# Patient Record
Sex: FEMALE | Race: AMERICAN INDIAN OR ALASKA NATIVE | Employment: PART TIME | URBAN - NONMETROPOLITAN AREA
[De-identification: names, ages, dates, MRNs, and addresses within clinical notes are randomized per-mention and may not be internally consistent; named-entity substitution may affect disease eponyms.]

---

## 2017-02-16 ENCOUNTER — TELEPHONE (OUTPATIENT)
Dept: FAMILY MEDICINE CLINIC | Facility: CLINIC | Age: 22
End: 2017-02-16

## 2017-02-16 RX ORDER — ERGOCALCIFEROL 1.25 MG/1
CAPSULE ORAL
Qty: 4 CAPSULE | Refills: 0 | Status: SHIPPED | OUTPATIENT
Start: 2017-02-16 | End: 2017-11-22 | Stop reason: ALTCHOICE

## 2017-02-16 NOTE — TELEPHONE ENCOUNTER
LOV 11/6/15. No F/U scheduled. treadalong message sent 11/2016. LM with patient instructing her to schedule a follow up. Refill pending 1 month.

## 2017-02-20 ENCOUNTER — TELEPHONE (OUTPATIENT)
Dept: FAMILY MEDICINE CLINIC | Facility: CLINIC | Age: 22
End: 2017-02-20

## 2017-03-30 ENCOUNTER — OFFICE VISIT (OUTPATIENT)
Dept: FAMILY MEDICINE CLINIC | Facility: CLINIC | Age: 22
End: 2017-03-30

## 2017-03-30 VITALS
SYSTOLIC BLOOD PRESSURE: 120 MMHG | TEMPERATURE: 98 F | WEIGHT: 231.5 LBS | DIASTOLIC BLOOD PRESSURE: 80 MMHG | BODY MASS INDEX: 42 KG/M2

## 2017-03-30 DIAGNOSIS — E28.2 PCO (POLYCYSTIC OVARIES): ICD-10-CM

## 2017-03-30 DIAGNOSIS — N91.2 AMENORRHEA: ICD-10-CM

## 2017-03-30 DIAGNOSIS — J02.9 PHARYNGITIS, UNSPECIFIED ETIOLOGY: Primary | ICD-10-CM

## 2017-03-30 LAB
CONTROL LINE PRESENT WITH A CLEAR BACKGROUND (YES/NO): YES YES/NO
STREP GRP A CUL-SCR: NEGATIVE

## 2017-03-30 PROCEDURE — 87081 CULTURE SCREEN ONLY: CPT | Performed by: INTERNAL MEDICINE

## 2017-03-30 PROCEDURE — 87880 STREP A ASSAY W/OPTIC: CPT | Performed by: INTERNAL MEDICINE

## 2017-03-30 PROCEDURE — 99214 OFFICE O/P EST MOD 30 MIN: CPT | Performed by: INTERNAL MEDICINE

## 2017-03-30 NOTE — PROGRESS NOTES
Wing Suárez is a 24year old female. HPI:   Pt has been having sore throat and cough. She has not been taking her medications, specifically metformin and lofibra. She has been gaining weight and just not motivated to care for herself.   She has not h Alcohol Use: No                 REVIEW OF SYSTEMS:   GENERAL HEALTH: feels well otherwise  SKIN: denies any unusual skin lesions or rashes  RESPIRATORY: denies shortness of breath with exertion  CARDIOVASCULAR: denies chest pain on exertion  GI: denies abd

## 2017-04-03 RX ORDER — FENOFIBRATE 67 MG/1
CAPSULE ORAL
Qty: 90 CAPSULE | Refills: 0 | Status: SHIPPED | OUTPATIENT
Start: 2017-04-03 | End: 2017-09-18

## 2017-04-07 ENCOUNTER — HOSPITAL ENCOUNTER (OUTPATIENT)
Age: 22
Discharge: HOME | End: 2017-04-07
Payer: COMMERCIAL

## 2017-04-07 ENCOUNTER — APPOINTMENT (OUTPATIENT)
Dept: GENERAL RADIOLOGY | Age: 22
End: 2017-04-07
Attending: PHYSICIAN ASSISTANT
Payer: COMMERCIAL

## 2017-04-07 VITALS
SYSTOLIC BLOOD PRESSURE: 118 MMHG | RESPIRATION RATE: 18 BRPM | OXYGEN SATURATION: 98 % | HEART RATE: 84 BPM | TEMPERATURE: 98 F | DIASTOLIC BLOOD PRESSURE: 82 MMHG
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
## 2017-04-07 DIAGNOSIS — S80.00XA CONTUSION OF KNEE, UNSPECIFIED LATERALITY, INITIAL ENCOUNTER: Primary | ICD-10-CM

## 2017-04-07 PROCEDURE — 99204 OFFICE O/P NEW MOD 45 MIN: CPT

## 2017-04-07 PROCEDURE — 99213 OFFICE O/P EST LOW 20 MIN: CPT

## 2017-04-07 PROCEDURE — 73560 X-RAY EXAM OF KNEE 1 OR 2: CPT

## 2017-04-07 RX ORDER — IBUPROFEN 600 MG/1
600 TABLET ORAL ONCE
Status: DISCONTINUED | OUTPATIENT
Start: 2017-04-07 | End: 2017-04-07

## 2017-04-07 RX ORDER — CYCLOBENZAPRINE HCL 5 MG
5 TABLET ORAL 3 TIMES DAILY PRN
Qty: 10 TABLET | Refills: 0 | Status: SHIPPED | OUTPATIENT
Start: 2017-04-07 | End: 2017-09-06
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
## 2017-04-18 ENCOUNTER — TELEPHONE (OUTPATIENT)
Dept: FAMILY MEDICINE CLINIC | Facility: CLINIC | Age: 22
End: 2017-04-18

## 2017-05-11 ENCOUNTER — LAB ENCOUNTER (OUTPATIENT)
Dept: LAB | Age: 22
End: 2017-05-11
Attending: INTERNAL MEDICINE
Payer: COMMERCIAL

## 2017-05-11 DIAGNOSIS — E28.2 PCO (POLYCYSTIC OVARIES): ICD-10-CM

## 2017-05-11 DIAGNOSIS — E66.9 OBESITY, UNSPECIFIED OBESITY SEVERITY, UNSPECIFIED OBESITY TYPE: ICD-10-CM

## 2017-05-11 DIAGNOSIS — F90.8 ATTENTION DEFICIT HYPERACTIVITY DISORDER (ADHD), OTHER TYPE: ICD-10-CM

## 2017-05-11 DIAGNOSIS — E55.9 VITAMIN D DEFICIENCY: ICD-10-CM

## 2017-05-11 PROCEDURE — 80053 COMPREHEN METABOLIC PANEL: CPT | Performed by: INTERNAL MEDICINE

## 2017-05-11 PROCEDURE — 85025 COMPLETE CBC W/AUTO DIFF WBC: CPT | Performed by: INTERNAL MEDICINE

## 2017-05-11 PROCEDURE — 82306 VITAMIN D 25 HYDROXY: CPT | Performed by: INTERNAL MEDICINE

## 2017-05-11 PROCEDURE — 83036 HEMOGLOBIN GLYCOSYLATED A1C: CPT | Performed by: INTERNAL MEDICINE

## 2017-05-11 PROCEDURE — 36415 COLL VENOUS BLD VENIPUNCTURE: CPT | Performed by: INTERNAL MEDICINE

## 2017-05-11 PROCEDURE — 80061 LIPID PANEL: CPT | Performed by: INTERNAL MEDICINE

## 2017-05-11 RX ORDER — ERGOCALCIFEROL 1.25 MG/1
CAPSULE ORAL
Qty: 4 CAPSULE | Refills: 0 | OUTPATIENT
Start: 2017-05-11

## 2017-05-11 NOTE — PROGRESS NOTES
Stephany Morton is a 24year old female. HPI:   Pt feels as if she has been taking medications more frequently. She has been working more hours at work, 28-30 instead of 8. She is still spending a lot of time in her room and eating unrestrained.        Cu exertion  CARDIOVASCULAR: denies chest pain on exertion  GI: denies abdominal pain and denies heartburn  NEURO: denies headaches    EXAM:   /80 mmHg  Temp(Src) 97.5 °F (36.4 °C) (Temporal)  Ht 62.5\"  Wt 233 lb  BMI 41.91 kg/m2  GENERAL: well develop

## 2017-05-11 NOTE — TELEPHONE ENCOUNTER
LOV 11/6/15. No F/U MyChart letter has been sent and patient has been called multiple times. 1 month refill was sent in 2/2017.

## 2017-05-18 ENCOUNTER — TELEPHONE (OUTPATIENT)
Dept: FAMILY MEDICINE CLINIC | Facility: CLINIC | Age: 22
End: 2017-05-18

## 2017-06-05 RX ORDER — METFORMIN HYDROCHLORIDE 500 MG/1
TABLET, EXTENDED RELEASE ORAL
Qty: 60 TABLET | Refills: 5 | Status: SHIPPED | OUTPATIENT
Start: 2017-06-05 | End: 2017-11-22

## 2017-06-14 ENCOUNTER — TELEPHONE (OUTPATIENT)
Dept: FAMILY MEDICINE CLINIC | Facility: CLINIC | Age: 22
End: 2017-06-14

## 2017-06-14 NOTE — TELEPHONE ENCOUNTER
Patient said that she has been sick for 2-3 days with a cough, laryngitis, congestion, and st. She said she is taking Cipro and Benzonatate left over from January last year that Dr Penelope Oconnell prescribed and she never took the full does of.  She does  Not know ho

## 2017-06-15 ENCOUNTER — OFFICE VISIT (OUTPATIENT)
Dept: FAMILY MEDICINE CLINIC | Facility: CLINIC | Age: 22
End: 2017-06-15

## 2017-06-15 VITALS
SYSTOLIC BLOOD PRESSURE: 128 MMHG | DIASTOLIC BLOOD PRESSURE: 80 MMHG | BODY MASS INDEX: 42 KG/M2 | TEMPERATURE: 99 F | WEIGHT: 231.38 LBS

## 2017-06-15 DIAGNOSIS — B34.9 VIRAL SYNDROME: Primary | ICD-10-CM

## 2017-06-15 PROCEDURE — 99213 OFFICE O/P EST LOW 20 MIN: CPT | Performed by: INTERNAL MEDICINE

## 2017-06-15 RX ORDER — CIPROFLOXACIN 500 MG/1
TABLET, FILM COATED ORAL 2 TIMES DAILY
COMMUNITY
End: 2017-09-06 | Stop reason: ALTCHOICE

## 2017-06-15 RX ORDER — BENZONATATE 200 MG/1
200 CAPSULE ORAL 3 TIMES DAILY PRN
COMMUNITY
End: 2017-09-06

## 2017-06-15 RX ORDER — PREDNISONE 20 MG/1
TABLET ORAL
Qty: 18 TABLET | Refills: 0 | Status: SHIPPED | OUTPATIENT
Start: 2017-06-15 | End: 2017-09-06

## 2017-06-15 NOTE — PROGRESS NOTES
HPI:   Elpidio Willams is a 24year old female who presents for upper respiratory symptoms for  4  days. Patient reports sore throat, congestion, dry cough.       Current Outpatient Prescriptions:  Ciprofloxacin HCl 500 MG Oral Tab Take by mouth 2 (two) time Smoking Status: Never Smoker                      Smokeless Status: Never Used                        Alcohol Use: No                  REVIEW OF SYSTEMS:   GENERAL: feels well otherwise  SKIN: no rashes  EYES:denies blurred vision or double vision

## 2017-06-16 ENCOUNTER — TELEPHONE (OUTPATIENT)
Dept: FAMILY MEDICINE CLINIC | Facility: CLINIC | Age: 22
End: 2017-06-16

## 2017-06-16 NOTE — TELEPHONE ENCOUNTER
Spoke to mom, she was wondering if pt had pneumonia. Advised mom pt was Dx with viral URI. Mom was wondering if Dr Yazmin Mendez had written a work excused for 06/17/017,per records date on work note is only for 06/13/17.    In addition mom states they are going on

## 2017-06-16 NOTE — TELEPHONE ENCOUNTER
No she is to go to work on Saturday. She is on a prednisone taper and had already been taking a CIPRO from a previous illness that she may continue. The prednisone should settle the cough and make her less contagious.

## 2017-06-17 ENCOUNTER — TELEPHONE (OUTPATIENT)
Dept: FAMILY MEDICINE CLINIC | Facility: CLINIC | Age: 22
End: 2017-06-17

## 2017-06-19 ENCOUNTER — HOSPITAL ENCOUNTER (OUTPATIENT)
Dept: GENERAL RADIOLOGY | Age: 22
Discharge: HOME | End: 2017-06-19
Attending: INTERNAL MEDICINE
Payer: COMMERCIAL

## 2017-06-19 ENCOUNTER — OFFICE VISIT (OUTPATIENT)
Dept: FAMILY MEDICINE CLINIC | Facility: CLINIC | Age: 22
End: 2017-06-19

## 2017-06-19 ENCOUNTER — TELEPHONE (OUTPATIENT)
Dept: FAMILY MEDICINE CLINIC | Facility: CLINIC | Age: 22
End: 2017-06-19

## 2017-06-19 VITALS
DIASTOLIC BLOOD PRESSURE: 68 MMHG | HEART RATE: 70 BPM | BODY MASS INDEX: 42 KG/M2 | RESPIRATION RATE: 12 BRPM | WEIGHT: 233.38 LBS | SYSTOLIC BLOOD PRESSURE: 110 MMHG | TEMPERATURE: 99 F

## 2017-06-19 DIAGNOSIS — R05.9 COUGH: Primary | ICD-10-CM

## 2017-06-19 DIAGNOSIS — R05.9 COUGH: ICD-10-CM

## 2017-06-19 PROCEDURE — 71020 XR CHEST PA + LAT CHEST (CPT=71020): CPT | Performed by: INTERNAL MEDICINE

## 2017-06-19 PROCEDURE — 99214 OFFICE O/P EST MOD 30 MIN: CPT | Performed by: INTERNAL MEDICINE

## 2017-06-19 RX ORDER — ALBUTEROL SULFATE 90 UG/1
2 AEROSOL, METERED RESPIRATORY (INHALATION) EVERY 6 HOURS PRN
Qty: 1 INHALER | Refills: 1 | Status: ON HOLD | OUTPATIENT
Start: 2017-06-19 | End: 2018-03-25 | Stop reason: ALTCHOICE

## 2017-06-19 NOTE — TELEPHONE ENCOUNTER
Mom said that patient is taking Pednisone not on any inhalers and she is still sob and coughing. She said she did not sleep last night and she did not go to work today. Mom asked if she should get a nebulizer.

## 2017-06-19 NOTE — TELEPHONE ENCOUNTER
Mom said that she is feeling worse and she thinks she does have a fever but does not have a thermometer. Advised to come in at 1pm today.  ELIJAH Magana RN

## 2017-06-19 NOTE — TELEPHONE ENCOUNTER
Does she have fever? Productive cough? Is she feeling worse, if yes she needs to return for an X ray and OV. I can order an albuterol inhaler to use as well.

## 2017-06-19 NOTE — PROGRESS NOTES
HPI:   Tez Ruiz is a 24year old female who presents for upper respiratory symptoms for  7  days. Patient reports congestion, dry cough, on a steriod and still coughing. Return to recheck and CXR. .      Current Outpatient Prescriptions:  Albuterol S Depression    • Obesity, unspecified    • Fatty liver    • PCOS (polycystic ovarian syndrome)       No past surgical history on file.    Family History   Problem Relation Age of Onset   • Adopted: Yes        Smoking Status: Never Smoker

## 2017-06-20 ENCOUNTER — TELEPHONE (OUTPATIENT)
Dept: FAMILY MEDICINE CLINIC | Facility: CLINIC | Age: 22
End: 2017-06-20

## 2017-06-20 NOTE — TELEPHONE ENCOUNTER
I have it. Your scores indicate depression and you need more aggressive treatment and likely medications and continued therapy.  Maybe it time to see a specialist like a psychiatrist to help with med management, I know this sounds like work  And it is, BUT

## 2017-06-21 ENCOUNTER — TELEPHONE (OUTPATIENT)
Dept: FAMILY MEDICINE CLINIC | Facility: CLINIC | Age: 22
End: 2017-06-21

## 2017-06-21 RX ORDER — DEXTROAMPHETAMINE SACCHARATE, AMPHETAMINE ASPARTATE MONOHYDRATE, DEXTROAMPHETAMINE SULFATE AND AMPHETAMINE SULFATE 5; 5; 5; 5 MG/1; MG/1; MG/1; MG/1
20 CAPSULE, EXTENDED RELEASE ORAL EVERY MORNING
Qty: 30 CAPSULE | Refills: 0 | Status: CANCELLED | OUTPATIENT
Start: 2017-06-21

## 2017-06-21 NOTE — TELEPHONE ENCOUNTER
Patient advised. She said she would like to try the Adderral, she will  script today. She said she has an appointment with a psychiatrist in [place], she could not remember her name.

## 2017-07-13 ENCOUNTER — TELEPHONE (OUTPATIENT)
Dept: FAMILY MEDICINE CLINIC | Facility: CLINIC | Age: 22
End: 2017-07-13

## 2017-07-13 NOTE — TELEPHONE ENCOUNTER
SHE SAW A NURSE PRACTIONER YESTERDAY FOR A PSYC EVAL AND DOES SHE NEED TO COME AND SIGN A RELEASE SO THAT THE NP CAN CALL YOU AND TALK TO YOU ABOUT THE RESULTS

## 2017-07-19 ENCOUNTER — TELEPHONE (OUTPATIENT)
Dept: FAMILY MEDICINE CLINIC | Facility: CLINIC | Age: 22
End: 2017-07-19

## 2017-07-31 ENCOUNTER — MED REC SCAN ONLY (OUTPATIENT)
Dept: FAMILY MEDICINE CLINIC | Facility: CLINIC | Age: 22
End: 2017-07-31

## 2017-08-07 ENCOUNTER — TELEPHONE (OUTPATIENT)
Dept: FAMILY MEDICINE CLINIC | Facility: CLINIC | Age: 22
End: 2017-08-07

## 2017-08-24 ENCOUNTER — TELEPHONE (OUTPATIENT)
Dept: FAMILY MEDICINE CLINIC | Facility: CLINIC | Age: 22
End: 2017-08-24

## 2017-08-24 RX ORDER — ESCITALOPRAM OXALATE 10 MG/1
10 TABLET ORAL DAILY
Qty: 30 TABLET | Refills: 0 | COMMUNITY
Start: 2017-08-24 | End: 2018-03-30

## 2017-08-24 NOTE — TELEPHONE ENCOUNTER
XENIA      SHE WILL PICK IT UP TOMORROW AFTERNOON       ALSO SHE WANTS DR BREWER TO KNOW THAT SHE STARTED TAKING ESCITALOPRAM YESTERDAY

## 2017-09-01 ENCOUNTER — TELEPHONE (OUTPATIENT)
Dept: FAMILY MEDICINE CLINIC | Facility: CLINIC | Age: 22
End: 2017-09-01

## 2017-09-05 NOTE — TELEPHONE ENCOUNTER
LM for Mom that she should check at the end of the week, will take at least 30 days, she should call back at the end of the week.

## 2017-09-06 ENCOUNTER — OFFICE VISIT (OUTPATIENT)
Dept: FAMILY MEDICINE CLINIC | Facility: CLINIC | Age: 22
End: 2017-09-06

## 2017-09-06 VITALS
HEART RATE: 80 BPM | DIASTOLIC BLOOD PRESSURE: 70 MMHG | BODY MASS INDEX: 42.79 KG/M2 | HEIGHT: 62.5 IN | TEMPERATURE: 98 F | WEIGHT: 238.5 LBS | RESPIRATION RATE: 16 BRPM | SYSTOLIC BLOOD PRESSURE: 110 MMHG

## 2017-09-06 DIAGNOSIS — L90.0 LICHEN SCLEROSUS: ICD-10-CM

## 2017-09-06 DIAGNOSIS — K60.2 ANAL FISSURE: ICD-10-CM

## 2017-09-06 DIAGNOSIS — K62.5 RECTAL BLEEDING: Primary | ICD-10-CM

## 2017-09-06 PROCEDURE — 99213 OFFICE O/P EST LOW 20 MIN: CPT | Performed by: INTERNAL MEDICINE

## 2017-09-06 RX ORDER — ESCITALOPRAM OXALATE 10 MG/1
10 TABLET ORAL DAILY
Refills: 0 | COMMUNITY
Start: 2017-09-06 | End: 2017-09-06

## 2017-09-06 RX ORDER — CLOBETASOL PROPIONATE 0.5 MG/G
1 OINTMENT TOPICAL 2 TIMES DAILY
Qty: 60 G | Refills: 0 | Status: ON HOLD | OUTPATIENT
Start: 2017-09-06 | End: 2018-03-25 | Stop reason: ALTCHOICE

## 2017-09-06 RX ORDER — POLYETHYLENE GLYCOL 3350 17 G/17G
17 POWDER, FOR SOLUTION ORAL DAILY
Qty: 500 G | Refills: 0 | Status: ON HOLD | OUTPATIENT
Start: 2017-09-06 | End: 2018-03-25

## 2017-09-18 RX ORDER — FENOFIBRATE 67 MG/1
CAPSULE ORAL
Qty: 90 CAPSULE | Refills: 3 | Status: SHIPPED | OUTPATIENT
Start: 2017-09-18 | End: 2017-11-22

## 2017-09-22 ENCOUNTER — TELEPHONE (OUTPATIENT)
Dept: FAMILY MEDICINE CLINIC | Facility: CLINIC | Age: 22
End: 2017-09-22

## 2017-09-22 NOTE — TELEPHONE ENCOUNTER
Records req. From [facility] they received notice that no records available for that time period. Pt. Has records in [employer]. Called and spoke to [employer] and per [employer] she spoke with a supervisor and they will reprocess this request today.

## 2017-09-27 ENCOUNTER — TELEPHONE (OUTPATIENT)
Dept: FAMILY MEDICINE CLINIC | Facility: CLINIC | Age: 22
End: 2017-09-27

## 2017-09-27 RX ORDER — DEXTROAMPHETAMINE SACCHARATE, AMPHETAMINE ASPARTATE MONOHYDRATE, DEXTROAMPHETAMINE SULFATE AND AMPHETAMINE SULFATE 5; 5; 5; 5 MG/1; MG/1; MG/1; MG/1
20 CAPSULE, EXTENDED RELEASE ORAL DAILY
Qty: 30 CAPSULE | Refills: 0 | Status: SHIPPED | OUTPATIENT
Start: 2017-10-27 | End: 2017-11-26

## 2017-09-27 RX ORDER — DEXTROAMPHETAMINE SACCHARATE, AMPHETAMINE ASPARTATE MONOHYDRATE, DEXTROAMPHETAMINE SULFATE AND AMPHETAMINE SULFATE 5; 5; 5; 5 MG/1; MG/1; MG/1; MG/1
20 CAPSULE, EXTENDED RELEASE ORAL DAILY
Qty: 30 CAPSULE | Refills: 0 | Status: SHIPPED | OUTPATIENT
Start: 2017-11-26 | End: 2017-11-22

## 2017-09-27 RX ORDER — DEXTROAMPHETAMINE SACCHARATE, AMPHETAMINE ASPARTATE MONOHYDRATE, DEXTROAMPHETAMINE SULFATE AND AMPHETAMINE SULFATE 5; 5; 5; 5 MG/1; MG/1; MG/1; MG/1
20 CAPSULE, EXTENDED RELEASE ORAL DAILY
Qty: 30 CAPSULE | Refills: 0 | Status: SHIPPED | OUTPATIENT
Start: 2017-09-27 | End: 2017-10-27

## 2017-09-27 NOTE — TELEPHONE ENCOUNTER
Lisdexamfetamine Dimesylate (VYVANSE) 20 MG    They would like to know if they can get a generic due to cost? Please let mom know and she will .

## 2017-11-22 ENCOUNTER — OFFICE VISIT (OUTPATIENT)
Dept: FAMILY MEDICINE CLINIC | Facility: CLINIC | Age: 22
End: 2017-11-22

## 2017-11-22 ENCOUNTER — APPOINTMENT (OUTPATIENT)
Dept: LAB | Age: 22
End: 2017-11-22
Attending: INTERNAL MEDICINE
Payer: COMMERCIAL

## 2017-11-22 VITALS
SYSTOLIC BLOOD PRESSURE: 118 MMHG | RESPIRATION RATE: 16 BRPM | DIASTOLIC BLOOD PRESSURE: 70 MMHG | HEIGHT: 62 IN | HEART RATE: 76 BPM | WEIGHT: 235 LBS | TEMPERATURE: 98 F | BODY MASS INDEX: 43.24 KG/M2

## 2017-11-22 DIAGNOSIS — E55.9 VITAMIN D DEFICIENCY: ICD-10-CM

## 2017-11-22 DIAGNOSIS — K76.0 FATTY LIVER: ICD-10-CM

## 2017-11-22 DIAGNOSIS — B37.3 YEAST VAGINITIS: ICD-10-CM

## 2017-11-22 DIAGNOSIS — E28.2 PCO (POLYCYSTIC OVARIES): ICD-10-CM

## 2017-11-22 DIAGNOSIS — Z00.00 WELL ADULT EXAM: Primary | ICD-10-CM

## 2017-11-22 PROCEDURE — 88175 CYTOPATH C/V AUTO FLUID REDO: CPT | Performed by: INTERNAL MEDICINE

## 2017-11-22 PROCEDURE — 36415 COLL VENOUS BLD VENIPUNCTURE: CPT | Performed by: INTERNAL MEDICINE

## 2017-11-22 PROCEDURE — 80053 COMPREHEN METABOLIC PANEL: CPT | Performed by: INTERNAL MEDICINE

## 2017-11-22 PROCEDURE — 83036 HEMOGLOBIN GLYCOSYLATED A1C: CPT | Performed by: INTERNAL MEDICINE

## 2017-11-22 PROCEDURE — 99395 PREV VISIT EST AGE 18-39: CPT | Performed by: INTERNAL MEDICINE

## 2017-11-22 PROCEDURE — 82306 VITAMIN D 25 HYDROXY: CPT | Performed by: INTERNAL MEDICINE

## 2017-11-22 RX ORDER — DEXTROAMPHETAMINE SACCHARATE, AMPHETAMINE ASPARTATE MONOHYDRATE, DEXTROAMPHETAMINE SULFATE AND AMPHETAMINE SULFATE 7.5; 7.5; 7.5; 7.5 MG/1; MG/1; MG/1; MG/1
30 CAPSULE, EXTENDED RELEASE ORAL DAILY
Qty: 30 CAPSULE | Refills: 0 | Status: SHIPPED | OUTPATIENT
Start: 2017-12-22 | End: 2018-01-21

## 2017-11-22 RX ORDER — FENOFIBRATE 67 MG/1
CAPSULE ORAL
Qty: 90 CAPSULE | Refills: 3 | Status: SHIPPED | OUTPATIENT
Start: 2017-11-22 | End: 2018-04-25

## 2017-11-22 RX ORDER — DEXTROAMPHETAMINE SACCHARATE, AMPHETAMINE ASPARTATE MONOHYDRATE, DEXTROAMPHETAMINE SULFATE AND AMPHETAMINE SULFATE 7.5; 7.5; 7.5; 7.5 MG/1; MG/1; MG/1; MG/1
30 CAPSULE, EXTENDED RELEASE ORAL DAILY
Qty: 30 CAPSULE | Refills: 0 | Status: SHIPPED | OUTPATIENT
Start: 2017-12-22 | End: 2017-11-22

## 2017-11-22 RX ORDER — METFORMIN HYDROCHLORIDE 500 MG/1
1000 TABLET, EXTENDED RELEASE ORAL
Qty: 180 TABLET | Refills: 3 | Status: SHIPPED | OUTPATIENT
Start: 2017-11-22 | End: 2019-03-21

## 2017-11-22 RX ORDER — DEXTROAMPHETAMINE SACCHARATE, AMPHETAMINE ASPARTATE MONOHYDRATE, DEXTROAMPHETAMINE SULFATE AND AMPHETAMINE SULFATE 7.5; 7.5; 7.5; 7.5 MG/1; MG/1; MG/1; MG/1
30 CAPSULE, EXTENDED RELEASE ORAL DAILY
Qty: 30 CAPSULE | Refills: 0 | Status: SHIPPED | OUTPATIENT
Start: 2018-01-21 | End: 2017-11-22

## 2017-11-22 RX ORDER — FLUCONAZOLE 150 MG/1
150 TABLET ORAL ONCE
Qty: 1 TABLET | Refills: 0 | Status: SHIPPED | OUTPATIENT
Start: 2017-11-22 | End: 2017-11-22

## 2017-11-22 RX ORDER — DEXTROAMPHETAMINE SACCHARATE, AMPHETAMINE ASPARTATE MONOHYDRATE, DEXTROAMPHETAMINE SULFATE AND AMPHETAMINE SULFATE 7.5; 7.5; 7.5; 7.5 MG/1; MG/1; MG/1; MG/1
30 CAPSULE, EXTENDED RELEASE ORAL DAILY
Qty: 30 CAPSULE | Refills: 0 | Status: SHIPPED | OUTPATIENT
Start: 2017-11-22 | End: 2017-12-22

## 2017-11-22 RX ORDER — DEXTROAMPHETAMINE SACCHARATE, AMPHETAMINE ASPARTATE MONOHYDRATE, DEXTROAMPHETAMINE SULFATE AND AMPHETAMINE SULFATE 7.5; 7.5; 7.5; 7.5 MG/1; MG/1; MG/1; MG/1
30 CAPSULE, EXTENDED RELEASE ORAL DAILY
Qty: 30 CAPSULE | Refills: 0 | Status: SHIPPED | OUTPATIENT
Start: 2018-01-21 | End: 2018-02-20

## 2017-11-22 RX ORDER — DEXTROAMPHETAMINE SACCHARATE, AMPHETAMINE ASPARTATE MONOHYDRATE, DEXTROAMPHETAMINE SULFATE AND AMPHETAMINE SULFATE 7.5; 7.5; 7.5; 7.5 MG/1; MG/1; MG/1; MG/1
30 CAPSULE, EXTENDED RELEASE ORAL DAILY
Qty: 30 CAPSULE | Refills: 0 | Status: SHIPPED | OUTPATIENT
Start: 2017-11-22 | End: 2017-11-22

## 2017-11-22 NOTE — PROGRESS NOTES
HPI:   Jaden Chavira is a 25year old female who presents for a complete physical exam. Symptoms: denies discharge, itching, burning or dysuria, periods are irregular.  Patient complains of ineffective ADD medications, she is taking them everyday, she is w Value Date   HDL 39 (L) 05/11/2017   HDL 44 (L) 11/09/2016   HDL 35 (L) 05/13/2015       Lab Results  Component Value Date   LDL 92 05/11/2017   LDL 92 11/09/2016    (H) 05/13/2015       Lab Results  Component Value Date   AST 57 (H) 05/11/2017   AS the lungs every 6 (six) hours as needed for Wheezing.  Disp: 1 Inhaler Rfl: 1      Past Medical History:   Diagnosis Date   • ADD (attention deficit disorder with hyperactivity)    • Depression    • Fatty liver    • Obesity, unspecified    • PCOS (polycysti masses or tenderness, PAP was done   RECTAL:done  MUSCULOSKELETAL: back is not tender,FROM of the back  EXTREMITIES: no cyanosis, clubbing or edema  NEURO: Oriented times three,cranial nerves are intact,motor and sensory are grossly intact    ASSESSMENT AN

## 2017-11-24 ENCOUNTER — TELEPHONE (OUTPATIENT)
Dept: FAMILY MEDICINE CLINIC | Facility: CLINIC | Age: 22
End: 2017-11-24

## 2017-11-24 NOTE — TELEPHONE ENCOUNTER
Patient's mom is calling and is wanting to know when patinet should have mammogram? Mom advised that normally mammograms are started at age 36, but I would confirm with Dr. Stacey Cesar.      Also mom is wanting to know when patient is due to start medication to h

## 2017-11-28 ENCOUNTER — TELEPHONE (OUTPATIENT)
Dept: FAMILY MEDICINE CLINIC | Facility: CLINIC | Age: 22
End: 2017-11-28

## 2017-11-28 RX ORDER — ERGOCALCIFEROL 1.25 MG/1
50000 CAPSULE ORAL
Qty: 24 CAPSULE | Refills: 0 | Status: SHIPPED | OUTPATIENT
Start: 2017-11-30 | End: 2018-01-29

## 2017-11-28 NOTE — TELEPHONE ENCOUNTER
----- Message from Dionicio Villalobos MD sent at 11/25/2017  9:13 AM CST -----  D is low, liver function is BETTER! No overt diabetes, these might need to go to another provider. If not  She needs 50,000 IU VIT D 2 x a week for the next 3 months then recheck.

## 2017-11-28 NOTE — TELEPHONE ENCOUNTER
Mom advised. She said she no longer sees the provider for the POS.  Script sent to [facility] in [place]

## 2018-01-29 ENCOUNTER — TELEPHONE (OUTPATIENT)
Dept: FAMILY MEDICINE CLINIC | Facility: CLINIC | Age: 23
End: 2018-01-29

## 2018-01-29 RX ORDER — ERGOCALCIFEROL 1.25 MG/1
50000 CAPSULE ORAL
Qty: 8 CAPSULE | Refills: 0 | Status: SHIPPED | OUTPATIENT
Start: 2018-01-29 | End: 2018-02-28

## 2018-02-26 RX ORDER — ERGOCALCIFEROL 1.25 MG/1
CAPSULE ORAL
Qty: 24 CAPSULE | Refills: 0 | Status: SHIPPED | OUTPATIENT
Start: 2018-02-26 | End: 2018-04-17

## 2018-02-26 NOTE — TELEPHONE ENCOUNTER
Last office visit: 11/22/2017  Last Vitamin D taken 11/22/2017:  15.9    Last refill: 1/29/2018    Pending Prescriptions Disp Refills    ERGOCALCIFEROL 21048 units Oral Cap [Pharmacy Med Name: VITAMIN D2 50,000IU (ERGO) CAP RX] 24 capsule 0     Sig: TAKE 1

## 2018-03-25 PROBLEM — F41.1 ANXIETY STATE: Status: ACTIVE | Noted: 2018-03-25

## 2018-03-25 PROBLEM — F33.9 MAJOR DEPRESSION, RECURRENT (HCC): Status: ACTIVE | Noted: 2018-03-25

## 2018-03-25 PROBLEM — F33.9 MAJOR DEPRESSION, RECURRENT: Status: ACTIVE | Noted: 2018-03-25

## 2018-03-31 ENCOUNTER — TELEPHONE (OUTPATIENT)
Dept: FAMILY MEDICINE CLINIC | Facility: CLINIC | Age: 23
End: 2018-03-31

## 2018-03-31 RX ORDER — SUMATRIPTAN 100 MG/1
100 TABLET, FILM COATED ORAL EVERY 2 HOUR PRN
Qty: 9 TABLET | Refills: 1 | Status: SHIPPED | OUTPATIENT
Start: 2018-03-31 | End: 2021-06-18

## 2018-03-31 RX ORDER — SUMATRIPTAN 100 MG/1
TABLET, FILM COATED ORAL
Qty: 9 TABLET | Refills: 0 | Status: SHIPPED | OUTPATIENT
Start: 2018-03-31 | End: 2019-04-01

## 2018-03-31 NOTE — TELEPHONE ENCOUNTER
Last rf 6/24/16 #9x1  Last ov 11/22/17    Future Appointments  Date Time Provider Ervin Steiner   4/2/2018 8:30 AM ADULT ANXIETY PHP [facility] [place]   4/3/2018 8:30 AM ADULT ANXIETY PHP [facility]   4/4/2018 8:30 AM ADULT ANXIETY PHP [facility] Nela Bullard

## 2018-04-02 NOTE — TELEPHONE ENCOUNTER
Mom advised.  She said that patient was at [facility] last week and now she is in the outpatient program. Dr Lauren Costa is aware, appointment  Scheduled March 12th at 515pm.

## 2018-04-09 ENCOUNTER — TELEPHONE (OUTPATIENT)
Dept: FAMILY MEDICINE CLINIC | Facility: CLINIC | Age: 23
End: 2018-04-09

## 2018-04-17 ENCOUNTER — OFFICE VISIT (OUTPATIENT)
Dept: FAMILY MEDICINE CLINIC | Facility: CLINIC | Age: 23
End: 2018-04-17

## 2018-04-17 VITALS
WEIGHT: 243.25 LBS | HEIGHT: 62 IN | SYSTOLIC BLOOD PRESSURE: 90 MMHG | DIASTOLIC BLOOD PRESSURE: 70 MMHG | OXYGEN SATURATION: 99 % | BODY MASS INDEX: 44.76 KG/M2 | TEMPERATURE: 97 F | HEART RATE: 72 BPM

## 2018-04-17 DIAGNOSIS — F32.3 SEVERE MAJOR DEPRESSION WITH PSYCHOTIC FEATURES (HCC): ICD-10-CM

## 2018-04-17 DIAGNOSIS — E66.01 CLASS 2 SEVERE OBESITY DUE TO EXCESS CALORIES WITH SERIOUS COMORBIDITY AND BODY MASS INDEX (BMI) OF 36.0 TO 36.9 IN ADULT (HCC): ICD-10-CM

## 2018-04-17 DIAGNOSIS — E28.2 POLYCYSTIC OVARIES: ICD-10-CM

## 2018-04-17 DIAGNOSIS — E55.9 VITAMIN D DEFICIENCY: Primary | ICD-10-CM

## 2018-04-17 PROCEDURE — 99214 OFFICE O/P EST MOD 30 MIN: CPT | Performed by: INTERNAL MEDICINE

## 2018-04-17 RX ORDER — ERGOCALCIFEROL 1.25 MG/1
50000 CAPSULE ORAL
Qty: 24 CAPSULE | Refills: 0 | Status: SHIPPED | OUTPATIENT
Start: 2018-04-19 | End: 2018-04-28
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
## 2018-04-25 ENCOUNTER — TELEPHONE (OUTPATIENT)
Dept: FAMILY MEDICINE CLINIC | Facility: CLINIC | Age: 23
End: 2018-04-25

## 2018-04-25 RX ORDER — FENOFIBRATE 67 MG/1
CAPSULE ORAL
Qty: 90 CAPSULE | Refills: 0 | Status: SHIPPED | OUTPATIENT
Start: 2018-04-25 | End: 2019-08-21

## 2018-04-26 ENCOUNTER — APPOINTMENT (OUTPATIENT)
Dept: LAB | Age: 23
End: 2018-04-26
Attending: INTERNAL MEDICINE
Payer: COMMERCIAL

## 2018-04-26 ENCOUNTER — TELEPHONE (OUTPATIENT)
Dept: FAMILY MEDICINE CLINIC | Facility: CLINIC | Age: 23
End: 2018-04-26

## 2018-04-26 DIAGNOSIS — F32.3 SEVERE MAJOR DEPRESSION WITH PSYCHOTIC FEATURES (HCC): ICD-10-CM

## 2018-04-26 DIAGNOSIS — E55.9 VITAMIN D DEFICIENCY: ICD-10-CM

## 2018-04-26 PROCEDURE — 80061 LIPID PANEL: CPT | Performed by: INTERNAL MEDICINE

## 2018-04-26 PROCEDURE — 36415 COLL VENOUS BLD VENIPUNCTURE: CPT | Performed by: INTERNAL MEDICINE

## 2018-04-26 PROCEDURE — 80053 COMPREHEN METABOLIC PANEL: CPT | Performed by: INTERNAL MEDICINE

## 2018-04-26 PROCEDURE — 82306 VITAMIN D 25 HYDROXY: CPT | Performed by: INTERNAL MEDICINE

## 2018-04-26 NOTE — TELEPHONE ENCOUNTER
MOM LOOKING FOR THE PHYSICAL FORM/INFORMATION FOR HER TO TAKE WITH HER WHEN PATIENT LEAVES Sunday. MOM SAID LIKE A SCHOOL PHYSICAL FORM.

## 2018-04-27 ENCOUNTER — TELEPHONE (OUTPATIENT)
Dept: FAMILY MEDICINE CLINIC | Facility: CLINIC | Age: 23
End: 2018-04-27

## 2018-04-28 RX ORDER — ERGOCALCIFEROL 1.25 MG/1
50000 CAPSULE ORAL
Qty: 24 CAPSULE | Refills: 0 | Status: SHIPPED | OUTPATIENT
Start: 2018-04-30 | End: 2018-07-29

## 2018-04-28 NOTE — TELEPHONE ENCOUNTER
Mom asked if patient should be taking vitamin D supplements still. If so they need a script sent to the pharmacy, she is leaving tomorrow.

## 2018-05-07 ENCOUNTER — MED REC SCAN ONLY (OUTPATIENT)
Dept: FAMILY MEDICINE CLINIC | Facility: CLINIC | Age: 23
End: 2018-05-07

## 2018-08-04 ENCOUNTER — TELEPHONE (OUTPATIENT)
Dept: FAMILY MEDICINE CLINIC | Facility: CLINIC | Age: 23
End: 2018-08-04

## 2018-08-04 NOTE — TELEPHONE ENCOUNTER
Elissa notified of instructions. Would like Dr. Adrian Magdaleno to know that patient is now taking Vitamin D once weekly instead of twice due to the summer. Would like a call if Dr. Adrian Magdaleno has any changes to this.

## 2018-08-04 NOTE — TELEPHONE ENCOUNTER
I reviewed the information provided by the [facility] Tovar 1. I think she should take the progesterone 2 months from 7/19 unless she starts having more spontaneous periods, decreasing the interval will decreased FLOW and cramping.  For cramps use mortin 800 mg 3

## 2018-08-08 ENCOUNTER — MED REC SCAN ONLY (OUTPATIENT)
Dept: FAMILY MEDICINE CLINIC | Facility: CLINIC | Age: 23
End: 2018-08-08

## 2018-10-05 ENCOUNTER — TELEPHONE (OUTPATIENT)
Dept: FAMILY MEDICINE CLINIC | Facility: CLINIC | Age: 23
End: 2018-10-05

## 2018-10-05 NOTE — TELEPHONE ENCOUNTER
VITAMIN D SHE IS OUT OF THIS. SHOULD SHE CONTINUE ON SCRIPT? SHE IS AT SCHOOL OUT OF STATE.   PLEASE CALL MOM  BEFORE SENDING ANYTHING TO A PHARMACY

## 2018-10-05 NOTE — TELEPHONE ENCOUNTER
Last Vitamin D taken 4/26/2018: 45.1        Sarah Lei          10/5/18 3:15 PM   Note        VITAMIN D SHE IS OUT OF THIS. SHOULD SHE CONTINUE ON SCRIPT? SHE IS AT SCHOOL OUT OF STATE.   PLEASE CALL MOM  BEFORE SENDING ANYTHING TO A PHARMACY

## 2018-10-06 RX ORDER — ERGOCALCIFEROL 1.25 MG/1
50000 CAPSULE ORAL
Qty: 24 CAPSULE | Refills: 0 | Status: SHIPPED | OUTPATIENT
Start: 2018-10-08 | End: 2019-01-06

## 2018-10-06 NOTE — TELEPHONE ENCOUNTER
Patient should continue vitamin D as prescribed and check vitamin D level when she is home on break.   Please refill meds as needed

## 2018-10-06 NOTE — TELEPHONE ENCOUNTER
Spoke with mother and advised of recommendation.     Mom is going to call back with name to pharmacy at pt school to send refill

## 2018-10-08 ENCOUNTER — TELEPHONE (OUTPATIENT)
Dept: FAMILY MEDICINE CLINIC | Facility: CLINIC | Age: 23
End: 2018-10-08

## 2018-10-08 DIAGNOSIS — K76.9 CHRONIC NONALCOHOLIC LIVER DISEASE: ICD-10-CM

## 2018-10-08 DIAGNOSIS — E78.2 ELEVATED TRIGLYCERIDES WITH HIGH CHOLESTEROL: ICD-10-CM

## 2018-10-08 DIAGNOSIS — E78.00 ELEVATED CHOLESTEROL: ICD-10-CM

## 2018-10-08 DIAGNOSIS — E55.9 VITAMIN D DEFICIENCY: ICD-10-CM

## 2018-10-08 DIAGNOSIS — R79.89 ABNORMAL LIVER FUNCTION TESTS: Primary | ICD-10-CM

## 2018-10-08 NOTE — TELEPHONE ENCOUNTER
Patient's mother is requesting that the order for labs be mailed or emailed to the health coordinator at her school. It is as follows    Maggie Johnson  [address] [facility], [address]    email is   [email or web address]

## 2018-10-08 NOTE — TELEPHONE ENCOUNTER
Mom said that she does not think that Enedina Nichols will be back before [place], she would like lab orders sent to school. Orders for CMP,Lipid, and Vitamin D emailed.

## 2018-10-23 ENCOUNTER — TELEPHONE (OUTPATIENT)
Dept: FAMILY MEDICINE CLINIC | Facility: CLINIC | Age: 23
End: 2018-10-23

## 2018-10-23 NOTE — TELEPHONE ENCOUNTER
PATIENT IS AWAY AT COLLEGE AND MOTHER CALLING IN ABOUT PROGESTERON FOR OVER A MONTH AND NO CYCLE YET.  ALSO, CAN SHE DO LABS AT SAME TIME AS CPX?  AFTER 11/22

## 2018-10-23 NOTE — TELEPHONE ENCOUNTER
I see she has PCOS. She may not get a period every time and [facility] can continue the every 2 months cycling. The other option is to put [place] on birth control for her PCOS and see if she gets a period that way. I would start with microgestin 20 mcg daily.

## 2018-10-23 NOTE — TELEPHONE ENCOUNTER
Mom said that Dr Roxie Stout put patient on Progesterone on September 19th, and she has not gotten a period. She had been taking it to get her period every 4 months and then Dr Roxie Stout switched it so she would get her period every 2 months.

## 2018-10-25 NOTE — TELEPHONE ENCOUNTER
Per Dr Garfield Barnett, patient may only get a period 3-4 times per year. If she has not gotten a period after 10 days of being on the Progesterone then she can stop it and wait 2 months then take it again. She may also see a gynecologist for further advised.

## 2018-10-25 NOTE — TELEPHONE ENCOUNTER
Mom advised, she said she will have her do that but she will discuss with Dr Erasmo Currie when she gets back.

## 2018-11-17 ENCOUNTER — TELEPHONE (OUTPATIENT)
Dept: FAMILY MEDICINE CLINIC | Facility: CLINIC | Age: 23
End: 2018-11-17

## 2018-11-17 NOTE — TELEPHONE ENCOUNTER
Fax order for lab work to health  [facility] attn: Fela Fish, ph [phone], email anival [email or web address].

## 2018-11-26 ENCOUNTER — TELEPHONE (OUTPATIENT)
Dept: FAMILY MEDICINE CLINIC | Facility: CLINIC | Age: 23
End: 2018-11-26

## 2018-12-12 ENCOUNTER — MED REC SCAN ONLY (OUTPATIENT)
Dept: FAMILY MEDICINE CLINIC | Facility: CLINIC | Age: 23
End: 2018-12-12

## 2018-12-26 ENCOUNTER — OFFICE VISIT (OUTPATIENT)
Dept: FAMILY MEDICINE CLINIC | Facility: CLINIC | Age: 23
End: 2018-12-26
Payer: COMMERCIAL

## 2018-12-26 ENCOUNTER — LAB ENCOUNTER (OUTPATIENT)
Dept: LAB | Age: 23
End: 2018-12-26
Attending: INTERNAL MEDICINE
Payer: COMMERCIAL

## 2018-12-26 VITALS
WEIGHT: 248.5 LBS | OXYGEN SATURATION: 100 % | TEMPERATURE: 98 F | DIASTOLIC BLOOD PRESSURE: 80 MMHG | BODY MASS INDEX: 44.59 KG/M2 | HEIGHT: 62.5 IN | HEART RATE: 78 BPM | SYSTOLIC BLOOD PRESSURE: 118 MMHG

## 2018-12-26 DIAGNOSIS — K76.9 CHRONIC NONALCOHOLIC LIVER DISEASE: ICD-10-CM

## 2018-12-26 DIAGNOSIS — E55.9 VITAMIN D DEFICIENCY: ICD-10-CM

## 2018-12-26 DIAGNOSIS — K76.0 FATTY LIVER: ICD-10-CM

## 2018-12-26 DIAGNOSIS — F33.1 MAJOR DEPRESSIVE DISORDER, RECURRENT EPISODE, MODERATE (HCC): ICD-10-CM

## 2018-12-26 DIAGNOSIS — E78.2 ELEVATED TRIGLYCERIDES WITH HIGH CHOLESTEROL: ICD-10-CM

## 2018-12-26 DIAGNOSIS — Z00.00 WELL ADULT EXAM: Primary | ICD-10-CM

## 2018-12-26 DIAGNOSIS — R79.89 ABNORMAL LIVER FUNCTION TESTS: ICD-10-CM

## 2018-12-26 DIAGNOSIS — E66.01 CLASS 2 SEVERE OBESITY DUE TO EXCESS CALORIES WITH SERIOUS COMORBIDITY AND BODY MASS INDEX (BMI) OF 36.0 TO 36.9 IN ADULT (HCC): ICD-10-CM

## 2018-12-26 PROCEDURE — 80050 GENERAL HEALTH PANEL: CPT | Performed by: INTERNAL MEDICINE

## 2018-12-26 PROCEDURE — 83036 HEMOGLOBIN GLYCOSYLATED A1C: CPT | Performed by: INTERNAL MEDICINE

## 2018-12-26 PROCEDURE — 82306 VITAMIN D 25 HYDROXY: CPT | Performed by: INTERNAL MEDICINE

## 2018-12-26 PROCEDURE — 80061 LIPID PANEL: CPT | Performed by: INTERNAL MEDICINE

## 2018-12-26 PROCEDURE — 36415 COLL VENOUS BLD VENIPUNCTURE: CPT | Performed by: INTERNAL MEDICINE

## 2018-12-26 PROCEDURE — 99395 PREV VISIT EST AGE 18-39: CPT | Performed by: INTERNAL MEDICINE

## 2018-12-26 PROCEDURE — 88175 CYTOPATH C/V AUTO FLUID REDO: CPT | Performed by: INTERNAL MEDICINE

## 2018-12-26 RX ORDER — DROSPIRENONE AND ETHINYL ESTRADIOL 0.03MG-3MG
1 KIT ORAL DAILY
Qty: 3 PACKAGE | Refills: 3 | Status: SHIPPED | OUTPATIENT
Start: 2018-12-26 | End: 2019-03-05

## 2018-12-26 RX ORDER — DEXTROAMPHETAMINE SACCHARATE, AMPHETAMINE ASPARTATE MONOHYDRATE, DEXTROAMPHETAMINE SULFATE AND AMPHETAMINE SULFATE 7.5; 7.5; 7.5; 7.5 MG/1; MG/1; MG/1; MG/1
30 CAPSULE, EXTENDED RELEASE ORAL DAILY
Qty: 30 CAPSULE | Refills: 0 | Status: SHIPPED | OUTPATIENT
Start: 2018-12-26 | End: 2019-01-25

## 2018-12-26 RX ORDER — DEXTROAMPHETAMINE SACCHARATE, AMPHETAMINE ASPARTATE MONOHYDRATE, DEXTROAMPHETAMINE SULFATE AND AMPHETAMINE SULFATE 7.5; 7.5; 7.5; 7.5 MG/1; MG/1; MG/1; MG/1
30 CAPSULE, EXTENDED RELEASE ORAL DAILY
Qty: 30 CAPSULE | Refills: 0 | Status: SHIPPED | OUTPATIENT
Start: 2019-02-24 | End: 2019-04-02

## 2018-12-26 RX ORDER — BUPROPION HYDROCHLORIDE 150 MG/1
150 TABLET ORAL EVERY MORNING
Refills: 0 | COMMUNITY
Start: 2018-12-23 | End: 2019-12-18

## 2018-12-26 RX ORDER — DEXTROAMPHETAMINE SACCHARATE, AMPHETAMINE ASPARTATE MONOHYDRATE, DEXTROAMPHETAMINE SULFATE AND AMPHETAMINE SULFATE 7.5; 7.5; 7.5; 7.5 MG/1; MG/1; MG/1; MG/1
30 CAPSULE, EXTENDED RELEASE ORAL DAILY
Qty: 30 CAPSULE | Refills: 0 | Status: SHIPPED | OUTPATIENT
Start: 2019-01-25 | End: 2019-02-24

## 2018-12-26 RX ORDER — CLOBETASOL PROPIONATE 0.5 MG/G
1 CREAM TOPICAL NIGHTLY
Qty: 60 G | Refills: 0 | Status: SHIPPED | OUTPATIENT
Start: 2018-12-26 | End: 2019-01-25

## 2018-12-26 NOTE — PROGRESS NOTES
HPI:   Tez Ruiz is a 21year old female who presents for a complete physical exam. Symptoms: denies discharge, itching, burning or dysuria, PCOS and weight gain cause irregularity of periods. . Patient complains of nothing.   She needs to restart OCP, 03/25/2018    ALT 60 (H) 11/22/2017         Current Outpatient Medications:  BuPROPion HCl ER, XL, 150 MG Oral Tablet 24 Hr Take 150 mg by mouth every morning.  Disp:  Rfl: 0   ergocalciferol 44422 units Oral Cap Take 1 capsule (50,000 Units total) by mouth any unusual skin lesions  EYES:denies blurred vision or double vision  HEENT: denies nasal congestion, sinus pain or ST  LUNGS: denies shortness of breath with exertion  CARDIOVASCULAR: denies chest pain on exertion  GI: denies abdominal pain,denies heartb kg/m²., recommended low fat diet and aerobic exercise 30 minutes three times weekly. The patient indicates understanding of these issues and agrees to the plan. The patient is asked to return for CPX in 1 year.

## 2019-01-24 RX ORDER — FENOFIBRATE 67 MG/1
CAPSULE ORAL
Qty: 90 CAPSULE | Refills: 0 | Status: SHIPPED | OUTPATIENT
Start: 2019-01-24 | End: 2019-04-01

## 2019-01-28 ENCOUNTER — TELEPHONE (OUTPATIENT)
Dept: FAMILY MEDICINE CLINIC | Facility: CLINIC | Age: 24
End: 2019-01-28

## 2019-01-28 NOTE — TELEPHONE ENCOUNTER
Mom said patient is still in [place]. She said she started her BC pill to regulate her period. She asked if she does not get her period on the days she takes the placebos if she should still take them and then should she restart the pill as scheduled?

## 2019-02-13 ENCOUNTER — TELEPHONE (OUTPATIENT)
Dept: FAMILY MEDICINE CLINIC | Facility: CLINIC | Age: 24
End: 2019-02-13

## 2019-02-13 NOTE — TELEPHONE ENCOUNTER
Mom advised, she said that patient cannot eat and she lies in bed all day Mom said that she is paying $300 a day for her to be where she is so she is ok with her periods being irregular for now.  She said that she is coming home on March 1st because she has

## 2019-03-05 ENCOUNTER — OFFICE VISIT (OUTPATIENT)
Dept: FAMILY MEDICINE CLINIC | Facility: CLINIC | Age: 24
End: 2019-03-05
Payer: MEDICAID

## 2019-03-05 VITALS
WEIGHT: 238 LBS | HEART RATE: 95 BPM | TEMPERATURE: 98 F | BODY MASS INDEX: 43 KG/M2 | SYSTOLIC BLOOD PRESSURE: 120 MMHG | OXYGEN SATURATION: 98 % | DIASTOLIC BLOOD PRESSURE: 70 MMHG

## 2019-03-05 DIAGNOSIS — G47.33 OSA (OBSTRUCTIVE SLEEP APNEA): Primary | ICD-10-CM

## 2019-03-05 PROCEDURE — 99214 OFFICE O/P EST MOD 30 MIN: CPT | Performed by: INTERNAL MEDICINE

## 2019-03-05 RX ORDER — ERGOCALCIFEROL 1.25 MG/1
50000 CAPSULE ORAL WEEKLY
Qty: 24 CAPSULE | Refills: 0 | Status: SHIPPED | OUTPATIENT
Start: 2019-03-05 | End: 2019-06-03

## 2019-03-05 NOTE — PROGRESS NOTES
Stephany Morton is a 21year old female. HPI:   Pt has been having some breakthrough bleeding after stopping the OCP for vomiting. 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   I don't Past Medical History:   Diagnosis Date   • ADD (attention deficit disorder with hyperactivity)    • Depression    • Fatty liver    • Obesity, unspecified    • PCOS (polycystic ovarian syndrome)       Social History:  Social History    Tobacco Use      Sm

## 2019-03-18 ENCOUNTER — TELEPHONE (OUTPATIENT)
Dept: FAMILY MEDICINE CLINIC | Facility: CLINIC | Age: 24
End: 2019-03-18

## 2019-03-18 RX ORDER — ARIPIPRAZOLE 5 MG/1
5 TABLET ORAL DAILY
Qty: 30 TABLET | Refills: 0 | Status: SHIPPED | OUTPATIENT
Start: 2019-03-18 | End: 2019-11-12

## 2019-03-18 NOTE — TELEPHONE ENCOUNTER
Mom asked if an authorization is needed for the sleep study, advised per the referral department no authorization is needed,

## 2019-03-21 ENCOUNTER — TELEPHONE (OUTPATIENT)
Dept: FAMILY MEDICINE CLINIC | Facility: CLINIC | Age: 24
End: 2019-03-21

## 2019-03-21 RX ORDER — METFORMIN HYDROCHLORIDE 500 MG/1
1000 TABLET, EXTENDED RELEASE ORAL
Qty: 180 TABLET | Refills: 0 | Status: SHIPPED | OUTPATIENT
Start: 2019-03-21 | End: 2019-08-22

## 2019-04-01 RX ORDER — ARIPIPRAZOLE 5 MG/1
TABLET ORAL
Qty: 30 TABLET | Refills: 0 | OUTPATIENT
Start: 2019-04-01

## 2019-04-01 RX ORDER — FENOFIBRATE 67 MG/1
CAPSULE ORAL
Qty: 90 CAPSULE | Refills: 0 | Status: SHIPPED | OUTPATIENT
Start: 2019-04-01 | End: 2019-10-29

## 2019-04-01 RX ORDER — SUMATRIPTAN 100 MG/1
TABLET, FILM COATED ORAL
Qty: 9 TABLET | Refills: 0 | Status: SHIPPED | OUTPATIENT
Start: 2019-04-01 | End: 2019-08-21

## 2019-04-01 NOTE — TELEPHONE ENCOUNTER
Amphetamine-Dextroamphet ER (ADDERALL XR) 30 MG Oral Capsule SR 24 Hr  PLEASE REFILL, ANDRE WILL  WHEN READY.   HER MEDS WERE LOST IN THE MOVE

## 2019-04-01 NOTE — TELEPHONE ENCOUNTER
Last OV: 3/5/2019  Aripiprazole: refused: filled 3/18/2019 #30 no RF  Sumatriptan: 3/31/2018 #9 no RF  Fenofibrate: 4/25/2018 #90 no RF

## 2019-04-02 RX ORDER — DEXTROAMPHETAMINE SACCHARATE, AMPHETAMINE ASPARTATE MONOHYDRATE, DEXTROAMPHETAMINE SULFATE AND AMPHETAMINE SULFATE 7.5; 7.5; 7.5; 7.5 MG/1; MG/1; MG/1; MG/1
30 CAPSULE, EXTENDED RELEASE ORAL DAILY
Qty: 30 CAPSULE | Refills: 0 | Status: SHIPPED | OUTPATIENT
Start: 2019-04-02 | End: 2019-05-02

## 2019-04-12 ENCOUNTER — TELEPHONE (OUTPATIENT)
Dept: FAMILY MEDICINE CLINIC | Facility: CLINIC | Age: 24
End: 2019-04-12

## 2019-04-12 NOTE — TELEPHONE ENCOUNTER
Records request received from [facility] for labs from 4/18-present. Labs faxed to [phone] and confirmation page received.

## 2019-04-18 ENCOUNTER — OFFICE VISIT (OUTPATIENT)
Dept: FAMILY MEDICINE CLINIC | Facility: CLINIC | Age: 24
End: 2019-04-18
Payer: MEDICAID

## 2019-04-18 VITALS
RESPIRATION RATE: 16 BRPM | OXYGEN SATURATION: 98 % | SYSTOLIC BLOOD PRESSURE: 112 MMHG | HEART RATE: 80 BPM | TEMPERATURE: 98 F | DIASTOLIC BLOOD PRESSURE: 72 MMHG | BODY MASS INDEX: 43.46 KG/M2 | WEIGHT: 242.19 LBS | HEIGHT: 62.5 IN

## 2019-04-18 DIAGNOSIS — G47.10 HYPERSOMNIA: ICD-10-CM

## 2019-04-18 DIAGNOSIS — F90.8 ATTENTION DEFICIT HYPERACTIVITY DISORDER (ADHD), OTHER TYPE: ICD-10-CM

## 2019-04-18 DIAGNOSIS — G47.8 SLEEP AROUSAL DISORDER: ICD-10-CM

## 2019-04-18 DIAGNOSIS — G47.19 EXCESSIVE DAYTIME SLEEPINESS: Primary | ICD-10-CM

## 2019-04-18 PROCEDURE — 99214 OFFICE O/P EST MOD 30 MIN: CPT | Performed by: FAMILY MEDICINE

## 2019-04-18 NOTE — PATIENT INSTRUCTIONS
You have been scheduled for a PSG  titration at [facility]. Please  Call the sleep center at  to review forms and receive forms to fill out in the mail. The sleep center will pre-certify your study.  If they have any diff nighttime sleep. 5. Exercise regularly. Regular exercise can improve sleep quality and duration. However, exercising immediately before bedtime can have a stimulant effect on the body and should be avoided.  Try to finish exercising at least three hours bef progressive muscle relaxation (perhaps with audio tapes), deep breathing techniques, imagery, and meditation.   11.   '

## 2019-04-18 NOTE — PROGRESS NOTES
[facility]  SLEEP PROGRESS NOTE        HPI:   This is a 21year old female coming in for Patient presents with:  Consult: Sleep consult      HPI:  Pt is here at the request of:   She notes that she has a very hard time falling asleep. >16 inches (40 cm)?  1   Gender Male? 0   Stop Bang Score 3   Obstructive Sleep Apnea Risk High Risk of GURWINDER       No results found for: IRON, IRONTOT, TIBC, IRONSAT, TRANSFERRIN, TIBCP, IRONBIND, SAT, SATUR  No results found for: PHUONG  Lab Results   Componen Rfl: 0   SUMAtriptan Succinate (IMITREX) 100 MG Oral Tab Take 1 tablet (100 mg total) by mouth every 2 (two) hours as needed for Migraine. Disp: 9 tablet Rfl: 1   escitalopram 20 MG Oral Tab Take 1 tablet (20 mg total) by mouth daily.  (Patient taking diffe lb  09/06/17 : 238 lb 8 oz    BP Readings from Last 3 Encounters:  04/18/19 : 112/72  03/05/19 : 120/70  12/26/18 : 118/80    Ideal body weight: 112 lb 15.8 oz  Adjusted ideal body weight: 164 lb 10.7 oz    Vital signs reviewed.   Physical Exam   [facility] study. If they have any difficulties they will contact you. My nurse will call you when your study is read and orders have been sent to [facility]. Call [facility] [phone] to  machine after called by our nurses.       Follow up with me 2 jerad least three hours before you plan to retire for the night. 6. Limit activities in bed. The bed is for sleeping and having sex and that's it. Do not use the computer or smart phone, watch TV, catch up on work or listen to music while in bed.  All these activ abnormal heart rhythm  and death,  increased risk of driving accidents. Advised to refrain from driving when sleepy. COMPLIANCE is required by insurance for 4 hours a night most nights of the week.   Recommend weight loss, and maintain and optimal BMI

## 2019-04-24 ENCOUNTER — TELEPHONE (OUTPATIENT)
Dept: FAMILY MEDICINE CLINIC | Facility: CLINIC | Age: 24
End: 2019-04-24

## 2019-04-24 DIAGNOSIS — R06.83 SNORING: ICD-10-CM

## 2019-04-24 DIAGNOSIS — G47.8 SLEEP AROUSAL DISORDER: Primary | ICD-10-CM

## 2019-04-24 DIAGNOSIS — G47.10 HYPERSOMNIA: ICD-10-CM

## 2019-04-25 NOTE — TELEPHONE ENCOUNTER
Recommend in lab PSG, however the insurance company Is demanding a inferior study, a home sleep study, which may in the long run incur more health care costs. Sleep arousal disorder cannot be determined through a Home sleep study.    Ok to order a Home slee

## 2019-05-01 ENCOUNTER — OFFICE VISIT (OUTPATIENT)
Dept: FAMILY MEDICINE CLINIC | Facility: CLINIC | Age: 24
End: 2019-05-01
Payer: MEDICAID

## 2019-05-01 VITALS
DIASTOLIC BLOOD PRESSURE: 88 MMHG | TEMPERATURE: 98 F | SYSTOLIC BLOOD PRESSURE: 116 MMHG | WEIGHT: 244 LBS | BODY MASS INDEX: 44 KG/M2

## 2019-05-01 DIAGNOSIS — E28.2 PCO (POLYCYSTIC OVARIES): ICD-10-CM

## 2019-05-01 DIAGNOSIS — G47.10 HYPERSOMNIA: ICD-10-CM

## 2019-05-01 DIAGNOSIS — G47.8 SLEEP AROUSAL DISORDER: Primary | ICD-10-CM

## 2019-05-01 PROCEDURE — 99214 OFFICE O/P EST MOD 30 MIN: CPT | Performed by: INTERNAL MEDICINE

## 2019-05-01 RX ORDER — DEXTROAMPHETAMINE SACCHARATE, AMPHETAMINE ASPARTATE MONOHYDRATE, DEXTROAMPHETAMINE SULFATE AND AMPHETAMINE SULFATE 7.5; 7.5; 7.5; 7.5 MG/1; MG/1; MG/1; MG/1
30 CAPSULE, EXTENDED RELEASE ORAL DAILY
Qty: 30 CAPSULE | Refills: 0 | Status: SHIPPED | OUTPATIENT
Start: 2019-05-31 | End: 2019-06-30

## 2019-05-01 RX ORDER — DEXTROAMPHETAMINE SACCHARATE, AMPHETAMINE ASPARTATE MONOHYDRATE, DEXTROAMPHETAMINE SULFATE AND AMPHETAMINE SULFATE 7.5; 7.5; 7.5; 7.5 MG/1; MG/1; MG/1; MG/1
30 CAPSULE, EXTENDED RELEASE ORAL DAILY
Qty: 30 CAPSULE | Refills: 0 | Status: SHIPPED | OUTPATIENT
Start: 2019-06-30 | End: 2019-07-30

## 2019-05-01 RX ORDER — DEXTROAMPHETAMINE SACCHARATE, AMPHETAMINE ASPARTATE MONOHYDRATE, DEXTROAMPHETAMINE SULFATE AND AMPHETAMINE SULFATE 7.5; 7.5; 7.5; 7.5 MG/1; MG/1; MG/1; MG/1
30 CAPSULE, EXTENDED RELEASE ORAL DAILY
Qty: 30 CAPSULE | Refills: 0 | Status: SHIPPED | OUTPATIENT
Start: 2019-05-01 | End: 2019-05-31

## 2019-05-01 NOTE — PROGRESS NOTES
Lula Vides is a 21year old female. HPI:   Pt has been doing ok. Tried all the time. Has a sleep study May 9th. She has a job at [employer] now and working night shift. She has been taking medications. Mother is here with her today.   She has not had a Take 1 tablet (100 mg total) by mouth every 2 (two) hours as needed for Migraine. Disp: 9 tablet Rfl: 1   escitalopram 20 MG Oral Tab Take 1 tablet (20 mg total) by mouth daily.  (Patient taking differently: Take 30 mg by mouth daily.  ) Disp: 30 tablet Rfl provera to follow. No orders of the defined types were placed in this encounter.       Meds & Refills for this Visit:  Requested Prescriptions     Signed Prescriptions Disp Refills   • Progesterone Micronized 200 MG Oral Cap 14 capsule 0     Sig: Take 1 c

## 2019-05-09 NOTE — TELEPHONE ENCOUNTER
calling to confirm sleep study for tonight  ( has not heard anything back after multiple calls to [facility] office )    please Call back ASAP

## 2019-05-09 NOTE — TELEPHONE ENCOUNTER
Patient just called, she found out that her insurance denied her sleep study tonight at [facility]. She would like to know what's next?

## 2019-05-09 NOTE — TELEPHONE ENCOUNTER
Note in chart from yesterday. Call to cancel study and then need Peer to Peer review   Please start peer to peer review.    [phone]

## 2019-05-13 NOTE — TELEPHONE ENCOUNTER
Per Dr. Scott Pereira, [facility] to start with home sleep study vs trying for prior auth. LM for Elissa to return call.

## 2019-05-13 NOTE — TELEPHONE ENCOUNTER
Spoke with Royce Guerra with [employer] [facility]. States patient's case # is [id number]. Per Royce Guerra, repeat sleep study was denied stating it is not needed based on information provided.   Royce Guerra states an appeal can be written by the provider and faxed to

## 2019-05-13 NOTE — TELEPHONE ENCOUNTER
LM for patient's mother Linda Chacon to return call. Will need authorization form completed in order to speak with insurance. Form in [facility] \"Pending Documents Folder. \"  If patient willing to sign, will need Dr. Isidoro Abdi to write letter of appeal also.

## 2019-05-15 ENCOUNTER — TELEPHONE (OUTPATIENT)
Dept: FAMILY MEDICINE CLINIC | Facility: CLINIC | Age: 24
End: 2019-05-15

## 2019-05-15 NOTE — TELEPHONE ENCOUNTER
Patient's mother Michela Grayson informed of the below. States they live only 5 mins away from the [facility] sleep center so she would like to  the home sleep study machine there.   Phone number to the [facility] sleep lab given to mother to call and schedule

## 2019-05-15 NOTE — TELEPHONE ENCOUNTER
Letter received that patient was provided a 90 days supply but needs prior authorization after these 90 days. Prior authorization initiated with Blanca Gutierrez. At [employer] he will have the form faxed to us.

## 2019-05-17 ENCOUNTER — TELEPHONE (OUTPATIENT)
Dept: FAMILY MEDICINE CLINIC | Facility: CLINIC | Age: 24
End: 2019-05-17

## 2019-05-17 NOTE — TELEPHONE ENCOUNTER
Advised patient what insurance is asking for. She is going to request psychiatrist's office forward the pertinent notes.

## 2019-05-17 NOTE — TELEPHONE ENCOUNTER
Message left for Tay Laureano to call back. We need to obtian records from her psychiatrist. Luz Elena Huntley representative from [facility] therapeutics also.   ELIJAH Garcia

## 2019-05-17 NOTE — TELEPHONE ENCOUNTER
NEED OFFICE NOTES THAT PERTAIN TO PRIOR AUTH FORM THAT WAS FILLED OUT FOR [facility], FAX TO # [phone], REF # [id number] IF QUESTIONS, WILL ALSO FAX OVER CORRECT FORM TO OUR OFFICE

## 2019-05-22 ENCOUNTER — TELEPHONE (OUTPATIENT)
Dept: FAMILY MEDICINE CLINIC | Facility: CLINIC | Age: 24
End: 2019-05-22

## 2019-05-22 NOTE — TELEPHONE ENCOUNTER
NA, attempted to call Mother to get information on patient's psychiatrist. We need to get the name and number of her psychiatrist to send information to them regarding prior authorization on Adderall.  [employer] is requesting that the psychiatrist

## 2019-05-23 NOTE — TELEPHONE ENCOUNTER
Patient states that she did tell her phsychiatrist's office [facility] that she needs a prior authorization for her Adderall. Fax from [employer] faxed to [employer] [phone].  Patient's Mother states [place]

## 2019-05-29 ENCOUNTER — OFFICE VISIT (OUTPATIENT)
Dept: SLEEP CENTER | Age: 24
End: 2019-05-29
Attending: FAMILY MEDICINE
Payer: MEDICAID

## 2019-05-29 DIAGNOSIS — G47.10 HYPERSOMNIA: ICD-10-CM

## 2019-05-29 DIAGNOSIS — G47.8 SLEEP AROUSAL DISORDER: ICD-10-CM

## 2019-05-29 DIAGNOSIS — G47.33 OSA (OBSTRUCTIVE SLEEP APNEA): ICD-10-CM

## 2019-05-29 PROCEDURE — 95806 SLEEP STUDY UNATT&RESP EFFT: CPT

## 2019-06-03 ENCOUNTER — TELEPHONE (OUTPATIENT)
Dept: FAMILY MEDICINE CLINIC | Facility: CLINIC | Age: 24
End: 2019-06-03

## 2019-06-03 NOTE — TELEPHONE ENCOUNTER
Spoke with pt's mother in regards to sleep study results. Awaiting results but should be available in the next 1-2 weeks. appt scheduled for pt r/u in [place] for results/recommendations.   Future Appointments   Date Time Provider Ervin Steiner   6/5/

## 2019-06-03 NOTE — TELEPHONE ENCOUNTER
Mother states patient had a sleep study done and they are consfused on what the next steps are.  Requested a call back

## 2019-06-05 ENCOUNTER — APPOINTMENT (OUTPATIENT)
Dept: LAB | Age: 24
End: 2019-06-05
Attending: INTERNAL MEDICINE
Payer: MEDICAID

## 2019-06-05 ENCOUNTER — OFFICE VISIT (OUTPATIENT)
Dept: FAMILY MEDICINE CLINIC | Facility: CLINIC | Age: 24
End: 2019-06-05
Payer: MEDICAID

## 2019-06-05 VITALS
SYSTOLIC BLOOD PRESSURE: 110 MMHG | DIASTOLIC BLOOD PRESSURE: 70 MMHG | BODY MASS INDEX: 44.86 KG/M2 | HEART RATE: 81 BPM | HEIGHT: 62.5 IN | OXYGEN SATURATION: 99 % | WEIGHT: 250 LBS | TEMPERATURE: 98 F

## 2019-06-05 DIAGNOSIS — E28.2 PCO (POLYCYSTIC OVARIES): Primary | ICD-10-CM

## 2019-06-05 DIAGNOSIS — IMO0001 CONTRACEPTION: ICD-10-CM

## 2019-06-05 DIAGNOSIS — E28.2 PCO (POLYCYSTIC OVARIES): ICD-10-CM

## 2019-06-05 LAB
ALBUMIN SERPL-MCNC: 3.4 G/DL (ref 3.4–5)
ALBUMIN/GLOB SERPL: 0.8 {RATIO} (ref 1–2)
ALP LIVER SERPL-CCNC: 80 U/L (ref 52–144)
ALT SERPL-CCNC: 151 U/L (ref 13–56)
ANION GAP SERPL CALC-SCNC: 4 MMOL/L (ref 0–18)
AST SERPL-CCNC: 101 U/L (ref 15–37)
BILIRUB SERPL-MCNC: 0.4 MG/DL (ref 0.1–2)
BUN BLD-MCNC: 6 MG/DL (ref 7–18)
BUN/CREAT SERPL: 9.2 (ref 10–20)
CALCIUM BLD-MCNC: 9.5 MG/DL (ref 8.5–10.1)
CHLORIDE SERPL-SCNC: 109 MMOL/L (ref 98–112)
CHOLEST SMN-MCNC: 176 MG/DL (ref ?–200)
CO2 SERPL-SCNC: 27 MMOL/L (ref 21–32)
CREAT BLD-MCNC: 0.65 MG/DL (ref 0.55–1.02)
EST. AVERAGE GLUCOSE BLD GHB EST-MCNC: 126 MG/DL (ref 68–126)
GLOBULIN PLAS-MCNC: 4.2 G/DL (ref 2.8–4.4)
GLUCOSE BLD-MCNC: 99 MG/DL (ref 70–99)
HBA1C MFR BLD HPLC: 6 % (ref ?–5.7)
HDLC SERPL-MCNC: 39 MG/DL (ref 40–59)
LDLC SERPL CALC-MCNC: 100 MG/DL (ref ?–100)
M PROTEIN MFR SERPL ELPH: 7.6 G/DL (ref 6.4–8.2)
NONHDLC SERPL-MCNC: 137 MG/DL (ref ?–130)
OSMOLALITY SERPL CALC.SUM OF ELEC: 288 MOSM/KG (ref 275–295)
POTASSIUM SERPL-SCNC: 4.1 MMOL/L (ref 3.5–5.1)
SODIUM SERPL-SCNC: 140 MMOL/L (ref 136–145)
TRIGL SERPL-MCNC: 185 MG/DL (ref 30–149)
VLDLC SERPL CALC-MCNC: 37 MG/DL (ref 0–30)

## 2019-06-05 PROCEDURE — 96372 THER/PROPH/DIAG INJ SC/IM: CPT | Performed by: INTERNAL MEDICINE

## 2019-06-05 PROCEDURE — 80061 LIPID PANEL: CPT

## 2019-06-05 PROCEDURE — 80053 COMPREHEN METABOLIC PANEL: CPT

## 2019-06-05 PROCEDURE — 99214 OFFICE O/P EST MOD 30 MIN: CPT | Performed by: INTERNAL MEDICINE

## 2019-06-05 PROCEDURE — 36415 COLL VENOUS BLD VENIPUNCTURE: CPT

## 2019-06-05 PROCEDURE — 83036 HEMOGLOBIN GLYCOSYLATED A1C: CPT

## 2019-06-05 PROCEDURE — 81025 URINE PREGNANCY TEST: CPT | Performed by: INTERNAL MEDICINE

## 2019-06-05 RX ORDER — MEDROXYPROGESTERONE ACETATE 150 MG/ML
150 INJECTION, SUSPENSION INTRAMUSCULAR ONCE
Status: DISCONTINUED | OUTPATIENT
Start: 2019-06-05 | End: 2019-06-05

## 2019-06-05 RX ORDER — MEDROXYPROGESTERONE ACETATE 150 MG/ML
150 INJECTION, SUSPENSION INTRAMUSCULAR ONCE
Status: COMPLETED | OUTPATIENT
Start: 2019-06-05 | End: 2019-06-05

## 2019-06-05 RX ORDER — ERGOCALCIFEROL (VITAMIN D2) 1250 MCG
50000 CAPSULE ORAL WEEKLY
COMMUNITY
End: 2020-05-06

## 2019-06-05 RX ADMIN — MEDROXYPROGESTERONE ACETATE 150 MG: 150 INJECTION, SUSPENSION INTRAMUSCULAR at 11:21:00

## 2019-06-05 NOTE — PROGRESS NOTES
Claudia Pantoja is a 21year old female. HPI:   Pt has been well, no period and still gaining weight. 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   She has been in psychiatric care and they are taking over the total) by mouth daily. (Patient taking differently: Take 30 mg by mouth daily.  ) Disp: 30 tablet Rfl: 0   Amphetamine-Dextroamphet ER 30 MG Oral Capsule SR 24 Hr Take 1 capsule (30 mg total) by mouth every morning.  Disp: 30 capsule Rfl: 0      Past [facility] No prescriptions requested or ordered in this encounter       Imaging & Consults:  None    Follow up as needed. The patient indicates understanding of these issues and agrees to the plan.

## 2019-06-06 ENCOUNTER — SLEEP STUDY (OUTPATIENT)
Dept: FAMILY MEDICINE CLINIC | Facility: CLINIC | Age: 24
End: 2019-06-06
Payer: MEDICAID

## 2019-06-06 DIAGNOSIS — R73.09 ELEVATED HEMOGLOBIN A1C: Primary | ICD-10-CM

## 2019-06-06 DIAGNOSIS — G47.33 OBSTRUCTIVE SLEEP APNEA: Primary | ICD-10-CM

## 2019-06-06 PROCEDURE — 95806 SLEEP STUDY UNATT&RESP EFFT: CPT | Performed by: FAMILY MEDICINE

## 2019-06-10 ENCOUNTER — TELEPHONE (OUTPATIENT)
Dept: FAMILY MEDICINE CLINIC | Facility: CLINIC | Age: 24
End: 2019-06-10

## 2019-06-10 DIAGNOSIS — G47.33 OSA (OBSTRUCTIVE SLEEP APNEA): Primary | ICD-10-CM

## 2019-06-10 PROBLEM — Z99.89 OSA ON CPAP: Status: ACTIVE | Noted: 2019-06-10

## 2019-06-10 NOTE — TELEPHONE ENCOUNTER
Please call patient and mother. Discuss auto pap vs. cpap titration. Needs appointment in  Follow up scheduled as follows. Suggest trial of auto cpap until appointment through [address] [place] [address], [place].      Your Appointments    Thursday July 25, 2019  8:50 AM PK Correa

## 2019-06-10 NOTE — PROCEDURES
[address] [address],[facility]       Accredited by the [facility] (AASM)    PATIENT'S NAME:        vanessa Agent  ATTENDING PHYSICIAN:   Millie Livingston MD  REFERRING PHYSICIAN:   Darryn Leavitt.  Jaye Geren MD  PATIENT apnea.   7.   For periodic limb movement disorder, recommend evaluation of vitamin D and B12, ferritin, and evaluation for diabetes, renal disease, and thyroid disorders. Dictated By Kirby Palmer MD  d: 06/10/2019 07:51:19  t: 06/10/2019 09:15:39  J

## 2019-06-14 NOTE — TELEPHONE ENCOUNTER
Spoke with mother and advised CPAP orders were being sent to [facility]. Updated flowsheet.     Please place order for CPAP

## 2019-06-25 ENCOUNTER — MED REC SCAN ONLY (OUTPATIENT)
Dept: FAMILY MEDICINE CLINIC | Facility: CLINIC | Age: 24
End: 2019-06-25

## 2019-06-28 ENCOUNTER — TELEPHONE (OUTPATIENT)
Dept: FAMILY MEDICINE CLINIC | Facility: CLINIC | Age: 24
End: 2019-06-28

## 2019-06-28 NOTE — TELEPHONE ENCOUNTER
MOM HAS QUESTIONS ABOUT SHOT PT RECEIVED ONE MONTH AGO FOR HER POLYCYSTIC OVARIAN SYNDROME. PT HAS NOT GOTTEN HER PERIOD YET. IS THAT A CONCERN?  PLEASE CALL

## 2019-06-28 NOTE — TELEPHONE ENCOUNTER
She received a Depo shot about a month ago on 6/5/19. NOTHING has happened and mom is confused. She was expecting that she would have started her period by now? She doesn't understand what to expect? Are they on the right tract?    She has not had any s

## 2019-06-29 NOTE — TELEPHONE ENCOUNTER
Discussed with mother.  Needs to continue Depo and if no period in 3 injections consider transvaginal U/S.

## 2019-06-29 NOTE — TELEPHONE ENCOUNTER
Will be unpredictable, this stops ovulation so it is saving her eggs and if no period it is NOT contributing to risk for endometrial cancer.

## 2019-07-22 ENCOUNTER — TELEPHONE (OUTPATIENT)
Dept: FAMILY MEDICINE CLINIC | Facility: CLINIC | Age: 24
End: 2019-07-22

## 2019-07-22 NOTE — TELEPHONE ENCOUNTER
Pt's mother requesting copy of sleep study completed. Pt's mother informed that she could obtain a copy directly from the [facility] [employer] office at her convenience.  Pt's mother verbalized understanding and stated that they do have f/u appt schedule w/ Dr. Jaye Green

## 2019-07-22 NOTE — TELEPHONE ENCOUNTER
Patients mom would like to know if Dr Marleny Win can fax patients sleep study that was done May to the [facility]. Pt has appointment with Dr Marleny Win 9/12 and mom would like a copy of the sleep study before patients appointment.  Mom would like a call when i

## 2019-08-19 ENCOUNTER — TELEPHONE (OUTPATIENT)
Dept: FAMILY MEDICINE CLINIC | Facility: CLINIC | Age: 24
End: 2019-08-19

## 2019-08-19 NOTE — TELEPHONE ENCOUNTER
Mom said that she just found out that [place] has had her period since the beginning of July and there are large amounts of blood clots and she has a lot of pain. She got the Depo Provera shot on June 5th and is due to have it again in September.

## 2019-08-21 ENCOUNTER — TELEPHONE (OUTPATIENT)
Dept: FAMILY MEDICINE CLINIC | Facility: CLINIC | Age: 24
End: 2019-08-21

## 2019-08-21 ENCOUNTER — LAB ENCOUNTER (OUTPATIENT)
Dept: LAB | Age: 24
End: 2019-08-21
Attending: INTERNAL MEDICINE
Payer: MEDICAID

## 2019-08-21 ENCOUNTER — OFFICE VISIT (OUTPATIENT)
Dept: FAMILY MEDICINE CLINIC | Facility: CLINIC | Age: 24
End: 2019-08-21
Payer: MEDICAID

## 2019-08-21 VITALS
WEIGHT: 261 LBS | TEMPERATURE: 98 F | HEART RATE: 72 BPM | BODY MASS INDEX: 47 KG/M2 | SYSTOLIC BLOOD PRESSURE: 120 MMHG | RESPIRATION RATE: 16 BRPM | DIASTOLIC BLOOD PRESSURE: 70 MMHG

## 2019-08-21 DIAGNOSIS — R79.89 ABNORMAL LIVER FUNCTION TESTS: ICD-10-CM

## 2019-08-21 DIAGNOSIS — E66.01 CLASS 2 SEVERE OBESITY DUE TO EXCESS CALORIES WITH SERIOUS COMORBIDITY AND BODY MASS INDEX (BMI) OF 36.0 TO 36.9 IN ADULT (HCC): ICD-10-CM

## 2019-08-21 DIAGNOSIS — E28.2 PCO (POLYCYSTIC OVARIES): ICD-10-CM

## 2019-08-21 DIAGNOSIS — N92.1 MENORRHAGIA WITH IRREGULAR CYCLE: ICD-10-CM

## 2019-08-21 DIAGNOSIS — R73.09 ELEVATED HEMOGLOBIN A1C: ICD-10-CM

## 2019-08-21 DIAGNOSIS — E28.2 PCO (POLYCYSTIC OVARIES): Primary | ICD-10-CM

## 2019-08-21 LAB
ALBUMIN SERPL-MCNC: 3.4 G/DL (ref 3.4–5)
ALBUMIN/GLOB SERPL: 0.8 {RATIO} (ref 1–2)
ALP LIVER SERPL-CCNC: 92 U/L (ref 52–144)
ALT SERPL-CCNC: 89 U/L (ref 13–56)
ANION GAP SERPL CALC-SCNC: 8 MMOL/L (ref 0–18)
AST SERPL-CCNC: 49 U/L (ref 15–37)
BASOPHILS # BLD AUTO: 0.05 X10(3) UL (ref 0–0.2)
BASOPHILS NFR BLD AUTO: 0.4 %
BILIRUB SERPL-MCNC: 0.3 MG/DL (ref 0.1–2)
BUN BLD-MCNC: 12 MG/DL (ref 7–18)
BUN/CREAT SERPL: 15.8 (ref 10–20)
CALCIUM BLD-MCNC: 8.2 MG/DL (ref 8.5–10.1)
CHLORIDE SERPL-SCNC: 108 MMOL/L (ref 98–112)
CO2 SERPL-SCNC: 23 MMOL/L (ref 21–32)
CREAT BLD-MCNC: 0.76 MG/DL (ref 0.55–1.02)
DEPRECATED RDW RBC AUTO: 37 FL (ref 35.1–46.3)
EOSINOPHIL # BLD AUTO: 0.37 X10(3) UL (ref 0–0.7)
EOSINOPHIL NFR BLD AUTO: 3.2 %
ERYTHROCYTE [DISTWIDTH] IN BLOOD BY AUTOMATED COUNT: 13 % (ref 11–15)
EST. AVERAGE GLUCOSE BLD GHB EST-MCNC: 174 MG/DL (ref 68–126)
GLOBULIN PLAS-MCNC: 4.1 G/DL (ref 2.8–4.4)
GLUCOSE BLD-MCNC: 295 MG/DL (ref 70–99)
HBA1C MFR BLD HPLC: 7.7 % (ref ?–5.7)
HCT VFR BLD AUTO: 37.6 % (ref 35–48)
HGB BLD-MCNC: 12.2 G/DL (ref 12–16)
IMM GRANULOCYTES # BLD AUTO: 0.13 X10(3) UL (ref 0–1)
IMM GRANULOCYTES NFR BLD: 1.1 %
LYMPHOCYTES # BLD AUTO: 2.64 X10(3) UL (ref 1–4)
LYMPHOCYTES NFR BLD AUTO: 23.1 %
M PROTEIN MFR SERPL ELPH: 7.5 G/DL (ref 6.4–8.2)
MCH RBC QN AUTO: 26 PG (ref 26–34)
MCHC RBC AUTO-ENTMCNC: 32.4 G/DL (ref 31–37)
MCV RBC AUTO: 80 FL (ref 80–100)
MONOCYTES # BLD AUTO: 0.73 X10(3) UL (ref 0.1–1)
MONOCYTES NFR BLD AUTO: 6.4 %
NEUTROPHILS # BLD AUTO: 7.52 X10 (3) UL (ref 1.5–7.7)
NEUTROPHILS # BLD AUTO: 7.52 X10(3) UL (ref 1.5–7.7)
NEUTROPHILS NFR BLD AUTO: 65.8 %
OSMOLALITY SERPL CALC.SUM OF ELEC: 299 MOSM/KG (ref 275–295)
PLATELET # BLD AUTO: 397 10(3)UL (ref 150–450)
POTASSIUM SERPL-SCNC: 3.9 MMOL/L (ref 3.5–5.1)
RBC # BLD AUTO: 4.7 X10(6)UL (ref 3.8–5.3)
SODIUM SERPL-SCNC: 139 MMOL/L (ref 136–145)
T4 FREE SERPL-MCNC: 1 NG/DL (ref 0.8–1.7)
TSI SER-ACNC: 2.16 MIU/ML (ref 0.36–3.74)
WBC # BLD AUTO: 11.4 X10(3) UL (ref 4–11)

## 2019-08-21 PROCEDURE — 99214 OFFICE O/P EST MOD 30 MIN: CPT | Performed by: INTERNAL MEDICINE

## 2019-08-21 PROCEDURE — 84439 ASSAY OF FREE THYROXINE: CPT

## 2019-08-21 PROCEDURE — 36415 COLL VENOUS BLD VENIPUNCTURE: CPT

## 2019-08-21 PROCEDURE — 84443 ASSAY THYROID STIM HORMONE: CPT

## 2019-08-21 PROCEDURE — 83036 HEMOGLOBIN GLYCOSYLATED A1C: CPT

## 2019-08-21 PROCEDURE — 80053 COMPREHEN METABOLIC PANEL: CPT

## 2019-08-21 PROCEDURE — 85025 COMPLETE CBC W/AUTO DIFF WBC: CPT

## 2019-08-21 RX ORDER — CLOBETASOL PROPIONATE 0.5 MG/G
1 OINTMENT TOPICAL 2 TIMES DAILY
Qty: 60 G | Refills: 3 | Status: SHIPPED | OUTPATIENT
Start: 2019-08-21 | End: 2019-09-20

## 2019-08-21 RX ORDER — MEDROXYPROGESTERONE ACETATE 150 MG/ML
150 INJECTION, SUSPENSION INTRAMUSCULAR
COMMUNITY
End: 2020-06-05

## 2019-08-21 NOTE — PROGRESS NOTES
Jamil Junior is a 21year old female. HPI:   Pt has had a period since mid-July. She was started in [facility]. She was alone for the last 2 weeks because her mother was in [place] and she did not seek help.   She has been working, gaining more weight, showin Used    Alcohol use: No      Alcohol/week: 0.0 standard drinks    Drug use: No       REVIEW OF SYSTEMS:   GENERAL HEALTH: feels well otherwise  SKIN: denies any unusual skin lesions or rashes  RESPIRATORY: denies shortness of breath with exertion  CARDIOVA

## 2019-08-21 NOTE — TELEPHONE ENCOUNTER
Mom advised that Dr Aby Booker wants her to wait until the labs come back and we will call her and let her know if she should continue the Depo. V.O.  Dr Wilmar Morales RN

## 2019-08-22 ENCOUNTER — TELEPHONE (OUTPATIENT)
Dept: FAMILY MEDICINE CLINIC | Facility: CLINIC | Age: 24
End: 2019-08-22

## 2019-08-22 DIAGNOSIS — E11.9 TYPE 2 DIABETES MELLITUS WITHOUT COMPLICATION, WITHOUT LONG-TERM CURRENT USE OF INSULIN (HCC): Primary | ICD-10-CM

## 2019-08-22 RX ORDER — METFORMIN HYDROCHLORIDE 500 MG/1
1000 TABLET, EXTENDED RELEASE ORAL
Qty: 180 TABLET | Refills: 0 | Status: SHIPPED | OUTPATIENT
Start: 2019-08-22 | End: 2019-10-29

## 2019-08-22 NOTE — TELEPHONE ENCOUNTER
----- Message from Vannesa Cain sent at 8/22/2019  2:21 PM CDT -----  MOM CALLED BACK: PER MOM THIS IS WHAT THE SCRIPT STATE:  METFORMIN TAKE 2 TABS  MG. EACH BY MOUTH DAILY WITH BREAKFAST.  PT. HAS ONLY 5 PILLS LEFT SO SHE IS ASKING FOR A REFILL TO

## 2019-08-22 NOTE — TELEPHONE ENCOUNTER
Mom advised that Dr Katrin Jay wants her to continue the Metformin 500mg two in the morning.  JERRY.O. Dr Oliver Simon RN

## 2019-08-22 NOTE — TELEPHONE ENCOUNTER
Pt has had difficulty with shedulign follow up. She should know she has sleep apnea from primary doc. Please lets start her on Auto titrating cpap 6-16 and then see her in follow up.        Your Appointments    Tuesday August 27, 2019 10:00 AM PK Sinha

## 2019-08-26 ENCOUNTER — TELEPHONE (OUTPATIENT)
Dept: FAMILY MEDICINE CLINIC | Facility: CLINIC | Age: 24
End: 2019-08-26

## 2019-08-26 ENCOUNTER — NURSE ONLY (OUTPATIENT)
Dept: ENDOCRINOLOGY CLINIC | Facility: CLINIC | Age: 24
End: 2019-08-26
Payer: MEDICAID

## 2019-08-26 VITALS — WEIGHT: 252.38 LBS | DIASTOLIC BLOOD PRESSURE: 68 MMHG | SYSTOLIC BLOOD PRESSURE: 106 MMHG | BODY MASS INDEX: 45 KG/M2

## 2019-08-26 DIAGNOSIS — E11.9 DIABETES MELLITUS WITHOUT COMPLICATION (HCC): Primary | ICD-10-CM

## 2019-08-26 PROCEDURE — 99211 OFF/OP EST MAY X REQ PHY/QHP: CPT | Performed by: DIETITIAN, REGISTERED

## 2019-08-26 RX ORDER — LANCETS 33 GAUGE
EACH MISCELLANEOUS
Qty: 1 BOX | Refills: 3 | Status: SHIPPED | OUTPATIENT
Start: 2019-08-26

## 2019-08-26 RX ORDER — BLOOD SUGAR DIAGNOSTIC
STRIP MISCELLANEOUS
Qty: 200 STRIP | Refills: 3 | Status: SHIPPED | OUTPATIENT
Start: 2019-08-26 | End: 2020-08-25

## 2019-08-26 NOTE — PROGRESS NOTES
Jamil Junior  : 1995 attended Step 1 Diabetic Education:  Pt. Accompanied by mother to appt.     Date: 2019  Referring Provider:Dr. Kiersten Shepard   Start time: 10:00am  End time: 11:30am     /68 (BP Location: Right arm, Patient Position: S blood glucose monitoring, testing schedules and target goals:   Fasting / Pre-meal  2 Hour Post-prandial 140-180  Demonstrated ability to perform blood glucose testing on:  Onetouch VerioFlex  Glucose today was 229 mg/dL ( post-breakfast)     Problem

## 2019-08-26 NOTE — TELEPHONE ENCOUNTER
Stephy asked what dose of Metformin patient should be on. Advised 500mg 2 tabs Qam for now. V.O. Dr Kailash Paul RN.

## 2019-08-27 ENCOUNTER — NURSE ONLY (OUTPATIENT)
Dept: FAMILY MEDICINE CLINIC | Facility: CLINIC | Age: 24
End: 2019-08-27
Payer: MEDICAID

## 2019-08-27 DIAGNOSIS — Z30.42 ENCOUNTER FOR SURVEILLANCE OF INJECTABLE CONTRACEPTIVE: Primary | ICD-10-CM

## 2019-08-27 PROCEDURE — 96372 THER/PROPH/DIAG INJ SC/IM: CPT | Performed by: INTERNAL MEDICINE

## 2019-08-27 RX ORDER — MEDROXYPROGESTERONE ACETATE 150 MG/ML
150 INJECTION, SUSPENSION INTRAMUSCULAR ONCE
Status: COMPLETED | OUTPATIENT
Start: 2019-08-27 | End: 2019-08-27

## 2019-08-27 RX ADMIN — MEDROXYPROGESTERONE ACETATE 150 MG: 150 INJECTION, SUSPENSION INTRAMUSCULAR at 09:18:00

## 2019-08-31 ENCOUNTER — TELEPHONE (OUTPATIENT)
Dept: FAMILY MEDICINE CLINIC | Facility: CLINIC | Age: 24
End: 2019-08-31

## 2019-08-31 NOTE — TELEPHONE ENCOUNTER
Mom said that she was looking at the after visit summary report and she is going to give it to the  so she can qualify for disability. Mom asked if the diagnosis can be changed for the Depo Provera from contraception to PCOS.

## 2019-08-31 NOTE — TELEPHONE ENCOUNTER
Mom advised, she said she would like to pick it up in [place]. Advised we will send it over there on Tuesday.

## 2019-10-07 ENCOUNTER — TELEPHONE (OUTPATIENT)
Dept: FAMILY MEDICINE CLINIC | Facility: CLINIC | Age: 24
End: 2019-10-07

## 2019-10-23 ENCOUNTER — TELEPHONE (OUTPATIENT)
Dept: FAMILY MEDICINE CLINIC | Facility: CLINIC | Age: 24
End: 2019-10-23

## 2019-10-24 ENCOUNTER — OFFICE VISIT (OUTPATIENT)
Dept: FAMILY MEDICINE CLINIC | Facility: CLINIC | Age: 24
End: 2019-10-24
Payer: MEDICAID

## 2019-10-24 VITALS
WEIGHT: 252.19 LBS | RESPIRATION RATE: 20 BRPM | TEMPERATURE: 98 F | BODY MASS INDEX: 45.25 KG/M2 | DIASTOLIC BLOOD PRESSURE: 74 MMHG | OXYGEN SATURATION: 98 % | SYSTOLIC BLOOD PRESSURE: 120 MMHG | HEIGHT: 62.5 IN | HEART RATE: 82 BPM

## 2019-10-24 DIAGNOSIS — Z99.89 OSA ON CPAP: Primary | ICD-10-CM

## 2019-10-24 DIAGNOSIS — G47.33 OSA ON CPAP: Primary | ICD-10-CM

## 2019-10-24 PROCEDURE — 99214 OFFICE O/P EST MOD 30 MIN: CPT | Performed by: NURSE PRACTITIONER

## 2019-10-24 NOTE — PATIENT INSTRUCTIONS
Continue sleep therapy. Increase compliance  Follow-up in 1 month - sooner if needed.      Advised if still with sleep apnea and not using CPAP has a  7 fold increase in risk of heart attack, stroke, abnormal heart rhythm  and death,  increased risk of dr

## 2019-10-24 NOTE — PROGRESS NOTES
[facility]  SLEEP PROGRESS NOTE        HPI:   This is a 25year old female coming in for Patient presents with:  Obstructive Sleep Apnea (GURWINDER): f/u start of cpap      HPI:     Present to review her sleep therapy.  Started the machine, but feel tired, fatigued, or sleepy during the day? - -   Has anyone observed you stop breathing during your sleep? - -   Do you have or are you being treated for high blood pressure? - -   BMI more than 35kg/mg2?  - -   Age over 48years old? - -   Neck circum 2.5 mg by mouth daily.  ), Disp: 30 tablet, Rfl: 0  BuPROPion HCl ER, XL, 150 MG Oral Tablet 24 Hr, Take 150 mg by mouth every morning., Disp: , Rfl: 0  SUMAtriptan Succinate (IMITREX) 100 MG Oral Tab, Take 1 tablet (100 mg total) by mouth every 2 (two) ho [facility] 120/70        Vital signs reviewed. Physical Exam   Constitutional: She is oriented to person, place, and time. She appears well-developed and well-nourished. HENT:   Head: Normocephalic and atraumatic.    Right Ear: External ear normal.   Left Ear: Ex risk of heart attack, stroke, abnormal heart rhythm  and death,  increased risk of driving accidents. Advised to refrain from driving when sleepy. COMPLIANCE is required by insurance for 4 hours a night most nights of the week.   Recommend weight loss,

## 2019-10-25 ENCOUNTER — TELEPHONE (OUTPATIENT)
Dept: FAMILY MEDICINE CLINIC | Facility: CLINIC | Age: 24
End: 2019-10-25

## 2019-10-28 ENCOUNTER — TELEPHONE (OUTPATIENT)
Dept: FAMILY MEDICINE CLINIC | Facility: CLINIC | Age: 24
End: 2019-10-28

## 2019-10-28 NOTE — TELEPHONE ENCOUNTER
Sent records request to [employer] pt. requesting we send records 1-1-16 to present to her Atty Simin Franco.

## 2019-10-29 ENCOUNTER — MED REC SCAN ONLY (OUTPATIENT)
Dept: FAMILY MEDICINE CLINIC | Facility: CLINIC | Age: 24
End: 2019-10-29

## 2019-10-29 NOTE — PROGRESS NOTES
Jonel Tena is a 25year old female. HPI:   Pt has been having increase anxiety and depression since her mothers sickness. She is trying to keep her job and help as well. She has problems with communication and speech fluency.  She certainly is poor tobacco: Never Used    Alcohol use:  Yes      Alcohol/week: 1.0 standard drinks      Types: 1 Glasses of wine per week      Comment: rare- sangria    Drug use: No       REVIEW OF SYSTEMS:   GENERAL HEALTH: feels well otherwise  SKIN: denies any unusual skin understanding of these issues and agrees to the plan.

## 2019-10-30 DIAGNOSIS — E11.69 DIABETES MELLITUS TYPE 2 IN OBESE (HCC): Primary | ICD-10-CM

## 2019-10-30 DIAGNOSIS — E66.9 DIABETES MELLITUS TYPE 2 IN OBESE (HCC): Primary | ICD-10-CM

## 2019-10-30 DIAGNOSIS — E28.2 PCO (POLYCYSTIC OVARIES): ICD-10-CM

## 2019-10-30 DIAGNOSIS — E66.01 CLASS 2 SEVERE OBESITY DUE TO EXCESS CALORIES WITH SERIOUS COMORBIDITY AND BODY MASS INDEX (BMI) OF 36.0 TO 36.9 IN ADULT (HCC): ICD-10-CM

## 2019-11-12 ENCOUNTER — OFFICE VISIT (OUTPATIENT)
Dept: FAMILY MEDICINE CLINIC | Facility: CLINIC | Age: 24
End: 2019-11-12
Payer: MEDICAID

## 2019-11-12 VITALS
OXYGEN SATURATION: 98 % | DIASTOLIC BLOOD PRESSURE: 64 MMHG | WEIGHT: 246 LBS | SYSTOLIC BLOOD PRESSURE: 120 MMHG | TEMPERATURE: 97 F | BODY MASS INDEX: 44 KG/M2 | HEART RATE: 85 BPM

## 2019-11-12 DIAGNOSIS — M26.629 TMJ SYNDROME: Primary | ICD-10-CM

## 2019-11-12 PROCEDURE — 99213 OFFICE O/P EST LOW 20 MIN: CPT | Performed by: INTERNAL MEDICINE

## 2019-11-12 RX ORDER — ARIPIPRAZOLE 5 MG/1
2.5 TABLET ORAL DAILY
Qty: 45 TABLET | Refills: 0 | Status: SHIPPED | OUTPATIENT
Start: 2019-11-12 | End: 2020-02-10

## 2019-11-12 RX ORDER — MELOXICAM 15 MG/1
15 TABLET ORAL DAILY
Qty: 30 TABLET | Refills: 0 | Status: SHIPPED | OUTPATIENT
Start: 2019-11-12 | End: 2019-12-11

## 2019-11-12 NOTE — PROGRESS NOTES
Debo Moncada is a 25year old female. HPI:   Pt has been having ear pain on the right with jaw movement for several days. She is not sure if she clinches at night, but suspects so due to jaw pain in the morning.   She cannot hear out of the left ear a use: Yes      Alcohol/week: 1.0 standard drinks      Types: 1 Glasses of wine per week      Comment: rare- sangria    Drug use: No       REVIEW OF SYSTEMS:   GENERAL HEALTH: feels well otherwise  SKIN: denies any unusual skin lesions or rashes  RESPIRATORY

## 2019-11-21 ENCOUNTER — OFFICE VISIT (OUTPATIENT)
Dept: FAMILY MEDICINE CLINIC | Facility: CLINIC | Age: 24
End: 2019-11-21
Payer: MEDICAID

## 2019-11-21 VITALS
SYSTOLIC BLOOD PRESSURE: 128 MMHG | RESPIRATION RATE: 18 BRPM | BODY MASS INDEX: 43.89 KG/M2 | HEIGHT: 62.5 IN | TEMPERATURE: 98 F | OXYGEN SATURATION: 98 % | HEART RATE: 98 BPM | DIASTOLIC BLOOD PRESSURE: 70 MMHG | WEIGHT: 244.63 LBS

## 2019-11-21 DIAGNOSIS — Z99.89 OSA ON CPAP: Primary | ICD-10-CM

## 2019-11-21 DIAGNOSIS — G47.33 OSA ON CPAP: Primary | ICD-10-CM

## 2019-11-21 DIAGNOSIS — E66.01 CLASS 2 SEVERE OBESITY DUE TO EXCESS CALORIES WITH SERIOUS COMORBIDITY AND BODY MASS INDEX (BMI) OF 36.0 TO 36.9 IN ADULT (HCC): ICD-10-CM

## 2019-11-21 PROCEDURE — 99214 OFFICE O/P EST MOD 30 MIN: CPT | Performed by: NURSE PRACTITIONER

## 2019-11-21 NOTE — PATIENT INSTRUCTIONS
Work on exercise and healthy the lifestyle choices. Increase compliance. Continue sleep therapy. Follow-up in 1 month - sooner if needed.      Advised if still with sleep apnea and not using CPAP has a  7 fold increase in risk of heart attack, stroke, a

## 2019-11-21 NOTE — PROGRESS NOTES
[facility]  SLEEP PROGRESS NOTE        HPI:   This is a 25year old female coming in for No chief complaint on file. HPI:   Patient is present to review her sleep therapy. Reviewed with patient that she is not meeting compliance. Do you often feel tired, fatigued, or sleepy during the day? - -   Has anyone observed you stop breathing during your sleep? - -   Do you have or are you being treated for high blood pressure? - -   BMI more than 35kg/mg2?  - -   Age over 48years old? - • ergocalciferol 42583 units Oral Cap Take 50,000 Units by mouth once a week. • BuPROPion HCl ER, XL, 150 MG Oral Tablet 24 Hr Take 150 mg by mouth every morning.   0   • SUMAtriptan Succinate (IMITREX) 100 MG Oral Tab Take 1 tablet (100 mg total) b signs reviewed. Physical Exam   Constitutional: She is oriented to person, place, and time. She appears well-developed and well-nourished. HENT:   Head: Normocephalic and atraumatic.    Right Ear: External ear normal.   Left Ear: External ear normal.   N chamber changed every 6 month  with the [employer] provider.          Advised if still with sleep apnea and not using CPAP has a  7 fold increase in risk of heart attack, stroke, abnormal heart rhythm  and death,  increased risk of driving ac

## 2019-12-11 RX ORDER — MELOXICAM 15 MG/1
TABLET ORAL
Qty: 30 TABLET | Refills: 0 | Status: SHIPPED | OUTPATIENT
Start: 2019-12-11

## 2019-12-11 NOTE — TELEPHONE ENCOUNTER
Last office visit: 11/12/19  Last refill: 11/12/19  Labs Due: 11/19/19  Future Appointments   Date Time Provider Ervin Obdulia   12/19/2019  2:00 PM BEATRIS [facility] [place]   12/30/2019  9:00 AM Victorino Del Rio MD EMGSW EM[facility] [place]

## 2019-12-18 RX ORDER — BUPROPION HYDROCHLORIDE 150 MG/1
TABLET ORAL
Qty: 30 TABLET | Refills: 0 | Status: SHIPPED | OUTPATIENT
Start: 2019-12-18 | End: 2020-11-23

## 2019-12-18 NOTE — TELEPHONE ENCOUNTER
Last office visit: 11/12/19  Last refill: 12/23/18  Labs Due: 11/21/19  Future Appointments   Date Time Provider Ervin Simpsoni   12/30/2019  9:00 AM Mode Dixon MD EMGSW [facility] [place]   1/23/2020  2:30 PM Ender Tejada APRN EMGYK EM[facility] [place]

## 2019-12-30 ENCOUNTER — TELEPHONE (OUTPATIENT)
Dept: FAMILY MEDICINE CLINIC | Facility: CLINIC | Age: 24
End: 2019-12-30

## 2019-12-30 ENCOUNTER — LAB ENCOUNTER (OUTPATIENT)
Dept: LAB | Age: 24
End: 2019-12-30
Attending: INTERNAL MEDICINE
Payer: MEDICAID

## 2019-12-30 ENCOUNTER — OFFICE VISIT (OUTPATIENT)
Dept: FAMILY MEDICINE CLINIC | Facility: CLINIC | Age: 24
End: 2019-12-30
Payer: MEDICAID

## 2019-12-30 VITALS
WEIGHT: 251.5 LBS | DIASTOLIC BLOOD PRESSURE: 70 MMHG | HEIGHT: 62.5 IN | TEMPERATURE: 98 F | HEART RATE: 72 BPM | SYSTOLIC BLOOD PRESSURE: 106 MMHG | BODY MASS INDEX: 45.12 KG/M2

## 2019-12-30 DIAGNOSIS — N92.1 MENOMETRORRHAGIA: ICD-10-CM

## 2019-12-30 DIAGNOSIS — Z30.9 ENCOUNTER FOR CONTRACEPTIVE MANAGEMENT, UNSPECIFIED TYPE: ICD-10-CM

## 2019-12-30 DIAGNOSIS — E28.2 PCO (POLYCYSTIC OVARIES): ICD-10-CM

## 2019-12-30 DIAGNOSIS — E66.01 CLASS 2 SEVERE OBESITY DUE TO EXCESS CALORIES WITH SERIOUS COMORBIDITY AND BODY MASS INDEX (BMI) OF 36.0 TO 36.9 IN ADULT (HCC): Primary | ICD-10-CM

## 2019-12-30 DIAGNOSIS — E66.01 CLASS 2 SEVERE OBESITY DUE TO EXCESS CALORIES WITH SERIOUS COMORBIDITY AND BODY MASS INDEX (BMI) OF 36.0 TO 36.9 IN ADULT (HCC): ICD-10-CM

## 2019-12-30 DIAGNOSIS — G47.33 OSA (OBSTRUCTIVE SLEEP APNEA): ICD-10-CM

## 2019-12-30 DIAGNOSIS — E66.9 DIABETES MELLITUS TYPE 2 IN OBESE (HCC): ICD-10-CM

## 2019-12-30 DIAGNOSIS — E11.69 DIABETES MELLITUS TYPE 2 IN OBESE (HCC): ICD-10-CM

## 2019-12-30 LAB
ALBUMIN SERPL-MCNC: 3.3 G/DL (ref 3.4–5)
ALBUMIN/GLOB SERPL: 0.8 {RATIO} (ref 1–2)
ALP LIVER SERPL-CCNC: 92 U/L (ref 37–98)
ALT SERPL-CCNC: 29 U/L (ref 13–56)
ANION GAP SERPL CALC-SCNC: 6 MMOL/L (ref 0–18)
AST SERPL-CCNC: 12 U/L (ref 15–37)
BASOPHILS # BLD AUTO: 0.06 X10(3) UL (ref 0–0.2)
BASOPHILS NFR BLD AUTO: 0.5 %
BILIRUB SERPL-MCNC: 0.3 MG/DL (ref 0.1–2)
BUN BLD-MCNC: 7 MG/DL (ref 7–18)
BUN/CREAT SERPL: 9.3 (ref 10–20)
CALCIUM BLD-MCNC: 9 MG/DL (ref 8.5–10.1)
CHLORIDE SERPL-SCNC: 110 MMOL/L (ref 98–112)
CHOLEST SMN-MCNC: 171 MG/DL (ref ?–200)
CO2 SERPL-SCNC: 25 MMOL/L (ref 21–32)
CREAT BLD-MCNC: 0.75 MG/DL (ref 0.55–1.02)
CREAT UR-SCNC: 232 MG/DL
DEPRECATED RDW RBC AUTO: 40.2 FL (ref 35.1–46.3)
EOSINOPHIL # BLD AUTO: 0.27 X10(3) UL (ref 0–0.7)
EOSINOPHIL NFR BLD AUTO: 2.2 %
ERYTHROCYTE [DISTWIDTH] IN BLOOD BY AUTOMATED COUNT: 14.1 % (ref 11–15)
EST. AVERAGE GLUCOSE BLD GHB EST-MCNC: 120 MG/DL (ref 68–126)
GLOBULIN PLAS-MCNC: 4.4 G/DL (ref 2.8–4.4)
GLUCOSE BLD-MCNC: 94 MG/DL (ref 70–99)
HBA1C MFR BLD HPLC: 5.8 % (ref ?–5.7)
HCT VFR BLD AUTO: 35.7 % (ref 35–48)
HDLC SERPL-MCNC: 35 MG/DL (ref 40–59)
HGB BLD-MCNC: 11 G/DL (ref 12–16)
IMM GRANULOCYTES # BLD AUTO: 0.09 X10(3) UL (ref 0–1)
IMM GRANULOCYTES NFR BLD: 0.7 %
LDLC SERPL CALC-MCNC: 99 MG/DL (ref ?–100)
LYMPHOCYTES # BLD AUTO: 2.57 X10(3) UL (ref 1–4)
LYMPHOCYTES NFR BLD AUTO: 20.8 %
M PROTEIN MFR SERPL ELPH: 7.7 G/DL (ref 6.4–8.2)
MCH RBC QN AUTO: 24.6 PG (ref 26–34)
MCHC RBC AUTO-ENTMCNC: 30.8 G/DL (ref 31–37)
MCV RBC AUTO: 79.9 FL (ref 80–100)
MICROALBUMIN UR-MCNC: 2.31 MG/DL
MICROALBUMIN/CREAT 24H UR-RTO: 10 UG/MG (ref ?–30)
MONOCYTES # BLD AUTO: 0.76 X10(3) UL (ref 0.1–1)
MONOCYTES NFR BLD AUTO: 6.1 %
NEUTROPHILS # BLD AUTO: 8.63 X10 (3) UL (ref 1.5–7.7)
NEUTROPHILS # BLD AUTO: 8.63 X10(3) UL (ref 1.5–7.7)
NEUTROPHILS NFR BLD AUTO: 69.7 %
NONHDLC SERPL-MCNC: 136 MG/DL (ref ?–130)
OSMOLALITY SERPL CALC.SUM OF ELEC: 290 MOSM/KG (ref 275–295)
PATIENT FASTING Y/N/NP: YES
PATIENT FASTING Y/N/NP: YES
PLATELET # BLD AUTO: 446 10(3)UL (ref 150–450)
POTASSIUM SERPL-SCNC: 4.1 MMOL/L (ref 3.5–5.1)
RBC # BLD AUTO: 4.47 X10(6)UL (ref 3.8–5.3)
SODIUM SERPL-SCNC: 141 MMOL/L (ref 136–145)
TRIGL SERPL-MCNC: 186 MG/DL (ref 30–149)
VLDLC SERPL CALC-MCNC: 37 MG/DL (ref 0–30)
WBC # BLD AUTO: 12.4 X10(3) UL (ref 4–11)

## 2019-12-30 PROCEDURE — 82043 UR ALBUMIN QUANTITATIVE: CPT

## 2019-12-30 PROCEDURE — 80053 COMPREHEN METABOLIC PANEL: CPT

## 2019-12-30 PROCEDURE — 81025 URINE PREGNANCY TEST: CPT | Performed by: INTERNAL MEDICINE

## 2019-12-30 PROCEDURE — 36415 COLL VENOUS BLD VENIPUNCTURE: CPT

## 2019-12-30 PROCEDURE — 83036 HEMOGLOBIN GLYCOSYLATED A1C: CPT

## 2019-12-30 PROCEDURE — 80061 LIPID PANEL: CPT

## 2019-12-30 PROCEDURE — 85025 COMPLETE CBC W/AUTO DIFF WBC: CPT

## 2019-12-30 PROCEDURE — 82570 ASSAY OF URINE CREATININE: CPT

## 2019-12-30 PROCEDURE — 99214 OFFICE O/P EST MOD 30 MIN: CPT | Performed by: INTERNAL MEDICINE

## 2019-12-30 PROCEDURE — 96372 THER/PROPH/DIAG INJ SC/IM: CPT | Performed by: INTERNAL MEDICINE

## 2019-12-30 RX ORDER — DEXTROAMPHETAMINE SACCHARATE, AMPHETAMINE ASPARTATE MONOHYDRATE, DEXTROAMPHETAMINE SULFATE AND AMPHETAMINE SULFATE 7.5; 7.5; 7.5; 7.5 MG/1; MG/1; MG/1; MG/1
30 CAPSULE, EXTENDED RELEASE ORAL EVERY MORNING
Qty: 30 CAPSULE | Refills: 0 | Status: SHIPPED | OUTPATIENT
Start: 2019-12-30 | End: 2020-12-15

## 2019-12-30 RX ORDER — MEDROXYPROGESTERONE ACETATE 150 MG/ML
150 INJECTION, SUSPENSION INTRAMUSCULAR ONCE
Status: COMPLETED | OUTPATIENT
Start: 2019-12-30 | End: 2019-12-30

## 2019-12-30 RX ADMIN — MEDROXYPROGESTERONE ACETATE 150 MG: 150 INJECTION, SUSPENSION INTRAMUSCULAR at 10:14:00

## 2019-12-30 NOTE — PROGRESS NOTES
Mervin Bowden is a 25year old female. HPI:   Pt has been having issues with vivid dreaming. She has not yet started a statin and \"lost\" her glucometer. She has not initiated DM education and has not corrected her diet.  She has not been taking her a hyperactivity)    • Depression    • Fatty liver    • Obesity, unspecified    • PCOS (polycystic ovarian syndrome)       Social History:  Social History    Tobacco Use      Smoking status: Never Smoker      Smokeless tobacco: Never Used    Alcohol use: Not Take 1 capsule (30 mg total) by mouth every morning. Imaging & Consults:  OP REFERRAL DIABETES FULL ED 10 HRS GROUP/IND    Follow up as needed. The patient indicates understanding of these issues and agrees to the plan.

## 2019-12-30 NOTE — TELEPHONE ENCOUNTER
Please call Bonnie from [facility]. She needs to speak with a nurse regarding prior auth for patients generic Adderall capsules.

## 2019-12-30 NOTE — TELEPHONE ENCOUNTER
Calling back-     They need office notes regarding the Adderall script.    I need to fax to [phone]; [phone]    This prior Gianluca Grand will  tomorrow 19 11:55am   She is asking that I fax the information now so that their department receives

## 2020-01-07 ENCOUNTER — NURSE ONLY (OUTPATIENT)
Dept: ENDOCRINOLOGY CLINIC | Facility: CLINIC | Age: 25
End: 2020-01-07
Payer: MEDICAID

## 2020-01-07 DIAGNOSIS — E66.01 CLASS 2 SEVERE OBESITY DUE TO EXCESS CALORIES WITH SERIOUS COMORBIDITY AND BODY MASS INDEX (BMI) OF 36.0 TO 36.9 IN ADULT (HCC): ICD-10-CM

## 2020-01-07 PROCEDURE — G0108 DIAB MANAGE TRN  PER INDIV: HCPCS | Performed by: DIETITIAN, REGISTERED

## 2020-01-14 NOTE — PROGRESS NOTES
Nabil Polanco  : 1995 was seen for Diabetic Education Follow up:    Date: 2020  Referring Provider: Dr. Ino Hummel  Start time: 11am End time: 11:30am    Assessment:      HgbA1C (%)   Date Value   2019 5.8 (H)      Changes since last vis

## 2020-01-20 ENCOUNTER — TELEPHONE (OUTPATIENT)
Dept: FAMILY MEDICINE CLINIC | Facility: CLINIC | Age: 25
End: 2020-01-20

## 2020-01-20 RX ORDER — METFORMIN HYDROCHLORIDE 500 MG/1
1000 TABLET, EXTENDED RELEASE ORAL
Qty: 180 TABLET | Refills: 3 | Status: SHIPPED | OUTPATIENT
Start: 2020-01-20 | End: 2020-02-10

## 2020-01-20 NOTE — TELEPHONE ENCOUNTER
Mom called and said the the diabetic educator Melinda Squires recommends that patient take the Metformin BID instead of 2 tablets in am, is this ok?

## 2020-01-20 NOTE — TELEPHONE ENCOUNTER
Last office visit: 12/30/19  Last refill: 10/29/19  Labs up to date  Future Appointments   Date Time Provider Ervin Steiner   1/21/2020 10:30 AM Sumaya Waller RN, CDE [facility] EMG DIAB [facility]   1/23/2020  2:30 PM Milagro Tejada, APRN EM[facility]K [facility]

## 2020-01-20 NOTE — TELEPHONE ENCOUNTER
Mom advised. She said that they are reminding each other to take the medication and she has been doing \"well\". Advised Dr Aby Booker is ok with this as long as she takes it, advised to set a reminder on her phone.

## 2020-01-21 ENCOUNTER — TELEPHONE (OUTPATIENT)
Dept: ENDOCRINOLOGY CLINIC | Facility: CLINIC | Age: 25
End: 2020-01-21

## 2020-01-21 ENCOUNTER — NURSE ONLY (OUTPATIENT)
Dept: ENDOCRINOLOGY CLINIC | Facility: CLINIC | Age: 25
End: 2020-01-21
Payer: MEDICAID

## 2020-01-21 DIAGNOSIS — E11.9 DIABETES MELLITUS WITHOUT COMPLICATION (HCC): Primary | ICD-10-CM

## 2020-01-21 PROCEDURE — 99211 OFF/OP EST MAY X REQ PHY/QHP: CPT | Performed by: DIETITIAN, REGISTERED

## 2020-01-21 NOTE — TELEPHONE ENCOUNTER
Pt.'s mother is interested in having her daughter wear a diagnostic [address] to continue her BG management. I have pended the order so please sign if agreeable.  Thanks

## 2020-01-23 ENCOUNTER — OFFICE VISIT (OUTPATIENT)
Dept: FAMILY MEDICINE CLINIC | Facility: CLINIC | Age: 25
End: 2020-01-23
Payer: MEDICAID

## 2020-01-23 VITALS
BODY MASS INDEX: 44 KG/M2 | WEIGHT: 245 LBS | DIASTOLIC BLOOD PRESSURE: 66 MMHG | SYSTOLIC BLOOD PRESSURE: 120 MMHG | OXYGEN SATURATION: 100 % | HEART RATE: 98 BPM | TEMPERATURE: 98 F

## 2020-01-23 DIAGNOSIS — G47.33 OSA ON CPAP: Primary | ICD-10-CM

## 2020-01-23 DIAGNOSIS — Z99.89 OSA ON CPAP: Primary | ICD-10-CM

## 2020-01-23 PROCEDURE — 99213 OFFICE O/P EST LOW 20 MIN: CPT | Performed by: NURSE PRACTITIONER

## 2020-01-23 NOTE — PROGRESS NOTES
[facility]  SLEEP PROGRESS NOTE        HPI:   This is a 25year old female coming in for Patient presents with:  Obstructive Sleep Apnea (GURWINDER)      HPI:     Present to review sleep therapy. States that she has been wearing every night.  Panfilo Cook PCOS (polycystic ovarian syndrome)      History reviewed. No pertinent surgical history. Social History:  Social History    Patient does not qualify to have social determinant information on file (likely too young).     Social History Narrative      Not on Migraine. 9 tablet 1   • escitalopram 20 MG Oral Tab Take 1 tablet (20 mg total) by mouth daily.  (Patient taking differently: Take 30 mg by mouth daily.  ) 30 tablet 0      Counseling given: Not Answered         Problem List:  Patient Active Problem List: range of motion. Neck supple. No thyromegaly present. Cardiovascular: Normal rate, regular rhythm, normal heart sounds and intact distal pulses. Pulmonary/Chest: Effort normal and breath sounds normal. No respiratory distress.  Musculoskeletal: Normal r

## 2020-01-23 NOTE — PATIENT INSTRUCTIONS
Waiting on compliance download from [facility]. Continue sleep therapy. Follow-up will be based on compliance report.      Advised if still with sleep apnea and not using CPAP has a  7 fold increase in risk of heart attack, stroke, abnormal h

## 2020-01-24 ENCOUNTER — TELEPHONE (OUTPATIENT)
Dept: FAMILY MEDICINE CLINIC | Facility: CLINIC | Age: 25
End: 2020-01-24

## 2020-01-24 NOTE — TELEPHONE ENCOUNTER
Received a compliance report from [address] on her [employer]. Patient is now showing compliance. Called and updated mother who has been present at her appointments of the compliance. Recommend recheck in 3 months. Mom states an understanding.

## 2020-01-26 VITALS — BODY MASS INDEX: 44 KG/M2 | WEIGHT: 246.63 LBS | SYSTOLIC BLOOD PRESSURE: 98 MMHG | DIASTOLIC BLOOD PRESSURE: 60 MMHG

## 2020-01-27 NOTE — PROGRESS NOTES
Stephanie Gil  : 1995 was seen for Diabetic Education Follow up:  Pt.  Accompanied by mother to visit  Date: 2020  Referring Provider: Dr. Jaylen Gomez  Start time: 10:30am End time: 11:00am    Assessment:     Assessment: BP 98/60 (BP Location: in 1 week    REDUCING RISKS:  - relationship between glucose control and risk for complications in eye, heart, kidneys,nerves,teeth,foot care, skin,  Foot Care Guidelines    HEALTHY COPING:  - doing better emotionally since mother is out of the hospital

## 2020-02-05 ENCOUNTER — TELEPHONE (OUTPATIENT)
Dept: FAMILY MEDICINE CLINIC | Facility: CLINIC | Age: 25
End: 2020-02-05

## 2020-02-10 RX ORDER — METFORMIN HYDROCHLORIDE 500 MG/1
1000 TABLET, EXTENDED RELEASE ORAL
Qty: 180 TABLET | Refills: 3 | Status: SHIPPED | OUTPATIENT
Start: 2020-02-10 | End: 2020-02-10

## 2020-02-10 RX ORDER — METFORMIN HYDROCHLORIDE 500 MG/1
1000 TABLET, EXTENDED RELEASE ORAL
Qty: 180 TABLET | Refills: 3 | OUTPATIENT
Start: 2020-02-10 | End: 2021-05-04

## 2020-02-10 NOTE — TELEPHONE ENCOUNTER
Last OV with Dr Davis Poon  Last Hgba1c 12/30/19  Last refill 1/20/20 to [facility] was for 500mg take 2 tabs BID. Mom said she has only been taking 500 1 tab daily. PLEASE CONFIRM DOSE.

## 2020-02-18 ENCOUNTER — NURSE ONLY (OUTPATIENT)
Dept: ENDOCRINOLOGY CLINIC | Facility: CLINIC | Age: 25
End: 2020-02-18
Payer: MEDICAID

## 2020-02-18 DIAGNOSIS — E11.9 DIABETES MELLITUS WITHOUT COMPLICATION (HCC): ICD-10-CM

## 2020-02-18 PROCEDURE — G0108 DIAB MANAGE TRN  PER INDIV: HCPCS | Performed by: DIETITIAN, REGISTERED

## 2020-02-23 VITALS — SYSTOLIC BLOOD PRESSURE: 92 MMHG | DIASTOLIC BLOOD PRESSURE: 58 MMHG | BODY MASS INDEX: 46 KG/M2 | WEIGHT: 258 LBS

## 2020-02-24 NOTE — PROGRESS NOTES
Ad Castro  : 1995 was seen for Diabetic Education Follow up:    Date: 2020  Referring Provider: Dr. Ap Oconnor  Start time: 3:00pm End time: 3:30pm    Assessment:     Assessment: BP 92/58 (BP Location: Left arm, Patient Position: Sitting,

## 2020-03-21 ENCOUNTER — TELEPHONE (OUTPATIENT)
Dept: ENDOCRINOLOGY CLINIC | Facility: CLINIC | Age: 25
End: 2020-03-21

## 2020-03-23 ENCOUNTER — TELEPHONE (OUTPATIENT)
Dept: FAMILY MEDICINE CLINIC | Facility: CLINIC | Age: 25
End: 2020-03-23

## 2020-03-23 DIAGNOSIS — B34.9 VIRAL SYNDROME: Primary | ICD-10-CM

## 2020-03-23 PROCEDURE — 99213 OFFICE O/P EST LOW 20 MIN: CPT | Performed by: FAMILY MEDICINE

## 2020-03-23 NOTE — TELEPHONE ENCOUNTER
Virtual/Telephone Check-In    Yordan Nicholas verbally consents to a [employer] on 03/23/20. Patient understands and accepts financial responsibility for any deductible, co-insurance and/or co-pays associated with this service.

## 2020-03-23 NOTE — TELEPHONE ENCOUNTER
Called patient's mom back. Ok'd TELEMED call, knows there is a charge. Ok to call patient Rich Guerrero [phone]. Dr. Yesenia Adrian will call her.

## 2020-03-23 NOTE — TELEPHONE ENCOUNTER
Called patient's mom(Kateryna) back. Has not traveled anywhere in the last 30 days. Patient does work at [employer], so mom is not sure if that is how she got sick. Patient is using her CPAP machine, states it helps her with breathing.     Patient's mom did

## 2020-04-03 ENCOUNTER — TELEPHONE (OUTPATIENT)
Dept: FAMILY MEDICINE CLINIC | Facility: CLINIC | Age: 25
End: 2020-04-03

## 2020-04-03 DIAGNOSIS — J01.00 ACUTE MAXILLARY SINUSITIS, RECURRENCE NOT SPECIFIED: Primary | ICD-10-CM

## 2020-04-03 DIAGNOSIS — R50.9 FEBRILE ILLNESS: ICD-10-CM

## 2020-04-03 PROCEDURE — 99213 OFFICE O/P EST LOW 20 MIN: CPT | Performed by: FAMILY MEDICINE

## 2020-04-03 RX ORDER — PREDNISONE 20 MG/1
40 TABLET ORAL DAILY
Qty: 10 TABLET | Refills: 0 | Status: SHIPPED | OUTPATIENT
Start: 2020-04-03 | End: 2020-04-08

## 2020-04-03 RX ORDER — AMOXICILLIN AND CLAVULANATE POTASSIUM 875; 125 MG/1; MG/1
1 TABLET, FILM COATED ORAL 2 TIMES DAILY
Qty: 20 TABLET | Refills: 0 | Status: SHIPPED | OUTPATIENT
Start: 2020-04-03 | End: 2020-04-13

## 2020-04-03 NOTE — TELEPHONE ENCOUNTER
Virtual/Telephone Check-In    [facility] [employer] on 04/03/20. Patient understands and accepts financial responsibility for any deductible, co-insurance and/or co-pays associated with this service.

## 2020-04-03 NOTE — TELEPHONE ENCOUNTER
Fever on and off 2 weeks nausea, headache, congestion.    Denies being in contact with anyone who was sick     OK'd tele-visit verbalized understanding of billing insurance for tele-visit/ other doctor

## 2020-04-03 NOTE — TELEPHONE ENCOUNTER
Mom said that they had a telehealth visit with Dr Jamie Dahl on 3/23/20. Mom said she was out of work from [employer] at least 1 1/2 weeks and then went back on Wed, they sent her home because she had a low grade temp and nausea.  No diarrhea, but is c/o H/A, luz marina

## 2020-04-06 ENCOUNTER — TELEPHONE (OUTPATIENT)
Dept: FAMILY MEDICINE CLINIC | Facility: CLINIC | Age: 25
End: 2020-04-06

## 2020-04-06 NOTE — TELEPHONE ENCOUNTER
Mom called back and said that patient has been taking the antibiotic and the steroid. Patient said that she is still having a headache and severe nausea. She said the nausea is worse water she takes the AB and steroid, she has been taking them with food.  P

## 2020-04-06 NOTE — TELEPHONE ENCOUNTER
So this is not a fever now. Advil Cold and Sinus. May return to work. Continue medications as ordered.

## 2020-04-06 NOTE — TELEPHONE ENCOUNTER
Mom said that patient has been sick for 3 weeks. She has had 2 telehealth visits one on 3/23/20 and one on 4/3/20. She did try to go back to work last Wed at [employer] in [place] but went home with low grade temp.  Mom said patient is still running temps arou

## 2020-04-06 NOTE — TELEPHONE ENCOUNTER
STILL HAVING LOW GRADE TEMPS, BODY ACHES. PT. WORKS AT [employer]/PT. HAS BEEN OFF WORK 3 WEEKS. MOM HAS CONCERNS.

## 2020-04-06 NOTE — TELEPHONE ENCOUNTER
I left Beatriz Morales a message with Dr. Ramon Su instruction. Told to call back with any further questions.

## 2020-04-06 NOTE — TELEPHONE ENCOUNTER
Mom advised. She said that she was not aware the patient was supposed to be on an antibiotic. Advised Dr Kevin Duke ordered Augmentin on 4/3/20. She will pick it up and have patient start it today.  She also said that Dr Oanh Quiñones had referred patient to a speech t

## 2020-04-07 ENCOUNTER — TELEPHONE (OUTPATIENT)
Dept: FAMILY MEDICINE CLINIC | Facility: CLINIC | Age: 25
End: 2020-04-07

## 2020-04-07 NOTE — TELEPHONE ENCOUNTER
Mom said the [facility] and audiology is no longer doing speech. Order faxed to [facility].

## 2020-04-07 NOTE — TELEPHONE ENCOUNTER
SHE WANTS TO TALK TO AMANDA ABOUT THE SPEECH THERAPY       SHE WANTS TO KNOW IF YOU CAN SEND THE ORDER TO [facility] [address] AND THE FAX NUMBER IS  [phone]

## 2020-04-08 ENCOUNTER — TELEPHONE (OUTPATIENT)
Dept: ENDOCRINOLOGY CLINIC | Facility: CLINIC | Age: 25
End: 2020-04-08

## 2020-04-08 ENCOUNTER — TELEPHONE (OUTPATIENT)
Dept: FAMILY MEDICINE CLINIC | Facility: CLINIC | Age: 25
End: 2020-04-08

## 2020-04-08 NOTE — TELEPHONE ENCOUNTER
Fever 99.1 in past 3 weeks, nausea, for 3 weeks. Needs dr note. No vomiting no diarrhea, no other symptoms, but she says she just doesn't feel good. She wants to be put off from work for longer and wants a dr excuse. ..

## 2020-04-08 NOTE — TELEPHONE ENCOUNTER
STILL HAS A FEVER, HAS BEEN OFF WORK FOR 3 WEEKS, MEDS MAKE HER NAUSEA, WOULD LIKE NURSE AMANDA TO CALL HER

## 2020-04-29 ENCOUNTER — VIRTUAL PHONE E/M (OUTPATIENT)
Dept: FAMILY MEDICINE CLINIC | Facility: CLINIC | Age: 25
End: 2020-04-29
Payer: MEDICAID

## 2020-04-29 DIAGNOSIS — L02.215 PERINEAL ABSCESS: Primary | ICD-10-CM

## 2020-04-29 PROCEDURE — 99214 OFFICE O/P EST MOD 30 MIN: CPT | Performed by: INTERNAL MEDICINE

## 2020-04-29 RX ORDER — CEPHALEXIN 500 MG/1
500 CAPSULE ORAL 3 TIMES DAILY
Qty: 21 CAPSULE | Refills: 0 | Status: SHIPPED | OUTPATIENT
Start: 2020-04-29 | End: 2020-05-06

## 2020-04-29 NOTE — PROGRESS NOTES
Virtual Telephone Check-In     Blaine Ernst verbally consents to a Virtual/Telephone Check-In visit on 04/29/20.     Patient understands and accepts financial responsibility for any deductible, co-insurance and/or co-pays associated with this service. ONETOUCH DELICA LANCETS 95M Does not apply Misc Test twice daily 1 Box 3   • medroxyPROGESTERone Acetate 150 MG/ML Intramuscular Suspension Inject 150 mg into the muscle every 3 (three) months.       • ergocalciferol 70433 units Oral Cap Take 50,000 Units daily for 7 days.         Imaging & Consults:  None     Follow up as needed. The patient indicates understanding of these issues and agrees to the plan.   The patient is asked to return in 1 week to be seen physically in the office, or video if everythi

## 2020-04-29 NOTE — PROGRESS NOTES
Virtual Telephone Check-In    Cecily Santiago verbally consents to a Virtual/Telephone Check-In visit on 04/29/20. Patient understands and accepts financial responsibility for any deductible, co-insurance and/or co-pays associated with this service. LANCETS 33G Does not apply Misc Test twice daily 1 Box 3   • medroxyPROGESTERone Acetate 150 MG/ML Intramuscular Suspension Inject 150 mg into the muscle every 3 (three) months.      • ergocalciferol 12106 units Oral Cap Take 50,000 Units by mouth once a we Consults:  None    Follow up as needed. The patient indicates understanding of these issues and agrees to the plan. The patient is asked to return in 1 week to be seen physically in the office, or video if everything is resolved.              Marty Landers

## 2020-05-04 ENCOUNTER — OFFICE VISIT (OUTPATIENT)
Dept: FAMILY MEDICINE CLINIC | Facility: CLINIC | Age: 25
End: 2020-05-04
Payer: MEDICAID

## 2020-05-04 ENCOUNTER — TELEPHONE (OUTPATIENT)
Dept: ENDOCRINOLOGY CLINIC | Facility: CLINIC | Age: 25
End: 2020-05-04

## 2020-05-04 VITALS
DIASTOLIC BLOOD PRESSURE: 78 MMHG | HEART RATE: 87 BPM | SYSTOLIC BLOOD PRESSURE: 128 MMHG | TEMPERATURE: 98 F | RESPIRATION RATE: 20 BRPM | BODY MASS INDEX: 48 KG/M2 | WEIGHT: 266.81 LBS

## 2020-05-04 DIAGNOSIS — N92.6 IRREGULAR PERIODS: Primary | ICD-10-CM

## 2020-05-04 DIAGNOSIS — E11.9 DIABETES MELLITUS WITHOUT COMPLICATION (HCC): Primary | ICD-10-CM

## 2020-05-04 DIAGNOSIS — E11.9 DIABETES MELLITUS WITHOUT COMPLICATION (HCC): ICD-10-CM

## 2020-05-04 PROCEDURE — 84443 ASSAY THYROID STIM HORMONE: CPT | Performed by: INTERNAL MEDICINE

## 2020-05-04 PROCEDURE — 99214 OFFICE O/P EST MOD 30 MIN: CPT | Performed by: INTERNAL MEDICINE

## 2020-05-04 PROCEDURE — 80061 LIPID PANEL: CPT | Performed by: INTERNAL MEDICINE

## 2020-05-04 PROCEDURE — 85025 COMPLETE CBC W/AUTO DIFF WBC: CPT | Performed by: INTERNAL MEDICINE

## 2020-05-04 PROCEDURE — 83036 HEMOGLOBIN GLYCOSYLATED A1C: CPT | Performed by: INTERNAL MEDICINE

## 2020-05-04 PROCEDURE — 80053 COMPREHEN METABOLIC PANEL: CPT | Performed by: INTERNAL MEDICINE

## 2020-05-04 NOTE — TELEPHONE ENCOUNTER
We can do this in the near future when routine medicine is again suggested, but likely not until June 2020. I have put the orders in.

## 2020-05-04 NOTE — TELEPHONE ENCOUNTER
Date Breakfast   Lunch    Dinner   Bedtime Comments    Pre Insulin Units Post Pre Insulin Units Post Pre  Insulin Units Post     5/4/20 100             4/22 165             4/17 159             4/8 115             4/7    111          4/4 136             4/

## 2020-05-05 NOTE — PROGRESS NOTES
Adalgisa Thompson is a 25year old female. HPI:   Pt has been having a period for weeks, she had PCOS and a DM 2. She does not control her eating or exercise. She reports taking her medications.   She has also been having some intrusive thoughts and famil daily. (Patient taking differently: Take 30 mg by mouth daily.  ) 30 tablet 0      Past Medical History:   Diagnosis Date   • ADD (attention deficit disorder with hyperactivity)    • Depression    • Fatty liver    • Obesity, unspecified    • PCOS (polycyst agrees to the plan.

## 2020-05-06 RX ORDER — ERGOCALCIFEROL 1.25 MG/1
CAPSULE ORAL
Qty: 24 CAPSULE | Refills: 0 | Status: SHIPPED | OUTPATIENT
Start: 2020-05-06

## 2020-05-06 NOTE — TELEPHONE ENCOUNTER
LOV  2020     LAST LAB  2020     LAST RX  ergocalciferol 66430 units Oral Cap () 24 capsule 0refill  3/5/2019    Next OV  Future Appointments   Date Time Provider Ervin Steiner   2020  2:00 PM Fatuma Cheema, KERVIN,LDN,CDE [facility]

## 2020-05-07 ENCOUNTER — TELEPHONE (OUTPATIENT)
Dept: FAMILY MEDICINE CLINIC | Facility: CLINIC | Age: 25
End: 2020-05-07

## 2020-05-07 RX ORDER — FERROUS SULFATE 325(65) MG
325 TABLET ORAL
Qty: 90 TABLET | Refills: 0 | Status: SHIPPED | OUTPATIENT
Start: 2020-05-07 | End: 2020-08-05

## 2020-05-07 NOTE — TELEPHONE ENCOUNTER
See below. Pt's 5/4/20 lab results say \"no anemia, but needs to take iron daily to keep stores high d/t the prolonged bleeding\". Can she just take a multivitamin w/ iron? Or do you want her on Feosol? If so, 325mg qd?

## 2020-05-18 ENCOUNTER — TELEMEDICINE (OUTPATIENT)
Dept: ENDOCRINOLOGY CLINIC | Facility: CLINIC | Age: 25
End: 2020-05-18

## 2020-05-18 DIAGNOSIS — E11.65 TYPE 2 DIABETES MELLITUS WITH HYPERGLYCEMIA, WITHOUT LONG-TERM CURRENT USE OF INSULIN (HCC): Primary | ICD-10-CM

## 2020-05-18 PROCEDURE — G0108 DIAB MANAGE TRN  PER INDIV: HCPCS | Performed by: DIETITIAN, REGISTERED

## 2020-05-18 NOTE — PROGRESS NOTES
Blaine Ernst   9/29/1995 attended Step 1 Diabetic Education:     5/18/2020  Referring Provider: Dr Bay Parnell time: 2:00 End time: 3:00    Due to COVID-19 ACTION PLAN, the patient's office visit was conducted via video.      The patient verbally consen [employer]  FBS's 's. Doesn't test post-meals but will start.     Problem Solving: Prevention,detection and treatment of acute complications: taught symptoms of hypoglycemia, hyperglycemia, how to treat low blood sugar (Rule of 15) and actions for lo

## 2020-06-02 ENCOUNTER — TELEPHONE (OUTPATIENT)
Dept: FAMILY MEDICINE CLINIC | Facility: CLINIC | Age: 25
End: 2020-06-02

## 2020-06-02 NOTE — TELEPHONE ENCOUNTER
She is supposed be getting this injection and I looked too and did not see anything in MAY but we can ask tomorrow. She needs a urine reg if this is true.

## 2020-06-02 NOTE — TELEPHONE ENCOUNTER
Mom said that patient's period lasts for 3 months and then she has spotting every couple days. She had menstrual cramping this morning. Mom thinks she got the Depo Provera shot at her last visit in May.  According to our records the last time patient got th

## 2020-06-03 NOTE — TELEPHONE ENCOUNTER
Patient advised. Mom insists that patient got her Depo shot at her appointment on 5/4/20. She said that Remedios Pearson the diabetic educator called and advised Dr Davis Poon that patient was due for her Depo.  She said she remembers the nurse coming in and giving

## 2020-06-03 NOTE — TELEPHONE ENCOUNTER
Dr Katrin Jay spoke with Mom. She will call back to schedule the Depo, she will need urine pregnancy and Microalbumin as well.

## 2020-06-03 NOTE — TELEPHONE ENCOUNTER
Mom advised. She said that she is sure she  has had a Depo Provera since December. Mom said that she is very confused that she is still bleeding. Mom explained that it is because she did not have the shot.  She insists that a tall blond American [place] looking girl

## 2020-06-03 NOTE — TELEPHONE ENCOUNTER
Patient advised, she did not remember when the last Depo was but thought it might have been December.  She said that she had heavy bleeding and cramping the entire time after she had the Depo shot in December, stopped for a short time and then started bleed

## 2020-06-04 ENCOUNTER — TELEMEDICINE (OUTPATIENT)
Dept: ENDOCRINOLOGY CLINIC | Facility: CLINIC | Age: 25
End: 2020-06-04

## 2020-06-04 DIAGNOSIS — E11.65 TYPE 2 DIABETES MELLITUS WITH HYPERGLYCEMIA, WITHOUT LONG-TERM CURRENT USE OF INSULIN (HCC): Primary | ICD-10-CM

## 2020-06-04 PROCEDURE — 97803 MED NUTRITION INDIV SUBSEQ: CPT | Performed by: DIETITIAN, REGISTERED

## 2020-06-04 NOTE — PROGRESS NOTES
Diabetes Education Follow-up Note    Referring Provider: Dr Estevan Moody   Start time: 9:00  End time: 9:30    Due to COVID-19 ACTION PLAN, the patient's office visit was conducted via video.      The patient verbally consents to an audio-video consultation today

## 2020-06-05 ENCOUNTER — TELEPHONE (OUTPATIENT)
Dept: FAMILY MEDICINE CLINIC | Facility: CLINIC | Age: 25
End: 2020-06-05

## 2020-06-05 DIAGNOSIS — N92.6 IRREGULAR PERIODS: Primary | ICD-10-CM

## 2020-06-05 RX ORDER — MEDROXYPROGESTERONE ACETATE 150 MG/ML
150 INJECTION, SUSPENSION INTRAMUSCULAR
Qty: 1 SYRINGE | Refills: 1 | Status: SHIPPED | OUTPATIENT
Start: 2020-06-05 | End: 2020-06-08

## 2020-06-05 NOTE — TELEPHONE ENCOUNTER
Spoke with mom and scheduled appt for patient. Instructed to bring medication with her at time of appt.  (Patient will need pregnancy test)  Future Appointments   Date Time Provider Ervin Steiner   6/9/2020 11:00 AM [facility] NURSE [facility] [place]

## 2020-06-05 NOTE — TELEPHONE ENCOUNTER
APPT MADE  Future Appointments   Date Time Provider Ervin Steiner   6/9/2020 11:00 AM [facility] N[facility] [place]

## 2020-06-08 ENCOUNTER — TELEPHONE (OUTPATIENT)
Dept: FAMILY MEDICINE CLINIC | Facility: CLINIC | Age: 25
End: 2020-06-08

## 2020-06-08 RX ORDER — MEDROXYPROGESTERONE ACETATE 150 MG/ML
150 INJECTION, SUSPENSION INTRAMUSCULAR
Qty: 1 SYRINGE | Refills: 1 | Status: SHIPPED | OUTPATIENT
Start: 2020-06-08 | End: 2021-03-29

## 2020-06-08 NOTE — TELEPHONE ENCOUNTER
DEPO WAS SENT TO [facility] [place] IN ERROR, [facility] [address] [facility]--PT HAS APPT HERE TOMORROW FOR DEPO SHOT

## 2020-06-09 ENCOUNTER — NURSE ONLY (OUTPATIENT)
Dept: FAMILY MEDICINE CLINIC | Facility: CLINIC | Age: 25
End: 2020-06-09
Payer: MEDICAID

## 2020-06-09 ENCOUNTER — TELEPHONE (OUTPATIENT)
Dept: FAMILY MEDICINE CLINIC | Facility: CLINIC | Age: 25
End: 2020-06-09

## 2020-06-09 DIAGNOSIS — Z30.9 ENCOUNTER FOR CONTRACEPTIVE MANAGEMENT, UNSPECIFIED TYPE: Primary | ICD-10-CM

## 2020-06-09 PROCEDURE — 96372 THER/PROPH/DIAG INJ SC/IM: CPT | Performed by: INTERNAL MEDICINE

## 2020-06-09 PROCEDURE — 81025 URINE PREGNANCY TEST: CPT | Performed by: INTERNAL MEDICINE

## 2020-06-09 RX ORDER — MEDROXYPROGESTERONE ACETATE 150 MG/ML
150 INJECTION, SUSPENSION INTRAMUSCULAR ONCE
Status: COMPLETED | OUTPATIENT
Start: 2020-06-09 | End: 2020-06-09

## 2020-06-09 RX ADMIN — MEDROXYPROGESTERONE ACETATE 150 MG: 150 INJECTION, SUSPENSION INTRAMUSCULAR at 10:45:00

## 2020-06-09 NOTE — TELEPHONE ENCOUNTER
PT WAS IN OFFICE TODAY FOR DEPO AND PREGNANCY TEST (NEGATIVE)    MOTHER WANTS TO KNOW AFTER INJECTION TODAY HOW LONG SHE SHE EXPECT HER PERIOD TO LAST    WHAT IF IT LASTS LONGER THAT 3 WEEKS TO A MONTH?   RIGHT NOW PT IS EXPERIENCING DAILY PASSING OF CLOTS

## 2020-06-17 ENCOUNTER — TELEPHONE (OUTPATIENT)
Dept: ENDOCRINOLOGY CLINIC | Facility: CLINIC | Age: 25
End: 2020-06-17

## 2020-06-19 ENCOUNTER — TELEPHONE (OUTPATIENT)
Dept: FAMILY MEDICINE CLINIC | Facility: CLINIC | Age: 25
End: 2020-06-19

## 2020-06-19 NOTE — TELEPHONE ENCOUNTER
Mom wants to know how long after the shot should they expect her period to start? How do they manage the Depo shot? Does the MD office contact her every time she is due for the shot or are they suppose to keep track?

## 2020-06-19 NOTE — TELEPHONE ENCOUNTER
Carmelo suspends period, but without increasing her risk of endometrial cancer, she may have periodic bleeding, but not predictably. She gets an injection every 12-13 weeks they should get the next appt every time they get a shot and asif it on the calendar.

## 2020-06-23 NOTE — TELEPHONE ENCOUNTER
Pt. contacted & explained that she isn't eligible for a personal Poornima to be covered under her insurance.

## 2020-08-01 ENCOUNTER — TELEPHONE (OUTPATIENT)
Dept: ENDOCRINOLOGY CLINIC | Facility: CLINIC | Age: 25
End: 2020-08-01

## 2020-08-01 NOTE — TELEPHONE ENCOUNTER
Diab Educ: pt/mom did not show for Step 2 class today. Emailed her to call and resched and to attend Step 3 on Sat 8/29.

## 2020-08-06 NOTE — TELEPHONE ENCOUNTER
ASSESSMENT/ PLAN:   1. Generalized anxiety disorder    2. Essential hypertension      Continue current medication regimen.  Continue regular exercise.  Discussed other nonpharmacologic measures to help.  Questions answered.  No orders of the defined types were placed in this encounter.    See Patient Instruction section  No follow-ups on file.    ___________________________________________________  SUBJECTIVE:  Richelle is a 62 year old female who is seen for evaluation of her generalized anxiety disorder.  Hypertension.  Reflux disease.  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  This was very stressful for her.  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  She has started walking regularly which does help her anxiety.  Alprazolam 1 mg t.i.d. p.r.n. helpful.  No excess sedation.  Hypertension-no chest pains tightness or heaviness.  GERD-heartburn controlled with pantoprazole.  Food does not stick.  No pain with swallowing.  No melena or hematochezia    Patient Active Problem List   Diagnosis   • Essential hypertension   • Generalized anxiety disorder   • Gastroesophageal reflux disease without esophagitis       REVIEW OF SYSTEMS:  In addition to that noted above, review of systems is remarkable for:  As above.  Occasional a.m. nausea from stress.    pantoprazole (PROTONIX) 40 MG tablet  ALPRAZolam (XANAX) 1 MG tablet  lisinopril (ZESTRIL) 10 MG tablet  methylcellulose powder for oral suspension  docusate sodium (COLACE) 100 MG capsule  fexofenadine (ALLEGRA) 60 MG tablet  cholecalciferol (VITAMIN D3) 1000 UNITS tablet    No current facility-administered medications on file prior to visit.     Allergies as of 07/06/2020 - Reviewed 07/06/2020   Allergen Reaction Noted   • Aspirin Other (See Comments) and GI UPSET 07/10/2012   • Droperidol SEIZURES 04/04/2014   • Miconazole RASH 07/09/2017   •  Mom advised. She said she only takes the medication once every 2 months until she gets her period, she used to take it every 4 months until her period came then she took it for 1-2 weeks.  She is concerned because she started this in September and has not g Molds & smuts Other (See Comments) 05/15/2013   • Neosporin [neomycin-bacitracin-polymyxin] RASH 04/04/2014   • Penicillins RASH 04/04/2014   • Seasonal Other (See Comments) 05/15/2013       OBJECTIVE:  Visit Vitals  /70 (BP Location: LUE - Left upper extremity, Patient Position: Sitting, Cuff Size: Regular)   Pulse 88   Temp 97.9 °F (36.6 °C) (Temporal)   Resp 20   Ht 5' 2.5\" (1.588 m)   Wt 58.7 kg   BMI 23.30 kg/m²     General:   In no acute distress. Awake, appropriate.  Anxious.  Slight increased psychomotor activity.  Neck is supple.  No adenopathy or thyromegaly.  Lungs clear to auscultation, unlabored.  Heart regular rate rhythm.  No ectopy or murmur.  Abdomen soft nontender.  No epigastric tenderness.  Extremities without edema.

## 2020-08-29 ENCOUNTER — NURSE ONLY (OUTPATIENT)
Dept: ENDOCRINOLOGY CLINIC | Facility: CLINIC | Age: 25
End: 2020-08-29
Payer: MEDICAID

## 2020-08-29 DIAGNOSIS — E11.65 TYPE 2 DIABETES MELLITUS WITH HYPERGLYCEMIA, WITHOUT LONG-TERM CURRENT USE OF INSULIN (HCC): Primary | ICD-10-CM

## 2020-08-29 PROCEDURE — 99211 OFF/OP EST MAY X REQ PHY/QHP: CPT | Performed by: DIETITIAN, REGISTERED

## 2020-08-29 NOTE — PROGRESS NOTES
Zoey MARTELL[birth year] attended Step 3 Class: Carbohydrate Counting, Medications, Treating Highs/Lows    Date: 8/29/2020  Referring Provider: Dr. Kianna Wyatt  Start time: 9:00 End time: 11:00    The patient participated during the class: Pt was able to a

## 2020-09-09 ENCOUNTER — NURSE ONLY (OUTPATIENT)
Dept: FAMILY MEDICINE CLINIC | Facility: CLINIC | Age: 25
End: 2020-09-09
Payer: MEDICAID

## 2020-09-09 DIAGNOSIS — Z30.9 ENCOUNTER FOR CONTRACEPTIVE MANAGEMENT, UNSPECIFIED TYPE: Primary | ICD-10-CM

## 2020-09-09 LAB — CONTROL LINE PRESENT WITH A CLEAR BACKGROUND (YES/NO): YES YES/NO

## 2020-09-09 PROCEDURE — 96372 THER/PROPH/DIAG INJ SC/IM: CPT | Performed by: INTERNAL MEDICINE

## 2020-09-09 PROCEDURE — 81025 URINE PREGNANCY TEST: CPT | Performed by: INTERNAL MEDICINE

## 2020-09-09 RX ORDER — MEDROXYPROGESTERONE ACETATE 150 MG/ML
150 INJECTION, SUSPENSION INTRAMUSCULAR ONCE
Status: COMPLETED | OUTPATIENT
Start: 2020-09-09 | End: 2020-09-09

## 2020-09-09 RX ADMIN — MEDROXYPROGESTERONE ACETATE 150 MG: 150 INJECTION, SUSPENSION INTRAMUSCULAR at 09:37:00

## 2020-09-12 ENCOUNTER — NURSE ONLY (OUTPATIENT)
Dept: ENDOCRINOLOGY CLINIC | Facility: CLINIC | Age: 25
End: 2020-09-12
Payer: MEDICAID

## 2020-09-12 DIAGNOSIS — E11.65 TYPE 2 DIABETES MELLITUS WITH HYPERGLYCEMIA, WITHOUT LONG-TERM CURRENT USE OF INSULIN (HCC): Primary | ICD-10-CM

## 2020-09-12 PROCEDURE — 99211 OFF/OP EST MAY X REQ PHY/QHP: CPT | Performed by: DIETITIAN, REGISTERED

## 2020-09-12 NOTE — PROGRESS NOTES
Jazmyne Lew  [id number] attended Step 4 Class: Complications, Special Occasion Eating  Date: 9/12/2020  Referring Provider: Dr Osvaldo Arredondo  Start time: 9:00 End time: 10:30    The patient participated during the class: Patient was able to identify ways to

## 2020-09-25 ENCOUNTER — APPOINTMENT (OUTPATIENT)
Dept: LAB | Age: 25
End: 2020-09-25
Attending: FAMILY MEDICINE
Payer: MEDICAID

## 2020-09-25 ENCOUNTER — TELEMEDICINE (OUTPATIENT)
Dept: FAMILY MEDICINE CLINIC | Facility: CLINIC | Age: 25
End: 2020-09-25
Payer: MEDICAID

## 2020-09-25 ENCOUNTER — TELEPHONE (OUTPATIENT)
Dept: FAMILY MEDICINE CLINIC | Facility: CLINIC | Age: 25
End: 2020-09-25

## 2020-09-25 DIAGNOSIS — R51.9 ACUTE NONINTRACTABLE HEADACHE, UNSPECIFIED HEADACHE TYPE: ICD-10-CM

## 2020-09-25 DIAGNOSIS — R19.5 LOOSE STOOLS: ICD-10-CM

## 2020-09-25 DIAGNOSIS — R52 BODY ACHES: ICD-10-CM

## 2020-09-25 DIAGNOSIS — Z20.822 SUSPECTED COVID-19 VIRUS INFECTION: ICD-10-CM

## 2020-09-25 DIAGNOSIS — Z20.822 SUSPECTED COVID-19 VIRUS INFECTION: Primary | ICD-10-CM

## 2020-09-25 PROCEDURE — 99214 OFFICE O/P EST MOD 30 MIN: CPT | Performed by: FAMILY MEDICINE

## 2020-09-25 NOTE — TELEPHONE ENCOUNTER
Virtual/Telephone Check-In    Adalgisa Thompson verbally {consents to a [employer] on 09/25/20. Patient has been referred to the [facility] website at [email or web address] to review the yearly Consent to Treat document.   Patient under

## 2020-09-25 NOTE — PROGRESS NOTES
Virtual/Telephone Check-In    Andrzej Amaya  verbally consents to a [employer] on 9/25/2020 . Patient understands and accepts financial responsibility for any deductible, co-insurance and/or co-pays associated with this service. symptomatic she may return that date, if testing is positive for COVID-19, she cannot return and we needs to contact us about that.       Allergies:    Pineapple                   Comment:Itchy Throat    Current Outpatient Medications   Medication Sig Dispe standard drinks      Types: 1 Glasses of wine per week      Comment: rare- sangria    Drug use: No        REVIEW OF SYSTEMS:   GENERAL: feels well otherwise  SKIN: no rashes  EYES:denies blurred vision or double vision  HEENT: no complaints of issues  LUNG this visit as no physical exam could be performed. Every conscious effort was taken to allow for sufficient and adequate time. This billing was spent on reviewing labs, medications, radiology tests and decision making.   Appropriate medical decision-matyin

## 2020-09-28 LAB — SARS-COV-2 RNA RESP QL NAA+PROBE: NOT DETECTED

## 2020-10-10 ENCOUNTER — NURSE ONLY (OUTPATIENT)
Dept: ENDOCRINOLOGY CLINIC | Facility: CLINIC | Age: 25
End: 2020-10-10
Payer: MEDICAID

## 2020-10-10 DIAGNOSIS — E11.65 TYPE 2 DIABETES MELLITUS WITH HYPERGLYCEMIA, WITHOUT LONG-TERM CURRENT USE OF INSULIN (HCC): Primary | ICD-10-CM

## 2020-10-10 PROCEDURE — 99211 OFF/OP EST MAY X REQ PHY/QHP: CPT | Performed by: DIETITIAN, REGISTERED

## 2020-10-12 NOTE — PROGRESS NOTES
Adalgisa Thompson  EEI[birth year] attended Step 2 Class: Pathophysiology of Diabetes, Types of Diabetes, Sources of Carbohydrate, Exercise, Blood Glucose Targets     Date: 10/12/2020  Referring Provider: Dr Charla Stearns time: 9:00 End time: 11:00    The patie

## 2020-11-23 ENCOUNTER — TELEPHONE (OUTPATIENT)
Dept: FAMILY MEDICINE CLINIC | Facility: CLINIC | Age: 25
End: 2020-11-23

## 2020-11-23 ENCOUNTER — TELEMEDICINE (OUTPATIENT)
Dept: FAMILY MEDICINE CLINIC | Facility: CLINIC | Age: 25
End: 2020-11-23
Payer: MEDICAID

## 2020-11-23 VITALS — BODY MASS INDEX: 47 KG/M2 | WEIGHT: 262.75 LBS | TEMPERATURE: 98 F

## 2020-11-23 DIAGNOSIS — F32.A DEPRESSION, UNSPECIFIED DEPRESSION TYPE: Primary | ICD-10-CM

## 2020-11-23 PROCEDURE — 99213 OFFICE O/P EST LOW 20 MIN: CPT | Performed by: INTERNAL MEDICINE

## 2020-11-23 RX ORDER — BUPROPION HYDROCHLORIDE 150 MG/1
150 TABLET ORAL EVERY MORNING
Qty: 90 TABLET | Refills: 1 | Status: SHIPPED | OUTPATIENT
Start: 2020-11-23 | End: 2021-02-21

## 2020-11-23 NOTE — TELEPHONE ENCOUNTER
Virtual/Telephone Check-In    Cecily Santiago verbally consents to a [employer] on 11/23/2020. Patient has been referred to the [facility] website at [email or web address] to review the yearly Consent to Treat document.   Patient unde

## 2020-11-23 NOTE — PROGRESS NOTES
Navya Landers is a 22year old female. HPI:   Pt has been unmotivated and sad, not suicidal.  She feels robotic. She is enjoying the [employer] and another [employer] series. She is working, but feels if she does not get her breaks on time and ignored. History:  Social History    Tobacco Use      Smoking status: Never Smoker      Smokeless tobacco: Never Used    Alcohol use: Not Currently      Alcohol/week: 1.0 standard drinks      Types: 1 Glasses of wine per week      Comment: rare- sangria    Drug use

## 2020-12-02 ENCOUNTER — NURSE ONLY (OUTPATIENT)
Dept: FAMILY MEDICINE CLINIC | Facility: CLINIC | Age: 25
End: 2020-12-02
Payer: MEDICAID

## 2020-12-02 DIAGNOSIS — N92.6 IRREGULAR PERIODS: Primary | ICD-10-CM

## 2020-12-02 DIAGNOSIS — N92.1 MENOMETRORRHAGIA: ICD-10-CM

## 2020-12-02 PROCEDURE — 96372 THER/PROPH/DIAG INJ SC/IM: CPT | Performed by: INTERNAL MEDICINE

## 2020-12-02 RX ORDER — MEDROXYPROGESTERONE ACETATE 150 MG/ML
150 INJECTION, SUSPENSION INTRAMUSCULAR ONCE
Status: COMPLETED | OUTPATIENT
Start: 2020-12-02 | End: 2020-12-02

## 2020-12-02 RX ADMIN — MEDROXYPROGESTERONE ACETATE 150 MG: 150 INJECTION, SUSPENSION INTRAMUSCULAR at 15:05:00

## 2020-12-05 ENCOUNTER — NURSE ONLY (OUTPATIENT)
Dept: ENDOCRINOLOGY CLINIC | Facility: CLINIC | Age: 25
End: 2020-12-05
Payer: MEDICAID

## 2020-12-05 ENCOUNTER — TELEPHONE (OUTPATIENT)
Dept: ENDOCRINOLOGY CLINIC | Facility: CLINIC | Age: 25
End: 2020-12-05

## 2020-12-05 DIAGNOSIS — E11.9 DIABETES MELLITUS WITHOUT COMPLICATION (HCC): Primary | ICD-10-CM

## 2020-12-05 PROCEDURE — 99211 OFF/OP EST MAY X REQ PHY/QHP: CPT | Performed by: DIETITIAN, REGISTERED

## 2020-12-05 NOTE — TELEPHONE ENCOUNTER
Pt. attended virtual diabetes Step 6 class & talked afterwards about her worsening depression & lack of  Interest in self care.  She expressed she is not suicidal. However,  she said her current psychiatrist is leaving the practice & she feels uncomfortable

## 2020-12-05 NOTE — TELEPHONE ENCOUNTER
Dr. Christina Mackay,  Would you like to place referral to Usha Cruz? Please see Stephy Hendricks's telepone note.

## 2020-12-07 NOTE — PROGRESS NOTES
Yoav Garcia  [facility][birth year] attended Step 6 Class:  3 Month Follow-Up:     Date:    Referring Provider:Dr. Bear Moreno Start time:9am  End time:10am    Due to COVID-19 ACTION PLAN, the patient's office visit was conducted via real-time interactive audio an [place] on [facility]ness ([facility]ELIA) – (depression, bipolar and other support)                    [phone], www.nelia. org      [] Depression & Bipolar Support [place] – 009---[phone] – www. [employer]. org      [] Anxiety & Depression Association of A []  Other  Patient verbalized understanding and has no further questions at this time. Written materials provided for all areas covered.     Pradip Walden RN, CDE

## 2020-12-12 ENCOUNTER — TELEPHONE (OUTPATIENT)
Dept: FAMILY MEDICINE CLINIC | Facility: CLINIC | Age: 25
End: 2020-12-12

## 2020-12-12 NOTE — TELEPHONE ENCOUNTER
D-amphetamine 30 mg salt combo, [facility] [address] [facility] [place]. Pt has a med check in nov.

## 2020-12-15 RX ORDER — DEXTROAMPHETAMINE SACCHARATE, AMPHETAMINE ASPARTATE MONOHYDRATE, DEXTROAMPHETAMINE SULFATE AND AMPHETAMINE SULFATE 7.5; 7.5; 7.5; 7.5 MG/1; MG/1; MG/1; MG/1
30 CAPSULE, EXTENDED RELEASE ORAL EVERY MORNING
Qty: 30 CAPSULE | Refills: 0 | Status: SHIPPED | OUTPATIENT
Start: 2020-12-15 | End: 2021-01-19

## 2020-12-15 NOTE — TELEPHONE ENCOUNTER
Patient said that she has not had this filled since last December because she was in a deep depression. She said that the depression has gotten better since she restarted the Bupropion. She said she is having difficulty with concentration.

## 2020-12-15 NOTE — TELEPHONE ENCOUNTER
Amphetamine-Dextroamphet ER 30 MG Oral Capsule SR 24 Hr please call into [facility] on [address] in [place]

## 2020-12-21 ENCOUNTER — NURSE ONLY (OUTPATIENT)
Dept: ENDOCRINOLOGY CLINIC | Facility: CLINIC | Age: 25
End: 2020-12-21
Payer: MEDICAID

## 2020-12-21 DIAGNOSIS — E11.65 TYPE 2 DIABETES MELLITUS WITH HYPERGLYCEMIA, WITHOUT LONG-TERM CURRENT USE OF INSULIN (HCC): Primary | ICD-10-CM

## 2020-12-21 PROCEDURE — 99211 OFF/OP EST MAY X REQ PHY/QHP: CPT | Performed by: DIETITIAN, REGISTERED

## 2020-12-23 VITALS — WEIGHT: 258.5 LBS | BODY MASS INDEX: 47 KG/M2

## 2020-12-23 NOTE — PROGRESS NOTES
Jamilarlene Junior  : 1995 was seen for Diabetic Education Follow up:    Date: 2020  Referring Provider: Dr. Kiersten Shepard  Start time: 2:30pm End time: 3:30pm  Pt. Accompanied by mother to visit  Assessment:     Assessment: Wt 258 lb 8 oz   BMI 46. kidneys,nerves,teeth,foot care, skin,  Foot Care Guidelines    HEALTHY COPING:  - Identifies barriers:depression   - has family/social involvement:she lives w/her mother   - identifies benefits of behavior change:she realized she needs to continue taking m

## 2020-12-28 ENCOUNTER — LAB ENCOUNTER (OUTPATIENT)
Dept: LAB | Age: 25
End: 2020-12-28
Attending: INTERNAL MEDICINE
Payer: MEDICAID

## 2020-12-28 ENCOUNTER — OFFICE VISIT (OUTPATIENT)
Dept: FAMILY MEDICINE CLINIC | Facility: CLINIC | Age: 25
End: 2020-12-28
Payer: MEDICAID

## 2020-12-28 VITALS
BODY MASS INDEX: 48.03 KG/M2 | DIASTOLIC BLOOD PRESSURE: 70 MMHG | HEART RATE: 98 BPM | RESPIRATION RATE: 16 BRPM | WEIGHT: 261 LBS | HEIGHT: 62 IN | OXYGEN SATURATION: 98 % | SYSTOLIC BLOOD PRESSURE: 130 MMHG | TEMPERATURE: 97 F

## 2020-12-28 DIAGNOSIS — E11.9 DIABETES MELLITUS WITHOUT COMPLICATION (HCC): ICD-10-CM

## 2020-12-28 DIAGNOSIS — N92.6 IRREGULAR PERIODS: Primary | ICD-10-CM

## 2020-12-28 DIAGNOSIS — F32.A DEPRESSION, UNSPECIFIED DEPRESSION TYPE: ICD-10-CM

## 2020-12-28 DIAGNOSIS — E66.01 CLASS 2 SEVERE OBESITY DUE TO EXCESS CALORIES WITH SERIOUS COMORBIDITY AND BODY MASS INDEX (BMI) OF 36.0 TO 36.9 IN ADULT (HCC): ICD-10-CM

## 2020-12-28 DIAGNOSIS — G47.33 OSA ON CPAP: ICD-10-CM

## 2020-12-28 DIAGNOSIS — Z99.89 OSA ON CPAP: ICD-10-CM

## 2020-12-28 DIAGNOSIS — E28.2 PCO (POLYCYSTIC OVARIES): ICD-10-CM

## 2020-12-28 PROCEDURE — 3008F BODY MASS INDEX DOCD: CPT | Performed by: INTERNAL MEDICINE

## 2020-12-28 PROCEDURE — 99214 OFFICE O/P EST MOD 30 MIN: CPT | Performed by: INTERNAL MEDICINE

## 2020-12-28 PROCEDURE — 36415 COLL VENOUS BLD VENIPUNCTURE: CPT

## 2020-12-28 PROCEDURE — 82043 UR ALBUMIN QUANTITATIVE: CPT

## 2020-12-28 PROCEDURE — 83036 HEMOGLOBIN GLYCOSYLATED A1C: CPT

## 2020-12-28 PROCEDURE — 3078F DIAST BP <80 MM HG: CPT | Performed by: INTERNAL MEDICINE

## 2020-12-28 PROCEDURE — 82570 ASSAY OF URINE CREATININE: CPT

## 2020-12-28 PROCEDURE — 3075F SYST BP GE 130 - 139MM HG: CPT | Performed by: INTERNAL MEDICINE

## 2020-12-28 RX ORDER — ARIPIPRAZOLE 5 MG/1
5 TABLET ORAL DAILY
Qty: 30 TABLET | Refills: 3 | Status: SHIPPED | OUTPATIENT
Start: 2020-12-28 | End: 2021-01-27

## 2020-12-28 RX ORDER — CLOBETASOL PROPIONATE 0.5 MG/G
1 OINTMENT TOPICAL 2 TIMES DAILY
Qty: 60 G | Refills: 3 | Status: SHIPPED | OUTPATIENT
Start: 2020-12-28 | End: 2021-01-27

## 2020-12-28 NOTE — PROGRESS NOTES
Ebony Dixon is a 22year old female. HPI:   Pt has been having mood issues and poor regulation of her self care for years. She has tried many different things to help herself, but always very distracted with intrusive thoughts.   She has seborrhoic d status: Never Smoker      Smokeless tobacco: Never Used    Alcohol use: Not Currently      Alcohol/week: 1.0 standard drinks      Types: 1 Glasses of wine per week      Comment: rare- sangria    Drug use: No       REVIEW OF SYSTEMS:   GENERAL HEALTH: feels

## 2020-12-30 DIAGNOSIS — E11.9 DIABETES MELLITUS WITHOUT COMPLICATION (HCC): Primary | ICD-10-CM

## 2021-01-19 RX ORDER — DEXTROAMPHETAMINE SACCHARATE, AMPHETAMINE ASPARTATE MONOHYDRATE, DEXTROAMPHETAMINE SULFATE AND AMPHETAMINE SULFATE 7.5; 7.5; 7.5; 7.5 MG/1; MG/1; MG/1; MG/1
30 CAPSULE, EXTENDED RELEASE ORAL EVERY MORNING
Qty: 30 CAPSULE | Refills: 0 | Status: SHIPPED | OUTPATIENT
Start: 2021-01-19

## 2021-02-15 ENCOUNTER — TELEPHONE (OUTPATIENT)
Dept: FAMILY MEDICINE CLINIC | Facility: CLINIC | Age: 26
End: 2021-02-15

## 2021-02-15 NOTE — TELEPHONE ENCOUNTER
Reported her sumatriptan now working when I saw her out in public, please call in follow up to discuss may need psych meds filled as well.

## 2021-02-17 RX ORDER — FENOFIBRATE 67 MG/1
CAPSULE ORAL
Qty: 90 CAPSULE | Refills: 3 | Status: SHIPPED | OUTPATIENT
Start: 2021-02-17

## 2021-02-17 NOTE — TELEPHONE ENCOUNTER
Last office visit: 12/28/20  Last refill: 10/29/19  Labs Due: 6/30/21  Future Appointments   Date Time Provider Ervin Obdulia   3/8/2021 12:30 PM Cathy Glover RN, CDE [facility]        Fenofibrate 67 MG Oral Cap         Sig: TAKE 1

## 2021-02-23 ENCOUNTER — TELEPHONE (OUTPATIENT)
Dept: FAMILY MEDICINE CLINIC | Facility: CLINIC | Age: 26
End: 2021-02-23

## 2021-02-23 RX ORDER — BUTALBITAL, ACETAMINOPHEN AND CAFFEINE 50; 325; 40 MG/1; MG/1; MG/1
1 TABLET ORAL EVERY 4 HOURS PRN
Qty: 30 TABLET | Refills: 0 | Status: SHIPPED | OUTPATIENT
Start: 2021-02-23 | End: 2021-03-25

## 2021-02-23 NOTE — TELEPHONE ENCOUNTER
She can try Fioricet, but very sparing, like the headache she had when she missed work, not for daily use.   I received paperwork, it was mostly about physical limitation which I think she has none, but her mental health which are in my mind are more restri

## 2021-02-23 NOTE — TELEPHONE ENCOUNTER
Patient states that she had a migraine a couple weeks ago and she had to leave work. She said she took a Sumatriptan and  it made her migraine \"worse\" and she felt \"hot and nauseated\".  She took an Excedrin Migraine but wants to know if there is somethi

## 2021-03-08 ENCOUNTER — MED REC SCAN ONLY (OUTPATIENT)
Dept: FAMILY MEDICINE CLINIC | Facility: CLINIC | Age: 26
End: 2021-03-08

## 2021-03-08 NOTE — PROGRESS NOTES
2nd request    Pt stopped by CHRIS [place]le[place] to sign a release of information.  Pt requesting all records from [facility] to be sent to an .  [address]  [place], [address] [facility]    Fax [phone]

## 2021-03-08 NOTE — PROGRESS NOTES
HPI/Subjective:   Patient ID: Amy Montano is a 22year old female.     HPI    History/Other:   Review of Systems  Current Outpatient Medications   Medication Sig Dispense Refill   • Butalbital-APAP-Caffeine -40 MG Oral Tab Take 1 tablet by mouth

## 2021-03-09 ENCOUNTER — TELEPHONE (OUTPATIENT)
Dept: FAMILY MEDICINE CLINIC | Facility: CLINIC | Age: 26
End: 2021-03-09

## 2021-03-09 NOTE — TELEPHONE ENCOUNTER
Mom advised adverse reactions from vaccine, not infectious. Advised ok to work. She will have patient call if she needs a note for work.  MARGO. Dr Nicole Holden RN

## 2021-03-09 NOTE — TELEPHONE ENCOUNTER
Mom said that patient got the 2nd Pfizer vaccine about 5 days ago. She has had a low grade temp of 99, nauseated for about 5 days and has a migraine today. She has also had joint pain. Mom thinks she had Covid last February but there were no tests.  She has

## 2021-03-09 NOTE — TELEPHONE ENCOUNTER
Anabela Schaefer needs a return to work note saying it's ok to work. Can this note be emailed to randy?

## 2021-03-09 NOTE — TELEPHONE ENCOUNTER
2ND COVID SHOT 3/2/2021, SHE HAS BEEN SICK SINCE SHE GOT THE SHOT, SHE IS WONDERING WHAT IS GOING ON

## 2021-03-15 ENCOUNTER — TELEPHONE (OUTPATIENT)
Dept: FAMILY MEDICINE CLINIC | Facility: CLINIC | Age: 26
End: 2021-03-15

## 2021-03-15 NOTE — TELEPHONE ENCOUNTER
MED REC REQ RECEIVED 2/17/2021 FROM [employer] [facility] [employer]. SENT TO SCAN STAT 3/15/2021. DOS REQ: ENTIRE PT RECORD.

## 2021-03-22 ENCOUNTER — OFFICE VISIT (OUTPATIENT)
Dept: FAMILY MEDICINE CLINIC | Facility: CLINIC | Age: 26
End: 2021-03-22
Payer: MEDICAID

## 2021-03-22 ENCOUNTER — NURSE ONLY (OUTPATIENT)
Dept: ENDOCRINOLOGY CLINIC | Facility: CLINIC | Age: 26
End: 2021-03-22
Payer: MEDICAID

## 2021-03-22 VITALS
SYSTOLIC BLOOD PRESSURE: 130 MMHG | TEMPERATURE: 98 F | RESPIRATION RATE: 16 BRPM | HEIGHT: 62.5 IN | HEART RATE: 89 BPM | BODY MASS INDEX: 48.98 KG/M2 | DIASTOLIC BLOOD PRESSURE: 71 MMHG | OXYGEN SATURATION: 97 % | WEIGHT: 273 LBS

## 2021-03-22 DIAGNOSIS — E11.9 DIABETES MELLITUS WITHOUT COMPLICATION (HCC): Primary | ICD-10-CM

## 2021-03-22 DIAGNOSIS — H60.333 ACUTE SWIMMER'S EAR OF BOTH SIDES: Primary | ICD-10-CM

## 2021-03-22 DIAGNOSIS — F32.A DEPRESSION, UNSPECIFIED DEPRESSION TYPE: ICD-10-CM

## 2021-03-22 PROCEDURE — 3008F BODY MASS INDEX DOCD: CPT | Performed by: PHYSICIAN ASSISTANT

## 2021-03-22 PROCEDURE — 99214 OFFICE O/P EST MOD 30 MIN: CPT | Performed by: PHYSICIAN ASSISTANT

## 2021-03-22 PROCEDURE — 3075F SYST BP GE 130 - 139MM HG: CPT | Performed by: PHYSICIAN ASSISTANT

## 2021-03-22 PROCEDURE — 3078F DIAST BP <80 MM HG: CPT | Performed by: PHYSICIAN ASSISTANT

## 2021-03-22 PROCEDURE — 99211 OFF/OP EST MAY X REQ PHY/QHP: CPT | Performed by: DIETITIAN, REGISTERED

## 2021-03-22 RX ORDER — ARIPIPRAZOLE 5 MG/1
2.5 TABLET ORAL DAILY
COMMUNITY

## 2021-03-22 RX ORDER — OFLOXACIN 3 MG/ML
10 SOLUTION AURICULAR (OTIC) DAILY
Qty: 10 ML | Refills: 0 | Status: SHIPPED | OUTPATIENT
Start: 2021-03-22 | End: 2021-03-29

## 2021-03-22 NOTE — PATIENT INSTRUCTIONS
-ofloxacin to ears  -Mupirocin to the out side of ears  -Must be seen immediately with worsening symptoms- worsening pain, redness, swelling, fevers, chills, sweats, mastoid pain, or any other worsening symptoms.   External Ear Infection (Adult)    External ear canal. You can get these drops over the counter at most drugstores. They work by removing water from the ear canal.    Follow-up care  Follow up with your healthcare provider in 1 week, or as advised.    When to seek medical advice  Call your healthcare

## 2021-03-22 NOTE — PATIENT INSTRUCTIONS
1. Pt. was instructed to check blood sugars twice daily at varied times  2. Continue Metformin (called  to see if their medication was compromised; said no )  3.  Follow-up on 3/29/21 @2:30pm

## 2021-03-22 NOTE — PROGRESS NOTES
CHIEF COMPLAINT:   Patient presents with:  Ear Pain    HPI:   Adalgisa Thompson is a 22year old female who presents for BL ear itching and burning x 2 weeks. Ears are also crusty BL.   Patient reports when she cleans her ears, the Qtip is wet with a foul o TABLET(15 MG) BY MOUTH DAILY 30 tablet 0   • escitalopram 20 MG Oral Tab Take 1 tablet (20 mg total) by mouth daily.  (Patient taking differently: Take 30 mg by mouth daily.  ) 30 tablet 0   • Butalbital-APAP-Caffeine -40 MG Oral Tab Take 1 tablet by fluid, bony landmarks intact. EACs with crusting and white discharge BL. Ear canals are still patent. No mastoid tenderness. Right auricle with yellow crusting and small scabbed wound. No redness to auricle or ears.       NOSE: Nostrils patent, no nasal Meds & Refills for this Visit:  Requested Prescriptions     Signed Prescriptions Disp Refills   • ofloxacin 0.3 % Otic Solution 10 mL 0     Sig: Place 10 drops into both ears daily for 7 days.  Lay on opposite side of affected ear for 5-10- minutes disease or ever had a stomach ulcer or digestive tract bleeding. · Don't allow water to get into your ear when bathing. Also don't swim until the infection has cleared. Prevention  · Keep your ears dry. This helps lower the risk of infection.  Dry your

## 2021-03-23 NOTE — PROGRESS NOTES
Claudia Pantoja : 1995 was seen for Diabetic Education Follow up:    Date: 3/22/21 Referring Provider: Dr. Suzanne Strauss  Start time: 1:30pm End time: 2:00pm     Assessment:     Assessment: LMP 2020   Diagnosis: Type 2 DM    Reason for visit: Janeen Beverage

## 2021-03-25 ENCOUNTER — MED REC SCAN ONLY (OUTPATIENT)
Dept: FAMILY MEDICINE CLINIC | Facility: CLINIC | Age: 26
End: 2021-03-25

## 2021-03-29 ENCOUNTER — NURSE ONLY (OUTPATIENT)
Dept: ENDOCRINOLOGY CLINIC | Facility: CLINIC | Age: 26
End: 2021-03-29
Payer: MEDICAID

## 2021-03-29 ENCOUNTER — TELEPHONE (OUTPATIENT)
Dept: FAMILY MEDICINE CLINIC | Facility: CLINIC | Age: 26
End: 2021-03-29

## 2021-03-29 DIAGNOSIS — E11.65 TYPE 2 DIABETES MELLITUS WITH HYPERGLYCEMIA, WITHOUT LONG-TERM CURRENT USE OF INSULIN (HCC): Primary | ICD-10-CM

## 2021-03-29 PROCEDURE — 99211 OFF/OP EST MAY X REQ PHY/QHP: CPT | Performed by: DIETITIAN, REGISTERED

## 2021-03-29 NOTE — TELEPHONE ENCOUNTER
Mom said that patient is \"quite depressed\". She said that patient drove herself to [place] Sat last week. She said that her psychiatrist \"left\". She said that they are looking for a new psychiatrist and someone to manage her medications.  She said that

## 2021-03-29 NOTE — PROGRESS NOTES
Ad Castro  : 1995 was seen for Diabetic Education Follow up:  Pt.  Accompanied by mother to visit    Date: 3/29/2021  Referring Provider: Dr. Ap Oconnor  Start time: 2:30pm End time: 3:30pm    Assessment:     Assessment: LMP 2020      Hgb

## 2021-03-30 RX ORDER — MEDROXYPROGESTERONE ACETATE 150 MG/ML
150 INJECTION, SUSPENSION INTRAMUSCULAR
Qty: 1 SYRINGE | Refills: 0 | Status: SHIPPED | OUTPATIENT
Start: 2021-03-30 | End: 2021-06-22

## 2021-04-03 ENCOUNTER — TELEPHONE (OUTPATIENT)
Dept: FAMILY MEDICINE CLINIC | Facility: CLINIC | Age: 26
End: 2021-04-03

## 2021-04-03 NOTE — TELEPHONE ENCOUNTER
Call mom about her coming to see mia. Pt had a double ear inf, still on antibiotic, has a sore throat but also she needs to go over [employer] with mia. Can she see mia?

## 2021-04-03 NOTE — TELEPHONE ENCOUNTER
Talked with patient and states she was diagnosed with an ear infection one week ago and has finished antibiotic and is feeling better. Also had recent evaluation at [facility] and they recommend a follow up with PCP. Patient also needs Depo Injection.  Soraya

## 2021-04-06 ENCOUNTER — OFFICE VISIT (OUTPATIENT)
Dept: FAMILY MEDICINE CLINIC | Facility: CLINIC | Age: 26
End: 2021-04-06
Payer: MEDICAID

## 2021-04-06 ENCOUNTER — LAB ENCOUNTER (OUTPATIENT)
Dept: LAB | Age: 26
End: 2021-04-06
Attending: INTERNAL MEDICINE
Payer: MEDICAID

## 2021-04-06 ENCOUNTER — MED REC SCAN ONLY (OUTPATIENT)
Dept: FAMILY MEDICINE CLINIC | Facility: CLINIC | Age: 26
End: 2021-04-06

## 2021-04-06 VITALS — BODY MASS INDEX: 49 KG/M2 | WEIGHT: 262 LBS | BODY MASS INDEX: 48 KG/M2 | WEIGHT: 266 LBS

## 2021-04-06 VITALS
HEART RATE: 87 BPM | WEIGHT: 275 LBS | TEMPERATURE: 97 F | HEIGHT: 62 IN | SYSTOLIC BLOOD PRESSURE: 128 MMHG | OXYGEN SATURATION: 97 % | RESPIRATION RATE: 18 BRPM | DIASTOLIC BLOOD PRESSURE: 76 MMHG | BODY MASS INDEX: 50.61 KG/M2

## 2021-04-06 DIAGNOSIS — G47.33 OSA ON CPAP: ICD-10-CM

## 2021-04-06 DIAGNOSIS — F32.A DEPRESSION, UNSPECIFIED DEPRESSION TYPE: ICD-10-CM

## 2021-04-06 DIAGNOSIS — Z30.9 ENCOUNTER FOR CONTRACEPTIVE MANAGEMENT, UNSPECIFIED TYPE: Primary | ICD-10-CM

## 2021-04-06 DIAGNOSIS — E11.9 DIABETES MELLITUS WITHOUT COMPLICATION (HCC): ICD-10-CM

## 2021-04-06 DIAGNOSIS — Z99.89 OSA ON CPAP: ICD-10-CM

## 2021-04-06 PROCEDURE — 83036 HEMOGLOBIN GLYCOSYLATED A1C: CPT

## 2021-04-06 PROCEDURE — 85025 COMPLETE CBC W/AUTO DIFF WBC: CPT

## 2021-04-06 PROCEDURE — 3074F SYST BP LT 130 MM HG: CPT | Performed by: INTERNAL MEDICINE

## 2021-04-06 PROCEDURE — 3078F DIAST BP <80 MM HG: CPT | Performed by: INTERNAL MEDICINE

## 2021-04-06 PROCEDURE — 81025 URINE PREGNANCY TEST: CPT | Performed by: INTERNAL MEDICINE

## 2021-04-06 PROCEDURE — 80061 LIPID PANEL: CPT

## 2021-04-06 PROCEDURE — 3008F BODY MASS INDEX DOCD: CPT | Performed by: INTERNAL MEDICINE

## 2021-04-06 PROCEDURE — 96127 BRIEF EMOTIONAL/BEHAV ASSMT: CPT | Performed by: INTERNAL MEDICINE

## 2021-04-06 PROCEDURE — 96372 THER/PROPH/DIAG INJ SC/IM: CPT | Performed by: INTERNAL MEDICINE

## 2021-04-06 PROCEDURE — 3044F HG A1C LEVEL LT 7.0%: CPT | Performed by: PHYSICIAN ASSISTANT

## 2021-04-06 PROCEDURE — 99214 OFFICE O/P EST MOD 30 MIN: CPT | Performed by: INTERNAL MEDICINE

## 2021-04-06 PROCEDURE — 80053 COMPREHEN METABOLIC PANEL: CPT

## 2021-04-06 PROCEDURE — 36415 COLL VENOUS BLD VENIPUNCTURE: CPT

## 2021-04-06 RX ORDER — MEDROXYPROGESTERONE ACETATE 150 MG/ML
150 INJECTION, SUSPENSION INTRAMUSCULAR ONCE
Status: COMPLETED | OUTPATIENT
Start: 2021-04-06 | End: 2021-04-06

## 2021-04-06 RX ADMIN — MEDROXYPROGESTERONE ACETATE 150 MG: 150 INJECTION, SUSPENSION INTRAMUSCULAR at 10:44:00

## 2021-04-06 NOTE — PROGRESS NOTES
Carlota Garza is a 22year old female. HPI:   Pt has been having some increased depression.   She had an evaluation with [facility] and not felt to be suicidal.  She has been in counseling in he past and needs to reconnect with mental health and counse wine per week      Comment: rare- sangria    Drug use: No       REVIEW OF SYSTEMS:   GENERAL HEALTH: feels well otherwise  SKIN: denies any unusual skin lesions or rashes  RESPIRATORY: denies shortness of breath with exertion  CARDIOVASCULAR: denies chest

## 2021-04-08 ENCOUNTER — TELEPHONE (OUTPATIENT)
Dept: FAMILY MEDICINE CLINIC | Facility: CLINIC | Age: 26
End: 2021-04-08

## 2021-05-04 RX ORDER — METFORMIN HYDROCHLORIDE 500 MG/1
TABLET, EXTENDED RELEASE ORAL
Qty: 180 TABLET | Refills: 3 | Status: SHIPPED | OUTPATIENT
Start: 2021-05-04

## 2021-05-04 NOTE — TELEPHONE ENCOUNTER
Last refill: 02/10/20  Qty: 180  W/ 3 refills  Last ov: 04/06/21    Requested Prescriptions     Pending Prescriptions Disp Refills   • METFORMIN HCL  MG Oral Tablet 24 Hr [Pharmacy Med Name: METFORMIN ER 500MG 24HR TABS] 180 tablet 3     Sig: TAKE 2

## 2021-06-15 ENCOUNTER — PATIENT OUTREACH (OUTPATIENT)
Dept: FAMILY MEDICINE CLINIC | Facility: CLINIC | Age: 26
End: 2021-06-15

## 2021-06-15 NOTE — PROGRESS NOTES
left message to help schedule sleep follow up appointment, last seen 01-23-20, mentioned ok to do a phone visit

## 2021-06-18 ENCOUNTER — HOSPITAL ENCOUNTER (OUTPATIENT)
Age: 26
Discharge: HOME | End: 2021-06-18
Payer: MEDICAID

## 2021-06-18 ENCOUNTER — APPOINTMENT (OUTPATIENT)
Dept: GENERAL RADIOLOGY | Age: 26
End: 2021-06-18
Attending: NURSE PRACTITIONER
Payer: MEDICAID

## 2021-06-18 VITALS
DIASTOLIC BLOOD PRESSURE: 72 MMHG | RESPIRATION RATE: 16 BRPM | SYSTOLIC BLOOD PRESSURE: 136 MMHG | TEMPERATURE: 98 F | OXYGEN SATURATION: 98 % | HEART RATE: 71 BPM

## 2021-06-18 DIAGNOSIS — M25.569 KNEE PAIN: Primary | ICD-10-CM

## 2021-06-18 PROCEDURE — 99213 OFFICE O/P EST LOW 20 MIN: CPT | Performed by: NURSE PRACTITIONER

## 2021-06-18 PROCEDURE — 73560 X-RAY EXAM OF KNEE 1 OR 2: CPT | Performed by: NURSE PRACTITIONER

## 2021-06-18 NOTE — ED INITIAL ASSESSMENT (HPI)
Pt sts 5 nights ago tried to avoid a fall over her cat and felt her left knee \"shift\" when she put all her weight on the left leg. Painful to weight bear and with certain movements.

## 2021-06-19 NOTE — ED PROVIDER NOTES
Patient Seen in: [facility]      History   Patient presents with:  Leg or Foot Injury    Stated Complaint: fall-L knee pain    HPI/Subjective:   41-year-old female presents to the IC with complaints of left knee pain x5 days.   Patient states s Physical Exam    General appearance: alert, appears stated age and cooperative  Neck: no adenopathy and supple, symmetrical, trachea midline  Lungs: clear to auscultation bilaterally  Heart: S1, S2 normal, no murmur, click, rub or gallop, regular r differential diagnosis, treatment plan, warning signs and symptoms which should prompt immediate return. The patient and/or family expressed clear understanding of these instructions and agrees to the following plan provided.   The patient and/or family wa

## 2021-06-22 ENCOUNTER — TELEPHONE (OUTPATIENT)
Dept: FAMILY MEDICINE CLINIC | Facility: CLINIC | Age: 26
End: 2021-06-22

## 2021-06-22 RX ORDER — MEDROXYPROGESTERONE ACETATE 150 MG/ML
INJECTION, SUSPENSION INTRAMUSCULAR
Qty: 1 ML | Refills: 0 | Status: SHIPPED | OUTPATIENT
Start: 2021-06-22 | End: 2021-09-21

## 2021-06-22 NOTE — TELEPHONE ENCOUNTER
ANDRE IS WONDERING IF SHE NEEDS CANCEL HER NURSE APPT FOR TOMORROW FOR HER DEPO? THE PHARMACY TOLD HER IT WILL TAKE 48 HOURS TO GET THE DEPO IN.

## 2021-06-23 ENCOUNTER — NURSE ONLY (OUTPATIENT)
Dept: FAMILY MEDICINE CLINIC | Facility: CLINIC | Age: 26
End: 2021-06-23
Payer: MEDICAID

## 2021-06-23 DIAGNOSIS — Z30.9 ENCOUNTER FOR CONTRACEPTIVE MANAGEMENT, UNSPECIFIED TYPE: Primary | ICD-10-CM

## 2021-06-23 PROCEDURE — 96372 THER/PROPH/DIAG INJ SC/IM: CPT | Performed by: INTERNAL MEDICINE

## 2021-06-23 RX ORDER — MEDROXYPROGESTERONE ACETATE 150 MG/ML
150 INJECTION, SUSPENSION INTRAMUSCULAR ONCE
Status: COMPLETED | OUTPATIENT
Start: 2021-06-23 | End: 2021-06-23

## 2021-06-23 RX ADMIN — MEDROXYPROGESTERONE ACETATE 150 MG: 150 INJECTION, SUSPENSION INTRAMUSCULAR at 16:09:00

## 2021-06-23 NOTE — PROGRESS NOTES
Depo Provera given IM to patient in standing position. Patient tolerated well. Advised to return for next injection between September 8th and September 22nd.

## 2021-06-24 ENCOUNTER — TELEPHONE (OUTPATIENT)
Dept: FAMILY MEDICINE CLINIC | Facility: CLINIC | Age: 26
End: 2021-06-24

## 2021-06-24 NOTE — TELEPHONE ENCOUNTER
Called and spoke with her mother/POA re: her no show appt. And advised her of the 40.00 charge. Mom said pt. forgot the appt. and she will call us back to reschedule appt.

## 2021-07-07 ENCOUNTER — PATIENT OUTREACH (OUTPATIENT)
Dept: FAMILY MEDICINE CLINIC | Facility: CLINIC | Age: 26
End: 2021-07-07

## 2021-08-09 RX ORDER — BUTALBITAL, ACETAMINOPHEN AND CAFFEINE 50; 325; 40 MG/1; MG/1; MG/1
1 TABLET ORAL EVERY 4 HOURS PRN
Qty: 30 TABLET | Refills: 1 | OUTPATIENT
Start: 2021-08-09 | End: 2021-09-08

## 2021-08-09 NOTE — TELEPHONE ENCOUNTER
Last refill: 2/23/21 #30 w/ 0 refills    Last OV: 4/6/21  Last labs: 4/6/21    No future appointments.         Pt is asking for additional refills along with a new prescription

## 2021-08-20 ENCOUNTER — TELEPHONE (OUTPATIENT)
Dept: FAMILY MEDICINE CLINIC | Facility: CLINIC | Age: 26
End: 2021-08-20

## 2021-08-20 ENCOUNTER — OFFICE VISIT (OUTPATIENT)
Dept: FAMILY MEDICINE CLINIC | Facility: CLINIC | Age: 26
End: 2021-08-20
Payer: MEDICAID

## 2021-08-20 VITALS
SYSTOLIC BLOOD PRESSURE: 130 MMHG | HEIGHT: 62.5 IN | DIASTOLIC BLOOD PRESSURE: 88 MMHG | RESPIRATION RATE: 16 BRPM | BODY MASS INDEX: 50.42 KG/M2 | WEIGHT: 281 LBS | OXYGEN SATURATION: 99 % | HEART RATE: 92 BPM | TEMPERATURE: 98 F

## 2021-08-20 DIAGNOSIS — M79.672 FOOT PAIN, LEFT: Primary | ICD-10-CM

## 2021-08-20 PROCEDURE — 3075F SYST BP GE 130 - 139MM HG: CPT | Performed by: PHYSICIAN ASSISTANT

## 2021-08-20 PROCEDURE — 3079F DIAST BP 80-89 MM HG: CPT | Performed by: PHYSICIAN ASSISTANT

## 2021-08-20 PROCEDURE — 99213 OFFICE O/P EST LOW 20 MIN: CPT | Performed by: PHYSICIAN ASSISTANT

## 2021-08-20 PROCEDURE — 3008F BODY MASS INDEX DOCD: CPT | Performed by: PHYSICIAN ASSISTANT

## 2021-08-20 NOTE — PROGRESS NOTES
CHIEF COMPLAINT:     Patient presents with: Foot Pain: right      HPI:   Navya Landers is a 22year old female who presents with concern about left foot. She woke up last night with sharp pain in the left foot.  She is diabetic and was concerned because Smokeless tobacco: Never Used    Vaping Use      Vaping Use: Never used    Alcohol use: Not Currently      Alcohol/week: 1.0 standard drinks      Types: 1 Glasses of wine per week      Comment: rare- sangria    Drug use: No       REVIEW OF SYSTEMS:   [age] year old female who presents with Foot Pain (right). Symptoms are consistent with:      ASSESSMENT:  Foot pain, left  (primary encounter diagnosis)      Etiology of pain she had experienced is unclear. She has no pain now and a normal exam.  Steady gait.

## 2021-08-20 NOTE — TELEPHONE ENCOUNTER
Spoke with patient. Patient states that she woke up at 2am to go to the bathroom and had pain on the bottom of her left foot. The pain is in the middle of the foot. No redness or swelling. No known trauma. Advised that patient should be seen.   No appo

## 2021-09-21 NOTE — TELEPHONE ENCOUNTER
Last oV: 4/6/21  Last labs: 4/6/21    Future Appointments   Date Time Provider Ervin Steiner   9/22/2021  2:00 PM [facility] NURSE [facility] [place]

## 2021-09-22 ENCOUNTER — NURSE ONLY (OUTPATIENT)
Dept: FAMILY MEDICINE CLINIC | Facility: CLINIC | Age: 26
End: 2021-09-22
Payer: MEDICAID

## 2021-09-22 DIAGNOSIS — Z30.42 ENCOUNTER FOR SURVEILLANCE OF INJECTABLE CONTRACEPTIVE: Primary | ICD-10-CM

## 2021-09-22 PROCEDURE — 96372 THER/PROPH/DIAG INJ SC/IM: CPT | Performed by: INTERNAL MEDICINE

## 2021-09-22 RX ORDER — MEDROXYPROGESTERONE ACETATE 150 MG/ML
150 INJECTION, SUSPENSION INTRAMUSCULAR
Qty: 1 ML | Refills: 0 | Status: SHIPPED | OUTPATIENT
Start: 2021-09-22

## 2021-09-22 RX ORDER — MEDROXYPROGESTERONE ACETATE 150 MG/ML
150 INJECTION, SUSPENSION INTRAMUSCULAR ONCE
Status: COMPLETED | OUTPATIENT
Start: 2021-09-22 | End: 2021-09-22

## 2021-09-22 RX ADMIN — MEDROXYPROGESTERONE ACETATE 150 MG: 150 INJECTION, SUSPENSION INTRAMUSCULAR at 14:51:00

## 2021-09-22 NOTE — PROGRESS NOTES
Depo Provera administered IM to RVG with patient standing. Patient tolerated well. Given dates that she is next due between 12/8 and 12/22.

## 2021-10-29 RX ORDER — DEXTROAMPHETAMINE SACCHARATE, AMPHETAMINE ASPARTATE MONOHYDRATE, DEXTROAMPHETAMINE SULFATE AND AMPHETAMINE SULFATE 7.5; 7.5; 7.5; 7.5 MG/1; MG/1; MG/1; MG/1
30 CAPSULE, EXTENDED RELEASE ORAL EVERY MORNING
Qty: 30 CAPSULE | Refills: 0 | OUTPATIENT
Start: 2021-10-29

## 2021-10-29 NOTE — TELEPHONE ENCOUNTER
Requested Prescriptions     Pending Prescriptions Disp Refills   • amphetamine-dextroamphetamine 30 MG Oral Capsule SR 24 Hr 30 capsule 0     Sig: Take 1 capsule (30 mg total) by mouth every morning.      Last refill on 1/19/2021  Last office visit pertaini

## 2022-02-11 ENCOUNTER — TELEPHONE (OUTPATIENT)
Dept: FAMILY MEDICINE CLINIC | Facility: CLINIC | Age: 27
End: 2022-02-11

## 2022-02-11 NOTE — TELEPHONE ENCOUNTER
Spoke with mom. Mom is POA for patient. Patient was diagnosed with COVID about one month ago. She continues to have low grade temperatures max temp to 101 degrees. Mom states that patient had 4 days of diarrhea and decreased appetite and is lethargic. She is concerned and would like [employer] evaluated. Appointment schedule for Monday in the sick clinic. Mom notified that if symptoms worsen, she should be evaluated in the UC.       Future Appointments   Date Time Provider Ervin Steiner   2/14/2022  1:40 PM BEATRIS Cano [facility] [place]

## 2022-03-11 ENCOUNTER — HOSPITAL ENCOUNTER (OUTPATIENT)
Age: 27
Discharge: HOME | End: 2022-03-11
Payer: MEDICAID

## 2022-03-11 VITALS
OXYGEN SATURATION: 99 % | BODY MASS INDEX: 48.76 KG/M2 | WEIGHT: 265 LBS | SYSTOLIC BLOOD PRESSURE: 149 MMHG | HEART RATE: 87 BPM | TEMPERATURE: 97 F | HEIGHT: 62 IN | RESPIRATION RATE: 18 BRPM | DIASTOLIC BLOOD PRESSURE: 90 MMHG

## 2022-03-11 DIAGNOSIS — L03.012 PARONYCHIA OF LEFT INDEX FINGER: Primary | ICD-10-CM

## 2022-03-11 PROCEDURE — 99213 OFFICE O/P EST LOW 20 MIN: CPT | Performed by: NURSE PRACTITIONER

## 2022-03-11 PROCEDURE — 10060 I&D ABSCESS SIMPLE/SINGLE: CPT | Performed by: NURSE PRACTITIONER

## 2022-03-11 RX ORDER — SULFAMETHOXAZOLE AND TRIMETHOPRIM 800; 160 MG/1; MG/1
1 TABLET ORAL 2 TIMES DAILY
Qty: 14 TABLET | Refills: 0 | Status: SHIPPED | OUTPATIENT
Start: 2022-03-11 | End: 2022-03-18

## 2022-03-11 RX ORDER — BUTALBITAL/ACETAMINOPHEN 50MG-325MG
TABLET ORAL
COMMUNITY
Start: 2021-02-21

## 2022-04-13 ENCOUNTER — TELEPHONE (OUTPATIENT)
Dept: FAMILY MEDICINE CLINIC | Facility: CLINIC | Age: 27
End: 2022-04-13

## 2022-04-18 ENCOUNTER — TELEPHONE (OUTPATIENT)
Dept: FAMILY MEDICINE CLINIC | Facility: CLINIC | Age: 27
End: 2022-04-18

## 2022-04-18 NOTE — TELEPHONE ENCOUNTER
Patient advised she needs to be evaluated.  Appointment scheduled with Jessica Altamirano NP Wed 4/20/21 at 440pm.

## 2022-04-18 NOTE — TELEPHONE ENCOUNTER
Please see if she could come earlier. Needs pelvic exam (and is due for a pap). Would recommend not doing this on her period. She is also due for repeat labs. Can do those when she is here too. Thanks!

## 2022-04-18 NOTE — TELEPHONE ENCOUNTER
Pt states she needs to talk to the nurse about incontinence. Pt states she is 26 and wearing adults diapers. Pt states this has been going on for a month or two. Pt has not seen Dr. Linda Cho for this. Please call pt back.

## 2022-05-04 ENCOUNTER — OFFICE VISIT (OUTPATIENT)
Dept: FAMILY MEDICINE CLINIC | Facility: CLINIC | Age: 27
End: 2022-05-04
Payer: MEDICAID

## 2022-05-04 VITALS
HEART RATE: 97 BPM | SYSTOLIC BLOOD PRESSURE: 138 MMHG | RESPIRATION RATE: 18 BRPM | OXYGEN SATURATION: 98 % | HEIGHT: 62 IN | DIASTOLIC BLOOD PRESSURE: 86 MMHG | WEIGHT: 252.5 LBS | TEMPERATURE: 97 F | BODY MASS INDEX: 46.46 KG/M2

## 2022-05-04 DIAGNOSIS — E66.01 CLASS 2 SEVERE OBESITY DUE TO EXCESS CALORIES WITH SERIOUS COMORBIDITY AND BODY MASS INDEX (BMI) OF 36.0 TO 36.9 IN ADULT (HCC): ICD-10-CM

## 2022-05-04 DIAGNOSIS — E11.9 DIABETES MELLITUS WITHOUT COMPLICATION (HCC): Primary | ICD-10-CM

## 2022-05-04 DIAGNOSIS — Z99.89 OSA ON CPAP: ICD-10-CM

## 2022-05-04 DIAGNOSIS — R32 URINARY INCONTINENCE, UNSPECIFIED TYPE: ICD-10-CM

## 2022-05-04 DIAGNOSIS — G47.33 OSA ON CPAP: ICD-10-CM

## 2022-05-04 DIAGNOSIS — R80.9 MICROALBUMINURIA: ICD-10-CM

## 2022-05-04 DIAGNOSIS — N92.1 MENOMETRORRHAGIA: ICD-10-CM

## 2022-05-04 DIAGNOSIS — N92.6 IRREGULAR PERIODS: ICD-10-CM

## 2022-05-04 DIAGNOSIS — Z00.00 WELL ADULT EXAM: ICD-10-CM

## 2022-05-04 LAB
ALBUMIN SERPL-MCNC: 4 G/DL (ref 3.4–5)
ALBUMIN/GLOB SERPL: 1 {RATIO} (ref 1–2)
ALP LIVER SERPL-CCNC: 167 U/L
ALT SERPL-CCNC: 131 U/L
ANION GAP SERPL CALC-SCNC: 5 MMOL/L (ref 0–18)
AST SERPL-CCNC: 66 U/L (ref 15–37)
BASOPHILS # BLD AUTO: 0.06 X10(3) UL (ref 0–0.2)
BASOPHILS NFR BLD AUTO: 0.4 %
BILIRUB SERPL-MCNC: 0.5 MG/DL (ref 0.1–2)
BUN BLD-MCNC: 11 MG/DL (ref 7–18)
CALCIUM BLD-MCNC: 10.1 MG/DL (ref 8.5–10.1)
CHLORIDE SERPL-SCNC: 101 MMOL/L (ref 98–112)
CHOLEST SERPL-MCNC: 252 MG/DL (ref ?–200)
CO2 SERPL-SCNC: 28 MMOL/L (ref 21–32)
CONTROL LINE PRESENT WITH A CLEAR BACKGROUND (YES/NO): YES YES/NO
CREAT BLD-MCNC: 0.88 MG/DL
CREAT UR-SCNC: 21.6 MG/DL
EOSINOPHIL # BLD AUTO: 0.14 X10(3) UL (ref 0–0.7)
EOSINOPHIL NFR BLD AUTO: 1 %
ERYTHROCYTE [DISTWIDTH] IN BLOOD BY AUTOMATED COUNT: 12.2 %
EST. AVERAGE GLUCOSE BLD GHB EST-MCNC: 306 MG/DL (ref 68–126)
FASTING PATIENT LIPID ANSWER: NO
FASTING STATUS PATIENT QL REPORTED: NO
GLOBULIN PLAS-MCNC: 4.2 G/DL (ref 2.8–4.4)
GLUCOSE BLD-MCNC: 580 MG/DL (ref 70–99)
HBA1C MFR BLD: 12.3 % (ref ?–5.7)
HCT VFR BLD AUTO: 46.5 %
HDLC SERPL-MCNC: 33 MG/DL (ref 40–59)
HGB BLD-MCNC: 15.1 G/DL
IMM GRANULOCYTES # BLD AUTO: 0.09 X10(3) UL (ref 0–1)
IMM GRANULOCYTES NFR BLD: 0.7 %
LDLC SERPL CALC-MCNC: 116 MG/DL (ref ?–100)
LYMPHOCYTES # BLD AUTO: 2.64 X10(3) UL (ref 1–4)
LYMPHOCYTES NFR BLD AUTO: 19.3 %
MCH RBC QN AUTO: 26.8 PG (ref 26–34)
MCHC RBC AUTO-ENTMCNC: 32.5 G/DL (ref 31–37)
MCV RBC AUTO: 82.4 FL
MICROALBUMIN UR-MCNC: 4.49 MG/DL
MICROALBUMIN/CREAT 24H UR-RTO: 207.9 UG/MG (ref ?–30)
MONOCYTES # BLD AUTO: 0.72 X10(3) UL (ref 0.1–1)
MONOCYTES NFR BLD AUTO: 5.3 %
NEUTROPHILS # BLD AUTO: 10.04 X10 (3) UL (ref 1.5–7.7)
NEUTROPHILS # BLD AUTO: 10.04 X10(3) UL (ref 1.5–7.7)
NEUTROPHILS NFR BLD AUTO: 73.3 %
NONHDLC SERPL-MCNC: 219 MG/DL (ref ?–130)
OSMOLALITY SERPL CALC.SUM OF ELEC: 304 MOSM/KG (ref 275–295)
PLATELET # BLD AUTO: 417 10(3)UL (ref 150–450)
POTASSIUM SERPL-SCNC: 4.5 MMOL/L (ref 3.5–5.1)
PROT SERPL-MCNC: 8.2 G/DL (ref 6.4–8.2)
RBC # BLD AUTO: 5.64 X10(6)UL
SODIUM SERPL-SCNC: 134 MMOL/L (ref 136–145)
TRIGL SERPL-MCNC: 580 MG/DL (ref 30–149)
VLDLC SERPL CALC-MCNC: 106 MG/DL (ref 0–30)
WBC # BLD AUTO: 13.7 X10(3) UL (ref 4–11)

## 2022-05-04 PROCEDURE — 3075F SYST BP GE 130 - 139MM HG: CPT | Performed by: INTERNAL MEDICINE

## 2022-05-04 PROCEDURE — 85025 COMPLETE CBC W/AUTO DIFF WBC: CPT | Performed by: INTERNAL MEDICINE

## 2022-05-04 PROCEDURE — 82043 UR ALBUMIN QUANTITATIVE: CPT | Performed by: INTERNAL MEDICINE

## 2022-05-04 PROCEDURE — 3008F BODY MASS INDEX DOCD: CPT | Performed by: INTERNAL MEDICINE

## 2022-05-04 PROCEDURE — 3079F DIAST BP 80-89 MM HG: CPT | Performed by: INTERNAL MEDICINE

## 2022-05-04 PROCEDURE — 88175 CYTOPATH C/V AUTO FLUID REDO: CPT | Performed by: INTERNAL MEDICINE

## 2022-05-04 PROCEDURE — 81025 URINE PREGNANCY TEST: CPT | Performed by: INTERNAL MEDICINE

## 2022-05-04 PROCEDURE — 87491 CHLMYD TRACH DNA AMP PROBE: CPT | Performed by: INTERNAL MEDICINE

## 2022-05-04 PROCEDURE — 80061 LIPID PANEL: CPT | Performed by: INTERNAL MEDICINE

## 2022-05-04 PROCEDURE — 87591 N.GONORRHOEAE DNA AMP PROB: CPT | Performed by: INTERNAL MEDICINE

## 2022-05-04 PROCEDURE — 96372 THER/PROPH/DIAG INJ SC/IM: CPT | Performed by: INTERNAL MEDICINE

## 2022-05-04 PROCEDURE — 82570 ASSAY OF URINE CREATININE: CPT | Performed by: INTERNAL MEDICINE

## 2022-05-04 PROCEDURE — 99395 PREV VISIT EST AGE 18-39: CPT | Performed by: INTERNAL MEDICINE

## 2022-05-04 PROCEDURE — 83036 HEMOGLOBIN GLYCOSYLATED A1C: CPT | Performed by: INTERNAL MEDICINE

## 2022-05-04 PROCEDURE — 80053 COMPREHEN METABOLIC PANEL: CPT | Performed by: INTERNAL MEDICINE

## 2022-05-04 PROCEDURE — 87086 URINE CULTURE/COLONY COUNT: CPT | Performed by: INTERNAL MEDICINE

## 2022-05-04 RX ORDER — MEDROXYPROGESTERONE ACETATE 150 MG/ML
INJECTION, SUSPENSION INTRAMUSCULAR
Qty: 1 ML | Refills: 0 | Status: SHIPPED | OUTPATIENT
Start: 2022-05-04

## 2022-05-04 RX ORDER — MEDROXYPROGESTERONE ACETATE 150 MG/ML
150 INJECTION, SUSPENSION INTRAMUSCULAR ONCE
Status: COMPLETED | OUTPATIENT
Start: 2022-05-04 | End: 2022-05-04

## 2022-05-04 RX ORDER — METFORMIN HYDROCHLORIDE 500 MG/1
1000 TABLET, EXTENDED RELEASE ORAL
Qty: 180 TABLET | Refills: 3 | Status: SHIPPED | OUTPATIENT
Start: 2022-05-04 | End: 2022-08-02

## 2022-05-04 RX ADMIN — MEDROXYPROGESTERONE ACETATE 150 MG: 150 INJECTION, SUSPENSION INTRAMUSCULAR at 15:45:00

## 2022-05-04 NOTE — ADDENDUM NOTE
Addended by: Payton Jose on: 5/4/2022 03:39 PM     Modules accepted: Orders SUPERVISING PHYSICIAN:  Jh Avalos M.D.



DISCHARGE DIAGNOSIS: 

1.   Osteoarthritis of bilateral knees status post right total knee arthroplasty
, postoperative

      day #3. 

2.   Coronary artery disease.



REASON FOR HOSPITALIZATION:  Mr. Ocasio is a 65 year-old  male patient 
with a history of bilateral arthritis of the knees that has progressively 
worsened.  He had multiple injections and other conservative measures 
treatments in the outpatient setting but had failed to receive significant 
improvement.  He requested an elective total knee arthroplasty for symptomatic 
relief.  He was admitted for elective total right knee arthroplasty on 04/25/18 
and was followed in the immediate postoperative state.  He was found in stable 
condition.



LABORATORY:  Postoperative H&H was 13.2 and 38.6.   BMP showed normal 
electrolytes with BUN 13, creatinine 0.8, glucose 138, calcium 9.2.  Urinalysis 
showed to be within normal limits.  



HOSPITAL COURSE:  Mr. Ocasio as admitted as noted above on 04/25/18 for 
elective total right knee arthroplasty that was performed by Dr. Jese Perez.  
Please refer to his operative note for full details.  The patient progressed 
well through his physical therapy efforts and on 04/28/18 was felt clinically 
well enough to continue in the outpatient setting with physical therapy 
rehabilitation.



PLAN:  Mr. Ocasio was discharged on 04/28/18 to have continued physical therapy 
and rehabilitation through [facility].  He was to resume his usual diet as 
tolerated, increase his activity as per Physical Therapy.  He was to have wound 
management as per Dr. Perez's postoperative management.  He was encouraged to 
return to the hospital or call Dr. Perez should any concerning symptoms.  
Medications at discharge included:



1.   Tramadol 50 to 100 mg every 6 hours as needed for pain.

2.   Flexeril 10 mg every 8 hours, #15.

3.   Xarelto 10 mg daily, #8.



Condition on discharge was stable and improved.



#[id number]
[facility]

## 2022-05-05 LAB
C TRACH DNA SPEC QL NAA+PROBE: NEGATIVE
N GONORRHOEA DNA SPEC QL NAA+PROBE: NEGATIVE

## 2022-06-02 ENCOUNTER — OFFICE VISIT (OUTPATIENT)
Dept: FAMILY MEDICINE CLINIC | Facility: CLINIC | Age: 27
End: 2022-06-02
Payer: MEDICAID

## 2022-06-02 VITALS
DIASTOLIC BLOOD PRESSURE: 88 MMHG | RESPIRATION RATE: 18 BRPM | TEMPERATURE: 97 F | SYSTOLIC BLOOD PRESSURE: 136 MMHG | HEIGHT: 62 IN | WEIGHT: 256.13 LBS | HEART RATE: 101 BPM | OXYGEN SATURATION: 98 % | BODY MASS INDEX: 47.13 KG/M2

## 2022-06-02 DIAGNOSIS — S69.91XD FINGER INJURY, RIGHT, SUBSEQUENT ENCOUNTER: Primary | ICD-10-CM

## 2022-06-02 PROCEDURE — 3075F SYST BP GE 130 - 139MM HG: CPT | Performed by: INTERNAL MEDICINE

## 2022-06-02 PROCEDURE — 99214 OFFICE O/P EST MOD 30 MIN: CPT | Performed by: INTERNAL MEDICINE

## 2022-06-02 PROCEDURE — 3008F BODY MASS INDEX DOCD: CPT | Performed by: INTERNAL MEDICINE

## 2022-06-02 PROCEDURE — 3079F DIAST BP 80-89 MM HG: CPT | Performed by: INTERNAL MEDICINE

## 2022-06-14 ENCOUNTER — TELEPHONE (OUTPATIENT)
Dept: FAMILY MEDICINE CLINIC | Facility: CLINIC | Age: 27
End: 2022-06-14

## 2022-08-09 RX ORDER — GLIMEPIRIDE 2 MG/1
2 TABLET ORAL
Qty: 90 TABLET | Refills: 0 | Status: SHIPPED | OUTPATIENT
Start: 2022-08-09 | End: 2022-11-07

## 2022-08-15 ENCOUNTER — TELEPHONE (OUTPATIENT)
Dept: FAMILY MEDICINE CLINIC | Facility: CLINIC | Age: 27
End: 2022-08-15

## 2022-08-15 NOTE — TELEPHONE ENCOUNTER
No! She needs follow up with me now for repeat labs. The most important way to prevent long term problems is control of her diabetes.    Her last A1C was 12.3%

## 2022-08-15 NOTE — TELEPHONE ENCOUNTER
HAS BEEN WORKING WITH HER ON HER FATTY LIVER, MOM FOUND SOMETHING CALLED [employer] WHICH HAS PRE & PROBIOTICS, IT IS A SUPPLEMENT, WOULD LIKE  GRANTS OPINION ON THIS? CALL MOM

## 2022-08-22 RX ORDER — MEDROXYPROGESTERONE ACETATE 150 MG/ML
150 INJECTION, SUSPENSION INTRAMUSCULAR
Qty: 1 ML | Refills: 0 | Status: SHIPPED | OUTPATIENT
Start: 2022-08-22

## 2022-08-22 NOTE — TELEPHONE ENCOUNTER
Last refill: 5/4/22 #1 w/ 0 refills    Last OV: 6/2/22  Last labs: 5/4/22    Future Appointments   Date Time Provider Ervin Steiner   8/29/2022 11:30 AM Loan Oconnell MD [facility] [place]

## 2022-08-29 ENCOUNTER — LABORATORY ENCOUNTER (OUTPATIENT)
Dept: LAB | Age: 27
End: 2022-08-29
Attending: INTERNAL MEDICINE
Payer: MEDICAID

## 2022-08-29 ENCOUNTER — OFFICE VISIT (OUTPATIENT)
Dept: FAMILY MEDICINE CLINIC | Facility: CLINIC | Age: 27
End: 2022-08-29
Payer: MEDICAID

## 2022-08-29 VITALS
WEIGHT: 274.38 LBS | TEMPERATURE: 98 F | DIASTOLIC BLOOD PRESSURE: 80 MMHG | SYSTOLIC BLOOD PRESSURE: 134 MMHG | RESPIRATION RATE: 18 BRPM | BODY MASS INDEX: 50 KG/M2 | OXYGEN SATURATION: 98 % | HEART RATE: 87 BPM

## 2022-08-29 DIAGNOSIS — E11.9 DIABETES MELLITUS WITHOUT COMPLICATION (HCC): ICD-10-CM

## 2022-08-29 DIAGNOSIS — Z30.9 ENCOUNTER FOR CONTRACEPTIVE MANAGEMENT, UNSPECIFIED TYPE: ICD-10-CM

## 2022-08-29 DIAGNOSIS — E66.01 CLASS 2 SEVERE OBESITY DUE TO EXCESS CALORIES WITH SERIOUS COMORBIDITY AND BODY MASS INDEX (BMI) OF 36.0 TO 36.9 IN ADULT (HCC): Primary | ICD-10-CM

## 2022-08-29 DIAGNOSIS — E66.01 CLASS 2 SEVERE OBESITY DUE TO EXCESS CALORIES WITH SERIOUS COMORBIDITY AND BODY MASS INDEX (BMI) OF 36.0 TO 36.9 IN ADULT (HCC): ICD-10-CM

## 2022-08-29 LAB
ALBUMIN SERPL-MCNC: 3.7 G/DL (ref 3.4–5)
ALBUMIN/GLOB SERPL: 0.9 {RATIO} (ref 1–2)
ALP LIVER SERPL-CCNC: 97 U/L
ALT SERPL-CCNC: 82 U/L
ANION GAP SERPL CALC-SCNC: 3 MMOL/L (ref 0–18)
AST SERPL-CCNC: 47 U/L (ref 15–37)
BILIRUB SERPL-MCNC: 0.6 MG/DL (ref 0.1–2)
BUN BLD-MCNC: 9 MG/DL (ref 7–18)
CALCIUM BLD-MCNC: 9.4 MG/DL (ref 8.5–10.1)
CHLORIDE SERPL-SCNC: 111 MMOL/L (ref 98–112)
CHOLEST SERPL-MCNC: 169 MG/DL (ref ?–200)
CO2 SERPL-SCNC: 24 MMOL/L (ref 21–32)
CONTROL LINE PRESENT WITH A CLEAR BACKGROUND (YES/NO): YES YES/NO
CREAT BLD-MCNC: 0.64 MG/DL
EST. AVERAGE GLUCOSE BLD GHB EST-MCNC: 140 MG/DL (ref 68–126)
FASTING PATIENT LIPID ANSWER: NO
FASTING STATUS PATIENT QL REPORTED: NO
GFR SERPLBLD BASED ON 1.73 SQ M-ARVRAT: 125 ML/MIN/1.73M2 (ref 60–?)
GLOBULIN PLAS-MCNC: 3.9 G/DL (ref 2.8–4.4)
GLUCOSE BLD-MCNC: 133 MG/DL (ref 70–99)
HBA1C MFR BLD: 6.5 % (ref ?–5.7)
HDLC SERPL-MCNC: 36 MG/DL (ref 40–59)
LDLC SERPL CALC-MCNC: 95 MG/DL (ref ?–100)
NONHDLC SERPL-MCNC: 133 MG/DL (ref ?–130)
OSMOLALITY SERPL CALC.SUM OF ELEC: 287 MOSM/KG (ref 275–295)
POTASSIUM SERPL-SCNC: 3.9 MMOL/L (ref 3.5–5.1)
PROT SERPL-MCNC: 7.6 G/DL (ref 6.4–8.2)
SODIUM SERPL-SCNC: 138 MMOL/L (ref 136–145)
TRIGL SERPL-MCNC: 220 MG/DL (ref 30–149)
VLDLC SERPL CALC-MCNC: 36 MG/DL (ref 0–30)

## 2022-08-29 PROCEDURE — 80053 COMPREHEN METABOLIC PANEL: CPT

## 2022-08-29 PROCEDURE — 83036 HEMOGLOBIN GLYCOSYLATED A1C: CPT

## 2022-08-29 PROCEDURE — 80061 LIPID PANEL: CPT

## 2022-08-29 PROCEDURE — 36415 COLL VENOUS BLD VENIPUNCTURE: CPT

## 2022-08-29 RX ORDER — BIOTIN 1 MG
2500 TABLET ORAL DAILY
COMMUNITY

## 2022-08-29 RX ORDER — PROGESTERONE 100 MG/1
100 CAPSULE ORAL NIGHTLY
COMMUNITY

## 2022-08-29 RX ORDER — MEDROXYPROGESTERONE ACETATE 150 MG/ML
150 INJECTION, SUSPENSION INTRAMUSCULAR ONCE
Status: COMPLETED | OUTPATIENT
Start: 2022-08-29 | End: 2022-08-29

## 2022-08-29 RX ADMIN — MEDROXYPROGESTERONE ACETATE 150 MG: 150 INJECTION, SUSPENSION INTRAMUSCULAR at 12:18:00

## 2022-10-22 RX ORDER — GLIMEPIRIDE 2 MG/1
2 TABLET ORAL
Qty: 90 TABLET | Refills: 0 | Status: SHIPPED | OUTPATIENT
Start: 2022-10-22 | End: 2023-01-20

## 2022-10-31 RX ORDER — BUTALBITAL, ACETAMINOPHEN AND CAFFEINE 50; 325; 40 MG/1; MG/1; MG/1
1 TABLET ORAL EVERY 4 HOURS PRN
Qty: 30 TABLET | Refills: 0 | Status: SHIPPED | OUTPATIENT
Start: 2022-10-31 | End: 2022-11-30

## 2022-10-31 NOTE — TELEPHONE ENCOUNTER
Last refill 2/23/21  Last OV w/Dr Corrinne Homans 8/29/22  Patient said that she has had a Migraine for the last week. She said she has been out of the [facility] for 1 week so has been taking Tylenol without relief.

## 2022-11-01 ENCOUNTER — MED REC SCAN ONLY (OUTPATIENT)
Dept: FAMILY MEDICINE CLINIC | Facility: CLINIC | Age: 27
End: 2022-11-01

## 2022-11-08 ENCOUNTER — TELEPHONE (OUTPATIENT)
Dept: FAMILY MEDICINE CLINIC | Facility: CLINIC | Age: 27
End: 2022-11-08

## 2022-11-08 NOTE — TELEPHONE ENCOUNTER
Has faxed order for oxygen 3 times and is checking on status. Just faxed it for the 3rd time today, 11/8/2022.

## 2022-11-08 NOTE — TELEPHONE ENCOUNTER
Patient has not been seen in this office since Jan, 2020. Has declined to schedule any appointments after having been notified, since 7/2021. LM for Ladan Done at [address] [facility] we are not currently this patient's provider and to contact patient to find out her current provider for sleep therapy.

## 2022-11-14 NOTE — TELEPHONE ENCOUNTER
butalbital-acetaminophen-caffeine -40 MG Oral Tab - [facility] [address] [place]. Pt said this medication was refilled 10/31 but pharmacy didn't have it.

## 2022-11-15 RX ORDER — BUTALBITAL, ACETAMINOPHEN AND CAFFEINE 50; 325; 40 MG/1; MG/1; MG/1
1 TABLET ORAL EVERY 4 HOURS PRN
Qty: 30 TABLET | Refills: 0 | Status: SHIPPED | OUTPATIENT
Start: 2022-11-15 | End: 2022-12-15

## 2023-01-24 RX ORDER — GLIMEPIRIDE 2 MG/1
2 TABLET ORAL
Qty: 90 TABLET | Refills: 0 | Status: SHIPPED | OUTPATIENT
Start: 2023-01-24 | End: 2023-04-24

## 2023-01-24 NOTE — TELEPHONE ENCOUNTER
Last refilled  10/22/22 #90/0  Last OV and labs 8/29/22 (advisied recheck in 3 months)  [facility] sent advising patient she is due for ov

## 2023-01-30 ENCOUNTER — OFFICE VISIT (OUTPATIENT)
Dept: FAMILY MEDICINE CLINIC | Facility: CLINIC | Age: 28
End: 2023-01-30
Payer: MEDICAID

## 2023-01-30 DIAGNOSIS — E11.9 DIABETES MELLITUS WITHOUT COMPLICATION (HCC): Primary | ICD-10-CM

## 2023-02-09 NOTE — TELEPHONE ENCOUNTER
Future Appointments   Date Time Provider Ervin Obdulia   2/13/2023  1:45 PM Jan Martins MD [facility] [place]

## 2023-02-13 ENCOUNTER — LABORATORY ENCOUNTER (OUTPATIENT)
Dept: LAB | Age: 28
End: 2023-02-13
Attending: INTERNAL MEDICINE
Payer: MEDICAID

## 2023-02-13 ENCOUNTER — OFFICE VISIT (OUTPATIENT)
Dept: FAMILY MEDICINE CLINIC | Facility: CLINIC | Age: 28
End: 2023-02-13
Payer: MEDICAID

## 2023-02-13 VITALS
HEART RATE: 95 BPM | DIASTOLIC BLOOD PRESSURE: 78 MMHG | BODY MASS INDEX: 51 KG/M2 | SYSTOLIC BLOOD PRESSURE: 132 MMHG | WEIGHT: 278 LBS | OXYGEN SATURATION: 98 % | TEMPERATURE: 98 F | RESPIRATION RATE: 18 BRPM

## 2023-02-13 DIAGNOSIS — E11.9 DIABETES MELLITUS WITHOUT COMPLICATION (HCC): ICD-10-CM

## 2023-02-13 DIAGNOSIS — Z30.42 ENCOUNTER FOR SURVEILLANCE OF INJECTABLE CONTRACEPTIVE: Primary | ICD-10-CM

## 2023-02-13 LAB
ALBUMIN SERPL-MCNC: 3.6 G/DL (ref 3.4–5)
ALBUMIN/GLOB SERPL: 0.9 {RATIO} (ref 1–2)
ALP LIVER SERPL-CCNC: 125 U/L
ALT SERPL-CCNC: 134 U/L
ANION GAP SERPL CALC-SCNC: 6 MMOL/L (ref 0–18)
AST SERPL-CCNC: 89 U/L (ref 15–37)
BILIRUB SERPL-MCNC: 0.4 MG/DL (ref 0.1–2)
BUN BLD-MCNC: 7 MG/DL (ref 7–18)
CALCIUM BLD-MCNC: 9.2 MG/DL (ref 8.5–10.1)
CHLORIDE SERPL-SCNC: 108 MMOL/L (ref 98–112)
CHOLEST SERPL-MCNC: 172 MG/DL (ref ?–200)
CO2 SERPL-SCNC: 25 MMOL/L (ref 21–32)
CONTROL LINE PRESENT WITH A CLEAR BACKGROUND (YES/NO): YES YES/NO
CREAT BLD-MCNC: 0.63 MG/DL
EST. AVERAGE GLUCOSE BLD GHB EST-MCNC: 206 MG/DL (ref 68–126)
FASTING PATIENT LIPID ANSWER: YES
FASTING STATUS PATIENT QL REPORTED: YES
GFR SERPLBLD BASED ON 1.73 SQ M-ARVRAT: 125 ML/MIN/1.73M2 (ref 60–?)
GLOBULIN PLAS-MCNC: 4.2 G/DL (ref 2.8–4.4)
GLUCOSE BLD-MCNC: 187 MG/DL (ref 70–99)
HBA1C MFR BLD: 8.8 % (ref ?–5.7)
HDLC SERPL-MCNC: 41 MG/DL (ref 40–59)
LDLC SERPL CALC-MCNC: 95 MG/DL (ref ?–100)
NONHDLC SERPL-MCNC: 131 MG/DL (ref ?–130)
OSMOLALITY SERPL CALC.SUM OF ELEC: 291 MOSM/KG (ref 275–295)
POTASSIUM SERPL-SCNC: 3.8 MMOL/L (ref 3.5–5.1)
PREGNANCY TEST, URINE: NEGATIVE
PROT SERPL-MCNC: 7.8 G/DL (ref 6.4–8.2)
SODIUM SERPL-SCNC: 139 MMOL/L (ref 136–145)
TRIGL SERPL-MCNC: 214 MG/DL (ref 30–149)
VLDLC SERPL CALC-MCNC: 35 MG/DL (ref 0–30)

## 2023-02-13 PROCEDURE — 3052F HG A1C>EQUAL 8.0%<EQUAL 9.0%: CPT

## 2023-02-13 PROCEDURE — 36415 COLL VENOUS BLD VENIPUNCTURE: CPT

## 2023-02-13 PROCEDURE — 83036 HEMOGLOBIN GLYCOSYLATED A1C: CPT

## 2023-02-13 PROCEDURE — 80061 LIPID PANEL: CPT

## 2023-02-13 PROCEDURE — 80053 COMPREHEN METABOLIC PANEL: CPT

## 2023-02-13 RX ORDER — MEDROXYPROGESTERONE ACETATE 150 MG/ML
150 INJECTION, SUSPENSION INTRAMUSCULAR ONCE
Status: COMPLETED | OUTPATIENT
Start: 2023-02-13 | End: 2023-02-13

## 2023-02-13 RX ADMIN — MEDROXYPROGESTERONE ACETATE 150 MG: 150 INJECTION, SUSPENSION INTRAMUSCULAR at 13:41:00

## 2023-02-14 RX ORDER — GLIMEPIRIDE 4 MG/1
4 TABLET ORAL
Qty: 90 TABLET | Refills: 0 | Status: SHIPPED | OUTPATIENT
Start: 2023-02-14 | End: 2023-05-15

## 2023-02-14 RX ORDER — METFORMIN HYDROCHLORIDE 500 MG/1
500 TABLET, EXTENDED RELEASE ORAL
Qty: 90 TABLET | Refills: 3 | COMMUNITY
Start: 2023-02-14

## 2023-04-24 RX ORDER — METFORMIN HYDROCHLORIDE 500 MG/1
500 TABLET, EXTENDED RELEASE ORAL
Qty: 90 TABLET | Refills: 3 | Status: SHIPPED | OUTPATIENT
Start: 2023-04-24

## 2023-04-24 RX ORDER — GLIMEPIRIDE 4 MG/1
4 TABLET ORAL
Qty: 90 TABLET | Refills: 0 | Status: SHIPPED | OUTPATIENT
Start: 2023-04-24 | End: 2023-07-23

## 2023-05-08 ENCOUNTER — TELEPHONE (OUTPATIENT)
Dept: FAMILY MEDICINE CLINIC | Facility: CLINIC | Age: 28
End: 2023-05-08

## 2023-05-08 NOTE — TELEPHONE ENCOUNTER
Spoke with patient sore throat x 1 day no fever, was exposed to Strep  Future Appointments   Date Time Provider Ervin Steiner   5/9/2023  8:30 AM BEATRIS David [facility] [place]

## 2023-05-09 ENCOUNTER — OFFICE VISIT (OUTPATIENT)
Dept: FAMILY MEDICINE CLINIC | Facility: CLINIC | Age: 28
End: 2023-05-09
Payer: MEDICAID

## 2023-05-09 VITALS
HEART RATE: 94 BPM | HEIGHT: 62 IN | WEIGHT: 274.63 LBS | SYSTOLIC BLOOD PRESSURE: 120 MMHG | RESPIRATION RATE: 19 BRPM | TEMPERATURE: 99 F | BODY MASS INDEX: 50.54 KG/M2 | DIASTOLIC BLOOD PRESSURE: 86 MMHG | OXYGEN SATURATION: 97 %

## 2023-05-09 DIAGNOSIS — Z20.818 STREPTOCOCCUS EXPOSURE: Primary | ICD-10-CM

## 2023-05-09 DIAGNOSIS — J02.0 STREP PHARYNGITIS: ICD-10-CM

## 2023-05-09 LAB
CONTROL LINE PRESENT WITH A CLEAR BACKGROUND (YES/NO): YES YES/NO
KIT LOT #: 5064 NUMERIC
STREP GRP A CUL-SCR: POSITIVE

## 2023-05-09 PROCEDURE — 3079F DIAST BP 80-89 MM HG: CPT

## 2023-05-09 PROCEDURE — 87880 STREP A ASSAY W/OPTIC: CPT

## 2023-05-09 PROCEDURE — 3008F BODY MASS INDEX DOCD: CPT

## 2023-05-09 PROCEDURE — 3074F SYST BP LT 130 MM HG: CPT

## 2023-05-09 PROCEDURE — 99213 OFFICE O/P EST LOW 20 MIN: CPT

## 2023-05-09 RX ORDER — AMOXICILLIN 500 MG/1
500 CAPSULE ORAL 3 TIMES DAILY
Qty: 30 CAPSULE | Refills: 0 | Status: SHIPPED | OUTPATIENT
Start: 2023-05-09 | End: 2023-05-19

## 2023-05-13 ENCOUNTER — TELEPHONE (OUTPATIENT)
Dept: FAMILY MEDICINE CLINIC | Facility: CLINIC | Age: 28
End: 2023-05-13

## 2023-05-13 NOTE — TELEPHONE ENCOUNTER
Pt still isn't feeling great. She is supposed to go back to work on Tuesday.   She wanted to know if that is ok or should she take more time off to heal.

## 2023-05-15 ENCOUNTER — TELEPHONE (OUTPATIENT)
Dept: FAMILY MEDICINE CLINIC | Facility: CLINIC | Age: 28
End: 2023-05-15

## 2023-05-15 NOTE — TELEPHONE ENCOUNTER
Patient said that she is feeling better. She said that she wants to go back tomorrow. Note up front for patient to .  V.O. Dr Eliel Chacon RN

## 2023-06-23 ENCOUNTER — TELEPHONE (OUTPATIENT)
Dept: FAMILY MEDICINE CLINIC | Facility: CLINIC | Age: 28
End: 2023-06-23

## 2023-06-23 DIAGNOSIS — Z30.9 ENCOUNTER FOR CONTRACEPTIVE MANAGEMENT, UNSPECIFIED TYPE: Primary | ICD-10-CM

## 2023-06-23 RX ORDER — MEDROXYPROGESTERONE ACETATE 150 MG/ML
150 INJECTION, SUSPENSION INTRAMUSCULAR
Qty: 1 ML | Refills: 0 | Status: SHIPPED | OUTPATIENT
Start: 2023-06-23

## 2023-06-26 ENCOUNTER — OFFICE VISIT (OUTPATIENT)
Dept: FAMILY MEDICINE CLINIC | Facility: CLINIC | Age: 28
End: 2023-06-26
Payer: MEDICAID

## 2023-06-26 VITALS
HEIGHT: 62 IN | BODY MASS INDEX: 49.5 KG/M2 | HEART RATE: 84 BPM | TEMPERATURE: 97 F | SYSTOLIC BLOOD PRESSURE: 122 MMHG | RESPIRATION RATE: 16 BRPM | OXYGEN SATURATION: 98 % | WEIGHT: 269 LBS | DIASTOLIC BLOOD PRESSURE: 80 MMHG

## 2023-06-26 DIAGNOSIS — H02.89 EYELID PAIN, RIGHT: Primary | ICD-10-CM

## 2023-06-26 DIAGNOSIS — Z30.9 ENCOUNTER FOR CONTRACEPTIVE MANAGEMENT, UNSPECIFIED TYPE: ICD-10-CM

## 2023-06-26 LAB
CONTROL LINE PRESENT WITH A CLEAR BACKGROUND (YES/NO): YES YES/NO
KIT LOT #: NORMAL NUMERIC
PREGNANCY TEST, URINE: NEGATIVE

## 2023-06-26 RX ADMIN — MEDROXYPROGESTERONE ACETATE 150 MG: 150 INJECTION, SUSPENSION INTRAMUSCULAR at 14:21:00

## 2023-08-14 NOTE — TELEPHONE ENCOUNTER
Last OV w/Dr Mayo Deras 2/13/23  Last Hgba1c 2/13/23  No future appointments scheduled with Dr Mayo Deras. Mom advised she needs to make an appointment.  LM for patient on her VM with this information

## 2023-08-15 RX ORDER — GLIMEPIRIDE 4 MG/1
4 TABLET ORAL
Qty: 90 TABLET | Refills: 0 | Status: SHIPPED | OUTPATIENT
Start: 2023-08-15

## 2023-08-28 ENCOUNTER — TELEPHONE (OUTPATIENT)
Dept: FAMILY MEDICINE CLINIC | Facility: CLINIC | Age: 28
End: 2023-08-28

## 2023-08-28 NOTE — TELEPHONE ENCOUNTER
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 THE NEXT NOTE NEEDS TO SAY SHE NEEDS TO USE THE BATHROOM WHEN SHE ASKS.   CALL WHEN READY

## 2023-08-30 ENCOUNTER — LABORATORY ENCOUNTER (OUTPATIENT)
Dept: LAB | Age: 28
End: 2023-08-30
Attending: INTERNAL MEDICINE
Payer: MEDICAID

## 2023-08-30 ENCOUNTER — OFFICE VISIT (OUTPATIENT)
Dept: FAMILY MEDICINE CLINIC | Facility: CLINIC | Age: 28
End: 2023-08-30
Payer: MEDICAID

## 2023-08-30 VITALS
TEMPERATURE: 97 F | WEIGHT: 277 LBS | RESPIRATION RATE: 18 BRPM | OXYGEN SATURATION: 98 % | SYSTOLIC BLOOD PRESSURE: 134 MMHG | BODY MASS INDEX: 50.97 KG/M2 | HEIGHT: 62 IN | HEART RATE: 88 BPM | DIASTOLIC BLOOD PRESSURE: 82 MMHG

## 2023-08-30 DIAGNOSIS — E66.01 CLASS 2 SEVERE OBESITY DUE TO EXCESS CALORIES WITH SERIOUS COMORBIDITY AND BODY MASS INDEX (BMI) OF 36.0 TO 36.9 IN ADULT: Primary | ICD-10-CM

## 2023-08-30 DIAGNOSIS — Z00.00 WELL ADULT EXAM: ICD-10-CM

## 2023-08-30 DIAGNOSIS — E11.9 DIABETES MELLITUS WITHOUT COMPLICATION (HCC): ICD-10-CM

## 2023-08-30 DIAGNOSIS — R53.83 OTHER FATIGUE: ICD-10-CM

## 2023-08-30 DIAGNOSIS — E55.9 VITAMIN D DEFICIENCY: ICD-10-CM

## 2023-08-30 DIAGNOSIS — G47.33 OSA ON CPAP: ICD-10-CM

## 2023-08-30 DIAGNOSIS — F32.0 CURRENT MILD EPISODE OF MAJOR DEPRESSIVE DISORDER, UNSPECIFIED WHETHER RECURRENT (HCC): ICD-10-CM

## 2023-08-30 LAB
ALBUMIN SERPL-MCNC: 3.5 G/DL (ref 3.4–5)
ALBUMIN/GLOB SERPL: 0.8 {RATIO} (ref 1–2)
ALP LIVER SERPL-CCNC: 118 U/L
ALT SERPL-CCNC: 103 U/L
ANION GAP SERPL CALC-SCNC: 6 MMOL/L (ref 0–18)
AST SERPL-CCNC: 58 U/L (ref 15–37)
BASOPHILS # BLD AUTO: 0.05 X10(3) UL (ref 0–0.2)
BASOPHILS NFR BLD AUTO: 0.4 %
BILIRUB SERPL-MCNC: 0.6 MG/DL (ref 0.1–2)
BUN BLD-MCNC: 9 MG/DL (ref 7–18)
CALCIUM BLD-MCNC: 9.2 MG/DL (ref 8.5–10.1)
CHLORIDE SERPL-SCNC: 107 MMOL/L (ref 98–112)
CHOLEST SERPL-MCNC: 182 MG/DL (ref ?–200)
CO2 SERPL-SCNC: 23 MMOL/L (ref 21–32)
CREAT BLD-MCNC: 0.58 MG/DL
EGFRCR SERPLBLD CKD-EPI 2021: 127 ML/MIN/1.73M2 (ref 60–?)
EOSINOPHIL # BLD AUTO: 0.24 X10(3) UL (ref 0–0.7)
EOSINOPHIL NFR BLD AUTO: 1.8 %
ERYTHROCYTE [DISTWIDTH] IN BLOOD BY AUTOMATED COUNT: 12.5 %
EST. AVERAGE GLUCOSE BLD GHB EST-MCNC: 258 MG/DL (ref 68–126)
FASTING PATIENT LIPID ANSWER: YES
FASTING STATUS PATIENT QL REPORTED: YES
GLOBULIN PLAS-MCNC: 4.4 G/DL (ref 2.8–4.4)
GLUCOSE BLD-MCNC: 258 MG/DL (ref 70–99)
HBA1C MFR BLD: 10.6 % (ref ?–5.7)
HCT VFR BLD AUTO: 43.2 %
HDLC SERPL-MCNC: 38 MG/DL (ref 40–59)
HGB BLD-MCNC: 14.1 G/DL
IMM GRANULOCYTES # BLD AUTO: 0.15 X10(3) UL (ref 0–1)
IMM GRANULOCYTES NFR BLD: 1.1 %
LDLC SERPL CALC-MCNC: 101 MG/DL (ref ?–100)
LYMPHOCYTES # BLD AUTO: 2.82 X10(3) UL (ref 1–4)
LYMPHOCYTES NFR BLD AUTO: 21.6 %
MCH RBC QN AUTO: 27.3 PG (ref 26–34)
MCHC RBC AUTO-ENTMCNC: 32.6 G/DL (ref 31–37)
MCV RBC AUTO: 83.7 FL
MONOCYTES # BLD AUTO: 0.69 X10(3) UL (ref 0.1–1)
MONOCYTES NFR BLD AUTO: 5.3 %
NEUTROPHILS # BLD AUTO: 9.1 X10 (3) UL (ref 1.5–7.7)
NEUTROPHILS # BLD AUTO: 9.1 X10(3) UL (ref 1.5–7.7)
NEUTROPHILS NFR BLD AUTO: 69.8 %
NONHDLC SERPL-MCNC: 144 MG/DL (ref ?–130)
OSMOLALITY SERPL CALC.SUM OF ELEC: 290 MOSM/KG (ref 275–295)
PLATELET # BLD AUTO: 411 10(3)UL (ref 150–450)
POTASSIUM SERPL-SCNC: 3.8 MMOL/L (ref 3.5–5.1)
PROT SERPL-MCNC: 7.9 G/DL (ref 6.4–8.2)
RBC # BLD AUTO: 5.16 X10(6)UL
SODIUM SERPL-SCNC: 136 MMOL/L (ref 136–145)
T4 FREE SERPL-MCNC: 1.1 NG/DL (ref 0.8–1.7)
TRIGL SERPL-MCNC: 252 MG/DL (ref 30–149)
TSI SER-ACNC: 1.9 MIU/ML (ref 0.36–3.74)
VIT D+METAB SERPL-MCNC: 15.4 NG/ML (ref 30–100)
VLDLC SERPL CALC-MCNC: 42 MG/DL (ref 0–30)
WBC # BLD AUTO: 13.1 X10(3) UL (ref 4–11)

## 2023-08-30 PROCEDURE — 3008F BODY MASS INDEX DOCD: CPT | Performed by: INTERNAL MEDICINE

## 2023-08-30 PROCEDURE — 80061 LIPID PANEL: CPT

## 2023-08-30 PROCEDURE — 99395 PREV VISIT EST AGE 18-39: CPT | Performed by: INTERNAL MEDICINE

## 2023-08-30 PROCEDURE — 85025 COMPLETE CBC W/AUTO DIFF WBC: CPT

## 2023-08-30 PROCEDURE — 84439 ASSAY OF FREE THYROXINE: CPT

## 2023-08-30 PROCEDURE — 83036 HEMOGLOBIN GLYCOSYLATED A1C: CPT

## 2023-08-30 PROCEDURE — 3075F SYST BP GE 130 - 139MM HG: CPT | Performed by: INTERNAL MEDICINE

## 2023-08-30 PROCEDURE — 3079F DIAST BP 80-89 MM HG: CPT | Performed by: INTERNAL MEDICINE

## 2023-08-30 PROCEDURE — 84443 ASSAY THYROID STIM HORMONE: CPT

## 2023-08-30 PROCEDURE — 82306 VITAMIN D 25 HYDROXY: CPT

## 2023-08-30 PROCEDURE — 36415 COLL VENOUS BLD VENIPUNCTURE: CPT

## 2023-08-30 PROCEDURE — 80053 COMPREHEN METABOLIC PANEL: CPT

## 2023-08-30 RX ORDER — BUTALBITAL, ACETAMINOPHEN AND CAFFEINE 50; 325; 40 MG/1; MG/1; MG/1
1 TABLET ORAL EVERY 4 HOURS PRN
COMMUNITY

## 2023-08-30 RX ORDER — GARLIC EXTRACT 500 MG
1 CAPSULE ORAL DAILY
COMMUNITY

## 2023-09-27 ENCOUNTER — TELEPHONE (OUTPATIENT)
Dept: FAMILY MEDICINE CLINIC | Facility: CLINIC | Age: 28
End: 2023-09-27

## 2023-09-27 NOTE — TELEPHONE ENCOUNTER
Pt was told she needs to contact diabetic educator, but lost the phone number. And may be developing a UTI.

## 2023-09-27 NOTE — TELEPHONE ENCOUNTER
LM for patient on her identified VM with the phone number for diabetic education [phone] and to schedule an appointment for  UTI.

## 2023-11-06 ENCOUNTER — TELEPHONE (OUTPATIENT)
Dept: FAMILY MEDICINE CLINIC | Facility: CLINIC | Age: 28
End: 2023-11-06

## 2023-11-14 ENCOUNTER — TELEPHONE (OUTPATIENT)
Dept: FAMILY MEDICINE CLINIC | Facility: CLINIC | Age: 28
End: 2023-11-14

## 2023-11-14 RX ORDER — GLIMEPIRIDE 4 MG/1
4 TABLET ORAL
Qty: 90 TABLET | Refills: 0 | Status: SHIPPED | OUTPATIENT
Start: 2023-11-14

## 2023-11-14 NOTE — TELEPHONE ENCOUNTER
PT CALLING TO CHECK ON STATUS OF MEDICATION REFILL FOR GLIMEPIRIDE. WILL NOT BE AVAILABLE TO TALK UNTIL 7 PM BUT CAN LEAVE A MESSAGE.

## 2024-02-08 RX ORDER — GLIMEPIRIDE 4 MG/1
4 TABLET ORAL
Qty: 30 TABLET | Refills: 0 | Status: SHIPPED | OUTPATIENT
Start: 2024-02-08

## 2024-02-08 NOTE — TELEPHONE ENCOUNTER
OV 08/30/23  LABS 08/30    REFILL 11/14 #90    No future appointments. [facility] SENT TO SCHEDULE MED F/U

## 2024-02-18 NOTE — TELEPHONE ENCOUNTER
PC to pt. Left detailed message on identified approved VM that pt is on day 4 of abx, will need to take full 10 days to see results, but that abx won't help with the sore throat, can take tylenol/motrin, chloraseptic spray or lozenges to help with sore throat. Dr. Nely Nam not in office today, pt should call back on Monday when it is closer to when she is supposed to return to work to assess how she is feeling, will be on abx another 3 days before needing to go back. Pt to call back with any further concerns and update Monday. none

## 2024-03-07 RX ORDER — GLIMEPIRIDE 4 MG/1
4 TABLET ORAL
Qty: 90 TABLET | Refills: 0 | Status: SHIPPED | OUTPATIENT
Start: 2024-03-07

## 2024-03-07 NOTE — TELEPHONE ENCOUNTER
LOV: 8-30-23    LAST LAB:8-30-23    LAST RX:  glimepiride 4 MG Oral Tab 30 tablet 0 2/8/2024 --   Sig:   Take 1 tablet (4 mg total) by mouth before breakfast.           Next OV:    Future Appointments   Date Time Provider Department Center   3/25/2024  3:45 PM Apolonia Pickard MD [facility] [place]            PROTOCOL    Diabetes Medication Protocol [id number]/2024 12:27 PM   Protocol Details Last A1C < 7.5 and within past 6 months    In person appointment or virtual visit in the past 6 mos or appointment in next 3 mos    Microalbumin procedure in past 12 months or taking ACE/ARB    EGFRCR or GFRNAA > 50    GFR in the past 12 months

## 2024-03-08 NOTE — TELEPHONE ENCOUNTER
Future Appointments   Date Time Provider Department Center   3/25/2024  3:45 PM Apolonia Pickard MD [facility] [place]

## 2024-03-25 ENCOUNTER — OFFICE VISIT (OUTPATIENT)
Dept: FAMILY MEDICINE CLINIC | Facility: CLINIC | Age: 29
End: 2024-03-25
Payer: MEDICAID

## 2024-03-25 ENCOUNTER — LABORATORY ENCOUNTER (OUTPATIENT)
Dept: LAB | Age: 29
End: 2024-03-25
Attending: INTERNAL MEDICINE
Payer: MEDICAID

## 2024-03-25 VITALS
SYSTOLIC BLOOD PRESSURE: 122 MMHG | OXYGEN SATURATION: 98 % | RESPIRATION RATE: 18 BRPM | TEMPERATURE: 98 F | HEIGHT: 62.5 IN | WEIGHT: 276 LBS | BODY MASS INDEX: 49.52 KG/M2 | HEART RATE: 73 BPM | DIASTOLIC BLOOD PRESSURE: 80 MMHG

## 2024-03-25 DIAGNOSIS — E11.9 DIABETES MELLITUS WITHOUT COMPLICATION (HCC): ICD-10-CM

## 2024-03-25 DIAGNOSIS — Z00.00 WELL ADULT EXAM: Primary | ICD-10-CM

## 2024-03-25 DIAGNOSIS — R53.83 OTHER FATIGUE: ICD-10-CM

## 2024-03-25 DIAGNOSIS — E66.01 CLASS 2 SEVERE OBESITY DUE TO EXCESS CALORIES WITH SERIOUS COMORBIDITY AND BODY MASS INDEX (BMI) OF 36.0 TO 36.9 IN ADULT (HCC): ICD-10-CM

## 2024-03-25 DIAGNOSIS — E55.9 VITAMIN D DEFICIENCY: ICD-10-CM

## 2024-03-25 DIAGNOSIS — E66.01 MORBID OBESITY (HCC): ICD-10-CM

## 2024-03-25 DIAGNOSIS — E28.2 PCO (POLYCYSTIC OVARIES): Primary | ICD-10-CM

## 2024-03-25 LAB
ALBUMIN SERPL-MCNC: 3.4 G/DL (ref 3.4–5)
ALBUMIN/GLOB SERPL: 0.9 {RATIO} (ref 1–2)
ALP LIVER SERPL-CCNC: 114 U/L
ALT SERPL-CCNC: 97 U/L
ANION GAP SERPL CALC-SCNC: 7 MMOL/L (ref 0–18)
AST SERPL-CCNC: 54 U/L (ref 15–37)
BASOPHILS # BLD AUTO: 0.05 X10(3) UL (ref 0–0.2)
BASOPHILS NFR BLD AUTO: 0.4 %
BILIRUB SERPL-MCNC: 0.5 MG/DL (ref 0.1–2)
BUN BLD-MCNC: 7 MG/DL (ref 9–23)
CALCIUM BLD-MCNC: 9.6 MG/DL (ref 8.5–10.1)
CHLORIDE SERPL-SCNC: 107 MMOL/L (ref 98–112)
CO2 SERPL-SCNC: 25 MMOL/L (ref 21–32)
CREAT BLD-MCNC: 0.58 MG/DL
CREAT UR-SCNC: 154 MG/DL
EGFRCR SERPLBLD CKD-EPI 2021: 126 ML/MIN/1.73M2 (ref 60–?)
EOSINOPHIL # BLD AUTO: 0.17 X10(3) UL (ref 0–0.7)
EOSINOPHIL NFR BLD AUTO: 1.4 %
ERYTHROCYTE [DISTWIDTH] IN BLOOD BY AUTOMATED COUNT: 12.6 %
EST. AVERAGE GLUCOSE BLD GHB EST-MCNC: 263 MG/DL (ref 68–126)
FASTING STATUS PATIENT QL REPORTED: YES
GLOBULIN PLAS-MCNC: 3.7 G/DL (ref 2.8–4.4)
GLUCOSE BLD-MCNC: 267 MG/DL (ref 70–99)
HBA1C MFR BLD: 10.8 % (ref ?–5.7)
HCG UR QL: NEGATIVE
HCT VFR BLD AUTO: 42.2 %
HGB BLD-MCNC: 14 G/DL
IMM GRANULOCYTES # BLD AUTO: 0.1 X10(3) UL (ref 0–1)
IMM GRANULOCYTES NFR BLD: 0.8 %
LYMPHOCYTES # BLD AUTO: 2.91 X10(3) UL (ref 1–4)
LYMPHOCYTES NFR BLD AUTO: 24.4 %
MCH RBC QN AUTO: 26.6 PG (ref 26–34)
MCHC RBC AUTO-ENTMCNC: 33.2 G/DL (ref 31–37)
MCV RBC AUTO: 80.2 FL
MICROALBUMIN UR-MCNC: 7.95 MG/DL
MICROALBUMIN/CREAT 24H UR-RTO: 51.6 UG/MG (ref ?–30)
MONOCYTES # BLD AUTO: 0.62 X10(3) UL (ref 0.1–1)
MONOCYTES NFR BLD AUTO: 5.2 %
NEUTROPHILS # BLD AUTO: 8.1 X10 (3) UL (ref 1.5–7.7)
NEUTROPHILS # BLD AUTO: 8.1 X10(3) UL (ref 1.5–7.7)
NEUTROPHILS NFR BLD AUTO: 67.8 %
OSMOLALITY SERPL CALC.SUM OF ELEC: 295 MOSM/KG (ref 275–295)
PLATELET # BLD AUTO: 439 10(3)UL (ref 150–450)
POTASSIUM SERPL-SCNC: 3.9 MMOL/L (ref 3.5–5.1)
PROT SERPL-MCNC: 7.1 G/DL (ref 6.4–8.2)
RBC # BLD AUTO: 5.26 X10(6)UL
SODIUM SERPL-SCNC: 139 MMOL/L (ref 136–145)
WBC # BLD AUTO: 12 X10(3) UL (ref 4–11)

## 2024-03-25 PROCEDURE — 36415 COLL VENOUS BLD VENIPUNCTURE: CPT

## 2024-03-25 PROCEDURE — 81025 URINE PREGNANCY TEST: CPT

## 2024-03-25 PROCEDURE — 80053 COMPREHEN METABOLIC PANEL: CPT

## 2024-03-25 PROCEDURE — 82043 UR ALBUMIN QUANTITATIVE: CPT

## 2024-03-25 PROCEDURE — 83036 HEMOGLOBIN GLYCOSYLATED A1C: CPT

## 2024-03-25 PROCEDURE — 85025 COMPLETE CBC W/AUTO DIFF WBC: CPT

## 2024-03-25 PROCEDURE — 82570 ASSAY OF URINE CREATININE: CPT

## 2024-03-25 PROCEDURE — 99214 OFFICE O/P EST MOD 30 MIN: CPT | Performed by: INTERNAL MEDICINE

## 2024-03-25 RX ORDER — MEDROXYPROGESTERONE ACETATE 150 MG/ML
150 INJECTION, SUSPENSION INTRAMUSCULAR
Qty: 1 ML | Refills: 0 | Status: SHIPPED | OUTPATIENT
Start: 2024-03-25

## 2024-03-25 RX ORDER — MEDROXYPROGESTERONE ACETATE 150 MG/ML
150 INJECTION, SUSPENSION INTRAMUSCULAR
Qty: 1 ML | Refills: 0 | Status: SHIPPED | OUTPATIENT
Start: 2024-03-25 | End: 2024-03-25

## 2024-03-25 NOTE — TELEPHONE ENCOUNTER
Last refill 6/23/23  Last Depo was administered 6/26/23  Patient states she missed a dose due to the holidays.   Patient advised she will need a pregnancy test. Nurse appointment scheduled tomorrow at 11am

## 2024-03-25 NOTE — PROGRESS NOTES
HPI:   Toyin Villegas is a 28 year old female who presents for recheck of her diabetes. Patient’s BS have been 100-200s. Last visit with ophthalmologist was December 2023 at [facility]. States unable to get an appointment with diabetic educator.   Pt has been checking her feet on a regular basis. Pt  reports intermittent  tingling of the feet. Reports tingling in her feet when she binge eats. Following these tingling episodes, she then checks her blood sugar and reports that her blood sugar is high. These binge episodes happens once a week  and she attributes it to her ADD. Pt denies any issues with depression. Pt complains of right pinky finger pain. She reports hurting it while at work on a screw on the conveyer belt. Hurts with over usage. She reports that it does not limit her ability to do activities of daily living.     Wt Readings from Last 6 Encounters:   03/25/24 276 lb (125.2 kg)   08/30/23 277 lb (125.6 kg)   06/26/23 269 lb (122 kg)   05/09/23 274 lb 9.6 oz (124.6 kg)   02/13/23 278 lb (126.1 kg)   08/29/22 274 lb 6 oz (124.5 kg)     Body mass index is 49.68 kg/m².     Lab Results   Component Value Date    A1C 10.6 (H) 08/30/2023    A1C 8.8 (H) 02/13/2023    A1C 6.5 (H) 08/29/2022     Lab Results   Component Value Date    CHOLEST 182 08/30/2023    CHOLEST 172 02/13/2023    CHOLEST 169 08/29/2022     Lab Results   Component Value Date    HDL 38 (L) 08/30/2023    HDL 41 02/13/2023    HDL 36 (L) 08/29/2022     Lab Results   Component Value Date     (H) 08/30/2023    LDL 95 02/13/2023    LDL 95 08/29/2022     Lab Results   Component Value Date    TRIG 252 (H) 08/30/2023    TRIG 214 (H) 02/13/2023    TRIG 220 (H) 08/29/2022     Lab Results   Component Value Date    AST 58 (H) 08/30/2023    AST 89 (H) 02/13/2023    AST 47 (H) 08/29/2022     Lab Results   Component Value Date     (H) 08/30/2023     (H) 02/13/2023    ALT 82 (H) 08/29/2022     Malb/Cre Calc   Date Value Ref Range  Status   05/04/2022 207.9 (H) <=30.0 ug/mg Final     Comment:     <30 ug/mg creatinine       Normal     ug/mg creatinine   Microalbuminuria   >300 ug/mg creatinine      Albuminuria       12/28/2020 30.5 (H) <=30.0 ug/mg Final     Comment:     <30 ug/mg creatinine       Normal     ug/mg creatinine   Microalbuminuria   >300 ug/mg creatinine      Albuminuria       12/30/2019 10.0 <=30.0 ug/mg Final     Comment:     <30 ug/mg creatinine       Normal     ug/mg creatinine   Microalbuminuria   >300 ug/mg creatinine      Albuminuria           Current Outpatient Medications   Medication Sig Dispense Refill    medroxyPROGESTERone 150 MG/ML Intramuscular Suspension Inject 1 mL (150 mg total) into the muscle every 3 (three) months. 1 mL 0    glimepiride 4 MG Oral Tab TAKE 1 TABLET(4 MG) BY MOUTH BEFORE BREAKFAST 90 tablet 0    butalbital-acetaminophen-caffeine -40 MG Oral Tab Take 1 tablet by mouth every 4 (four) hours as needed for Pain.      metFORMIN  MG Oral Tablet 24 Hr Take 1 tablet (500 mg total) by mouth daily with breakfast. 90 tablet 3    ONETOUCH DELICA LANCETS 33G Does not apply Misc Test twice daily 1 Box 3      Past Medical History:   Diagnosis Date    ADD (attention deficit disorder with hyperactivity)     Depression     Diabetes (HCC)     Fatty liver     Migraines     Obesity, unspecified     PCOS (polycystic ovarian syndrome)       History reviewed. No pertinent surgical history.   Social History:   Social History     Socioeconomic History    Marital status: Single   Tobacco Use    Smoking status: Never    Smokeless tobacco: Never   Vaping Use    Vaping Use: Never used   Substance and Sexual Activity    Alcohol use: Not Currently     Alcohol/week: 1.0 standard drink of alcohol     Types: 1 Glasses of wine per week     Comment: rare- sangria    Drug use: No   Other Topics Concern    Caffeine Concern Yes     Comment: soda     Exercise: minimal.  Diet: watches minimally and  does eat  high protein, low dairy due to lactose intolerant      REVIEW OF SYSTEMS:   GENERAL HEALTH: feels well otherwise  SKIN: denies any unusual skin lesions. Reports rash to forehead   RESPIRATORY: denies shortness of breath with exertion  CARDIOVASCULAR: denies chest pain on exertion  GI: denies abdominal pain. Reports heartburn  NEURO: denies headaches    EXAM:   /80 (BP Location: Left arm, Patient Position: Sitting)   Pulse 73   Temp 97.9 °F (36.6 °C)   Resp 18   Ht 5' 2.5\" (1.588 m)   Wt 276 lb (125.2 kg)   SpO2 98%   BMI 49.68 kg/m²   GENERAL: well developed, well nourished,in no apparent distress  SKIN: no suspicious lesions. Rash to forehead, dandruff   NECK: supple,no adenopathy,no bruits. Acanthosis nigricans   LUNGS: clear to auscultation  CARDIO: RRR without murmur  GI: good BS's,no masses, HSM or tenderness  EXTREMITIES: no cyanosis, clubbing or edema. Small callous on bottom on left foot  NEURO: sensation is intact to monofilament     ASSESSMENT AND PLAN:   Toyin Villegas is a 28 year old female who presents for a recheck of her diabetes.   Recommendations are: continue present meds, check HgbA1C, check fasting lipids and CMP, lose wgt with carbohydrate controlled diet and exercise, check blood sugar daily, check feet daily.  The patient indicates understanding of these issues and agrees to the plan.  The patient is asked to return in 3 months.

## 2024-03-25 NOTE — TELEPHONE ENCOUNTER
Pt needs refill, medroxyPROGESTERone (Depo-Provera) 150 MG/ML injection, [facility]

## 2024-03-26 RX ORDER — SEMAGLUTIDE 0.68 MG/ML
0.25 INJECTION, SOLUTION SUBCUTANEOUS WEEKLY
Qty: 1 EACH | Refills: 0 | Status: SHIPPED | OUTPATIENT
Start: 2024-03-26 | End: 2024-03-27

## 2024-03-27 ENCOUNTER — NURSE ONLY (OUTPATIENT)
Dept: FAMILY MEDICINE CLINIC | Facility: CLINIC | Age: 29
End: 2024-03-27
Payer: MEDICAID

## 2024-03-27 DIAGNOSIS — Z30.42 ENCOUNTER FOR SURVEILLANCE OF INJECTABLE CONTRACEPTIVE: Primary | ICD-10-CM

## 2024-03-27 PROCEDURE — 96372 THER/PROPH/DIAG INJ SC/IM: CPT | Performed by: INTERNAL MEDICINE

## 2024-03-27 RX ORDER — MEDROXYPROGESTERONE ACETATE 150 MG/ML
150 INJECTION, SUSPENSION INTRAMUSCULAR ONCE
Status: COMPLETED | OUTPATIENT
Start: 2024-03-27 | End: 2024-03-27

## 2024-03-27 RX ORDER — SEMAGLUTIDE 0.68 MG/ML
0.25 INJECTION, SOLUTION SUBCUTANEOUS WEEKLY
Qty: 2 EACH | Refills: 0 | COMMUNITY
Start: 2024-03-27

## 2024-03-27 RX ADMIN — MEDROXYPROGESTERONE ACETATE 150 MG: 150 INJECTION, SUSPENSION INTRAMUSCULAR at 11:10:00

## 2024-03-27 NOTE — PROGRESS NOTES
Pt seen and examined in conjunction with NP student.  DM poorly controlled and compliance questionable. She has poor self care, specifically keeping her environment clean and personal hygiene, like washing her hair.  She does not cook and eats out almost every night. She has a job, but just makes ends meet. She has poor follow through with recommendations.  She is months late for a depo shot.  If she can be taught and compliant I think a perfect add on for her might be Ozempic. But only if she is willing give herself an injection.

## 2024-03-27 NOTE — PROGRESS NOTES
Depo Provera given Im to left Ventrogluteal. Urine pregnancy was done 3/25/24 and was negative. Patient given dates to return for next injection June 12th-June 26th.  Patient given 2 samples of Ozempic. Verbally instructed patient how to prepare the syringe for the 0.25mg injection. One dose wasted. Patient return demonstrated injecting herself subcutaneously in her right thigh using proper aseptic technique. Needle disposed of in the sharps container.

## 2024-04-03 RX ORDER — DULAGLUTIDE 1.5 MG/.5ML
1.5 INJECTION, SOLUTION SUBCUTANEOUS WEEKLY
Qty: 4 EACH | Refills: 0 | Status: SHIPPED | OUTPATIENT
Start: 2024-04-03 | End: 2024-05-03

## 2024-04-08 ENCOUNTER — TELEPHONE (OUTPATIENT)
Dept: FAMILY MEDICINE CLINIC | Facility: CLINIC | Age: 29
End: 2024-04-08

## 2024-04-08 NOTE — TELEPHONE ENCOUNTER
Fax received on 4/3/24 that patient's insurance will no longer cover Ozempic, they will cover Trulicity. Script sent to Yamileth Ramírez on 4/3/24. LM for patient on her identified VM with this information.

## 2024-04-09 ENCOUNTER — HOSPITAL ENCOUNTER (OUTPATIENT)
Age: 29
Discharge: HOME | End: 2024-04-09
Payer: MEDICAID

## 2024-04-09 VITALS
HEIGHT: 62 IN | BODY MASS INDEX: 50.79 KG/M2 | RESPIRATION RATE: 18 BRPM | WEIGHT: 276 LBS | DIASTOLIC BLOOD PRESSURE: 84 MMHG | HEART RATE: 91 BPM | SYSTOLIC BLOOD PRESSURE: 123 MMHG | OXYGEN SATURATION: 97 % | TEMPERATURE: 98 F

## 2024-04-09 DIAGNOSIS — L03.115 CELLULITIS OF RIGHT LOWER EXTREMITY: Primary | ICD-10-CM

## 2024-04-09 DIAGNOSIS — R73.9 HYPERGLYCEMIA: ICD-10-CM

## 2024-04-09 LAB — GLUCOSE BLD-MCNC: 214 MG/DL (ref 70–99)

## 2024-04-09 PROCEDURE — 99214 OFFICE O/P EST MOD 30 MIN: CPT | Performed by: NURSE PRACTITIONER

## 2024-04-09 PROCEDURE — 82962 GLUCOSE BLOOD TEST: CPT | Performed by: NURSE PRACTITIONER

## 2024-04-09 RX ORDER — CEPHALEXIN 500 MG/1
500 CAPSULE ORAL 2 TIMES DAILY
Qty: 14 CAPSULE | Refills: 0 | Status: SHIPPED | OUTPATIENT
Start: 2024-04-09 | End: 2024-04-16

## 2024-04-09 NOTE — ED PROVIDER NOTES
Patient Seen in: [facility]      History     Chief Complaint   Patient presents with    Rash Skin Problem     Stated Complaint: infection on leg    Subjective:   HPI   patient is a 28-year-old diabetic, here with complaints of cellulitis to the right lower leg.  Patient noted that there was a ingrown hair approximately a week ago, she pulled the ingrown hair however the area has not completely healed.  She reports that it is red, inflamed.  She states that it hurts when it is palpated.  Feels better when she is resting.  Denies fevers, chills, nausea, vomiting.    Objective:   Past Medical History:   Diagnosis Date    ADD (attention deficit disorder with hyperactivity)     Depression     Diabetes (HCC)     Fatty liver     Migraines     Obesity, unspecified     PCOS (polycystic ovarian syndrome)               No pertinent past surgical history.              No pertinent social history.            Review of Systems    Positive for stated complaint: infection on leg  Other systems are as noted in HPI.  Constitutional and vital signs reviewed.      All other systems reviewed and negative except as noted above.    Physical Exam     ED Triage Vitals [04/09/24 1522]   /84   Pulse 91   Resp 18   Temp 97.8 °F (36.6 °C)   Temp src Temporal   SpO2 97 %   O2 Device None (Room air)       Current:/84   Pulse 91   Temp 97.8 °F (36.6 °C) (Temporal)   Resp 18   Ht 157.5 cm (5' 2\")   Wt 125.2 kg   SpO2 97%   BMI 50.48 kg/m²         Physical Exam    Adult physical exam:     VS: Vital signs reviewed. O2 saturation within normal limits for this patient     General: Patient is awake and alert, oriented to person, place and time. Not in acute distress.      HEENT: Head is normocephalic atraumatic. Pupils reactive bilaterally.  EOMs intact.  No facial droop or slurred speech.  No oral pallor. Mucous membranes moist.       Lungs: No respiratory distress noted    Extremities: No edema.  Pulses 2+ extremities.    Brisk capillary refill noted.      Skin: Normal skin turgor, patient has a 1 and half centimeter area of erythema with a small abscess noted to the right lateral lower leg.  There is no significant induration, no areas of fluctuance.  There is no warmth to touch.    CNS: Moves all 4 extremities.  Interacts appropriately.  No tremor.  No gait abnormality        ED Course     Labs Reviewed   POCT GLUCOSE - Abnormal; Notable for the following components:       Result Value    POC Glucose 214 (*)     All other components within normal limits          I have personally  reviewed available prior medical records for any recent pertinent discharge summaries/testing. Patient/family updated on results and plan, a verbalized understanding and agreement with the plan.  I explained to the patient that emergent conditions may arise and to go to the ER for new, worsening or any persistent conditions. I've explained the importance of taking all medicatons as prescribed, follow up, and return precuations,  All questions answered.    Please note that this report has been produced using speech recognition software and may contain errors related to that system including, but not limited to, errors in grammar, punctuation, and spelling, as well as words and phrases that possibly may have been recognized inappropriately.  If there are any questions or concerns, contact the dictating provider for clarification.         MDM      Patient presents with skin infection.  Area is erythematous, warm, no areas of fluctuance to indicate drainable abscess.  Patient is afebrile, nontoxic and does not meet SIRS criteria.  No significant edema. No history of trauma to the area.  There are no areas of fluctuance.  No drainage.  Patient's blood sugar is 214 today.  She does report that she was eating a bit of eat a cookie prior to arrival presentation consistent with cellulitis. Counseled on local wound care.  Prescription given for antibiotics.   Recommend close follow-up with PCP.  Return to ED precautions discussed with the patient.                                     Medical Decision Making      Disposition and Plan     Clinical Impression:  1. Cellulitis of right lower extremity         Disposition:  Discharge  4/9/2024  3:30 pm    Follow-up:  Apolonia Pickard MD  [address] [facility] [address]  [phone]                Medications Prescribed:  Current Discharge Medication List        START taking these medications    Details   cephalexin 500 MG Oral Cap Take 1 capsule (500 mg total) by mouth 2 (two) times daily for 7 days.  Qty: 14 capsule, Refills: 0

## 2024-04-09 NOTE — ED INITIAL ASSESSMENT (HPI)
Pt sts painful sore to right upper outer lower leg for the past 5 days after pulling out an ingrown hair. . Denies drng.

## 2024-04-15 RX ORDER — METFORMIN HYDROCHLORIDE 500 MG/1
500 TABLET, EXTENDED RELEASE ORAL
Qty: 90 TABLET | Refills: 3 | Status: SHIPPED | OUTPATIENT
Start: 2024-04-15

## 2024-04-15 NOTE — TELEPHONE ENCOUNTER
Last refill: 04/24/23  qtY: 90  W/ 3 refills  Last ov: 03/25/24    Requested Prescriptions     Pending Prescriptions Disp Refills    metFORMIN  MG Oral Tablet 24 Hr 90 tablet 3     Sig: Take 1 tablet (500 mg total) by mouth daily with breakfast.     No future appointments.

## 2024-05-01 ENCOUNTER — TELEPHONE (OUTPATIENT)
Dept: FAMILY MEDICINE CLINIC | Facility: CLINIC | Age: 29
End: 2024-05-01

## 2024-05-01 NOTE — TELEPHONE ENCOUNTER
Patient said that she was told to go up to 0.5mg on the Ozempic, her last dose was today of the 0.5mg.  She said said she was supposed to swith to Trulicity but she has one sample of Ozempic left. Should she finish the Ozempic before changing to Trulicity?

## 2024-06-03 RX ORDER — GLIMEPIRIDE 4 MG/1
4 TABLET ORAL
Qty: 90 TABLET | Refills: 0 | Status: SHIPPED | OUTPATIENT
Start: 2024-06-03

## 2024-06-03 NOTE — TELEPHONE ENCOUNTER
OV 03/25/24  LABS 03/25/24 A1C 10.8    REFILL 03/07/24 #90    Future Appointments   Date Time Provider Department Center   6/26/2024  8:00 PM [facility]     When would Dr. Pickard like A1c recheck?*

## 2024-06-03 NOTE — TELEPHONE ENCOUNTER
She needs to be seen every 3 months until she is under good control. She is due at the end of the month.

## 2024-06-26 ENCOUNTER — OFFICE VISIT (OUTPATIENT)
Dept: SLEEP CENTER | Age: 29
End: 2024-06-26
Attending: FAMILY MEDICINE

## 2024-06-26 DIAGNOSIS — G47.33 OSA (OBSTRUCTIVE SLEEP APNEA): ICD-10-CM

## 2024-06-26 PROCEDURE — 95811 POLYSOM 6/>YRS CPAP 4/> PARM: CPT

## 2024-06-27 ENCOUNTER — PATIENT MESSAGE (OUTPATIENT)
Dept: FAMILY MEDICINE CLINIC | Facility: CLINIC | Age: 29
End: 2024-06-27

## 2024-06-27 RX ORDER — DULAGLUTIDE 3 MG/.5ML
3 INJECTION, SOLUTION SUBCUTANEOUS WEEKLY
Qty: 12 EACH | Refills: 0 | Status: SHIPPED | OUTPATIENT
Start: 2024-06-27 | End: 2024-09-25

## 2024-06-27 RX ORDER — DULAGLUTIDE 1.5 MG/.5ML
1.5 INJECTION, SOLUTION SUBCUTANEOUS
Qty: 2 ML | Refills: 0 | OUTPATIENT
Start: 2024-06-27

## 2024-06-27 NOTE — TELEPHONE ENCOUNTER
Future Appointments   Date Time Provider Department Center   6/29/2024 11:30 AM Apolonia Pickard MD [facility] [place]

## 2024-06-27 NOTE — TELEPHONE ENCOUNTER
Last refill: 04/03/24  Qty: 4 each  W/ 0 refills  Last ov: 03/25/24    Requested Prescriptions     Pending Prescriptions Disp Refills    TRULICITY 1.5 MG/0.5ML Subcutaneous Solution Pen-injector [Pharmacy Med Name: TRULICITY 1.5MG/0.5ML INJ (4 PENS)] 2 mL 0     Sig: ADMINISTER 1.5 MG UNDER THE SKIN 1 TIME A WEEK     Future Appointments   Date Time Provider Department Center   6/29/2024 11:30 AM Apolonia Pickard MD [facility] [place]

## 2024-06-29 ENCOUNTER — OFFICE VISIT (OUTPATIENT)
Dept: FAMILY MEDICINE CLINIC | Facility: CLINIC | Age: 29
End: 2024-06-29

## 2024-06-29 VITALS
RESPIRATION RATE: 22 BRPM | SYSTOLIC BLOOD PRESSURE: 128 MMHG | BODY MASS INDEX: 51 KG/M2 | TEMPERATURE: 98 F | WEIGHT: 276.5 LBS | OXYGEN SATURATION: 99 % | DIASTOLIC BLOOD PRESSURE: 84 MMHG | HEART RATE: 96 BPM

## 2024-06-29 DIAGNOSIS — E66.01 CLASS 2 SEVERE OBESITY DUE TO EXCESS CALORIES WITH SERIOUS COMORBIDITY AND BODY MASS INDEX (BMI) OF 36.0 TO 36.9 IN ADULT (HCC): Primary | ICD-10-CM

## 2024-06-29 DIAGNOSIS — Z30.42 ENCOUNTER FOR SURVEILLANCE OF INJECTABLE CONTRACEPTIVE: ICD-10-CM

## 2024-06-29 LAB
ALBUMIN SERPL-MCNC: 3.7 G/DL (ref 3.4–5)
ALBUMIN/GLOB SERPL: 0.9 {RATIO} (ref 1–2)
ALP LIVER SERPL-CCNC: 109 U/L
ALT SERPL-CCNC: 60 U/L
ANION GAP SERPL CALC-SCNC: 9 MMOL/L (ref 0–18)
AST SERPL-CCNC: 34 U/L (ref 15–37)
BILIRUB SERPL-MCNC: 0.4 MG/DL (ref 0.1–2)
BUN BLD-MCNC: 11 MG/DL (ref 9–23)
CALCIUM BLD-MCNC: 9 MG/DL (ref 8.5–10.1)
CHLORIDE SERPL-SCNC: 106 MMOL/L (ref 98–112)
CHOLEST SERPL-MCNC: 185 MG/DL (ref ?–200)
CO2 SERPL-SCNC: 21 MMOL/L (ref 21–32)
CREAT BLD-MCNC: 0.85 MG/DL
EGFRCR SERPLBLD CKD-EPI 2021: 96 ML/MIN/1.73M2 (ref 60–?)
EST. AVERAGE GLUCOSE BLD GHB EST-MCNC: 163 MG/DL (ref 68–126)
GLOBULIN PLAS-MCNC: 4.2 G/DL (ref 2.8–4.4)
GLUCOSE BLD-MCNC: 150 MG/DL (ref 70–99)
HBA1C MFR BLD: 7.3 % (ref ?–5.7)
HDLC SERPL-MCNC: 39 MG/DL (ref 40–59)
KIT LOT #: NORMAL NUMERIC
LDLC SERPL CALC-MCNC: 121 MG/DL (ref ?–100)
NONHDLC SERPL-MCNC: 146 MG/DL (ref ?–130)
OSMOLALITY SERPL CALC.SUM OF ELEC: 284 MOSM/KG (ref 275–295)
POTASSIUM SERPL-SCNC: 3.8 MMOL/L (ref 3.5–5.1)
PREGNANCY TEST, URINE: NEGATIVE
PROT SERPL-MCNC: 7.9 G/DL (ref 6.4–8.2)
SODIUM SERPL-SCNC: 136 MMOL/L (ref 136–145)
TRIGL SERPL-MCNC: 139 MG/DL (ref 30–149)
VLDLC SERPL CALC-MCNC: 25 MG/DL (ref 0–30)

## 2024-06-29 PROCEDURE — 99214 OFFICE O/P EST MOD 30 MIN: CPT | Performed by: INTERNAL MEDICINE

## 2024-06-29 PROCEDURE — 81025 URINE PREGNANCY TEST: CPT | Performed by: INTERNAL MEDICINE

## 2024-06-29 PROCEDURE — 96372 THER/PROPH/DIAG INJ SC/IM: CPT | Performed by: INTERNAL MEDICINE

## 2024-06-29 PROCEDURE — 80061 LIPID PANEL: CPT | Performed by: INTERNAL MEDICINE

## 2024-06-29 PROCEDURE — 80053 COMPREHEN METABOLIC PANEL: CPT | Performed by: INTERNAL MEDICINE

## 2024-06-29 PROCEDURE — 83036 HEMOGLOBIN GLYCOSYLATED A1C: CPT | Performed by: INTERNAL MEDICINE

## 2024-06-29 RX ORDER — SEMAGLUTIDE 0.68 MG/ML
0.5 INJECTION, SOLUTION SUBCUTANEOUS WEEKLY
COMMUNITY
End: 2024-06-29

## 2024-06-29 RX ORDER — MEDROXYPROGESTERONE ACETATE 150 MG/ML
150 INJECTION, SUSPENSION INTRAMUSCULAR ONCE
Status: COMPLETED | OUTPATIENT
Start: 2024-06-29 | End: 2024-06-29

## 2024-06-29 RX ADMIN — MEDROXYPROGESTERONE ACETATE 150 MG: 150 INJECTION, SUSPENSION INTRAMUSCULAR at 10:18:00

## 2024-06-29 NOTE — PROGRESS NOTES
HPI:   Toyin Villegas is a 28 year old female who presents for recheck of her diabetes. Patient’s FBS have been unknown. Last visit with ophthalmologist was last year.  Pt has been checking her feet on a regular basis. Pt denies any tingling of the feet. Pt had anxiety and depression. Pt complains of inability to lose weight, she has been using Trulicity.    Wt Readings from Last 6 Encounters:   06/29/24 276 lb 8 oz (125.4 kg)   04/09/24 276 lb (125.2 kg)   03/25/24 276 lb (125.2 kg)   08/30/23 277 lb (125.6 kg)   06/26/23 269 lb (122 kg)   05/09/23 274 lb 9.6 oz (124.6 kg)     Body mass index is 50.57 kg/m².     Lab Results   Component Value Date    A1C 10.8 (H) 03/25/2024    A1C 10.6 (H) 08/30/2023    A1C 8.8 (H) 02/13/2023     Lab Results   Component Value Date    CHOLEST 182 08/30/2023    CHOLEST 172 02/13/2023    CHOLEST 169 08/29/2022     Lab Results   Component Value Date    HDL 38 (L) 08/30/2023    HDL 41 02/13/2023    HDL 36 (L) 08/29/2022     Lab Results   Component Value Date     (H) 08/30/2023    LDL 95 02/13/2023    LDL 95 08/29/2022     Lab Results   Component Value Date    TRIG 252 (H) 08/30/2023    TRIG 214 (H) 02/13/2023    TRIG 220 (H) 08/29/2022     Lab Results   Component Value Date    AST 54 (H) 03/25/2024    AST 58 (H) 08/30/2023    AST 89 (H) 02/13/2023     Lab Results   Component Value Date    ALT 97 (H) 03/25/2024     (H) 08/30/2023     (H) 02/13/2023     Malb/Cre Calc   Date Value Ref Range Status   03/25/2024 51.6 (H) <=30.0 ug/mg Final     Comment:     <30 ug/mg creatinine       Normal     ug/mg creatinine   Microalbuminuria   >300 ug/mg creatinine      Albuminuria       05/04/2022 207.9 (H) <=30.0 ug/mg Final     Comment:     <30 ug/mg creatinine       Normal     ug/mg creatinine   Microalbuminuria   >300 ug/mg creatinine      Albuminuria       12/28/2020 30.5 (H) <=30.0 ug/mg Final     Comment:     <30 ug/mg creatinine       Normal     ug/mg  creatinine   Microalbuminuria   >300 ug/mg creatinine      Albuminuria           Current Outpatient Medications   Medication Sig Dispense Refill    semaglutide (OZEMPIC, 0.25 OR 0.5 MG/DOSE,) 2 MG/3ML Subcutaneous Solution Pen-injector Inject 0.5 mg into the skin once a week.      Dulaglutide (TRULICITY) 3 MG/0.5ML Subcutaneous Solution Pen-injector Inject 3 mg into the skin once a week. 12 each 0    GLIMEPIRIDE 4 MG Oral Tab TAKE 1 TABLET(4 MG) BY MOUTH BEFORE BREAKFAST 90 tablet 0    metFORMIN  MG Oral Tablet 24 Hr Take 1 tablet (500 mg total) by mouth daily with breakfast. 90 tablet 3    medroxyPROGESTERone 150 MG/ML Intramuscular Suspension Inject 1 mL (150 mg total) into the muscle every 3 (three) months. 1 mL 0    butalbital-acetaminophen-caffeine -40 MG Oral Tab Take 1 tablet by mouth every 4 (four) hours as needed for Pain.      ONETOUCH DELICA LANCETS 33G Does not apply Misc Test twice daily 1 Box 3      Past Medical History:    ADD (attention deficit disorder with hyperactivity)    Depression    Diabetes (HCC)    Fatty liver    Migraines    Obesity, unspecified    PCOS (polycystic ovarian syndrome)      History reviewed. No pertinent surgical history.   Social History:   Social History     Socioeconomic History    Marital status: Single   Tobacco Use    Smoking status: Never    Smokeless tobacco: Never   Vaping Use    Vaping status: Never Used   Substance and Sexual Activity    Alcohol use: Not Currently     Alcohol/week: 1.0 standard drink of alcohol     Types: 1 Glasses of wine per week     Comment: rare- sangria    Drug use: No   Other Topics Concern    Caffeine Concern Yes     Comment: soda     Social Determinants of Health     Financial Resource Strain: Medium Risk (5/15/2024)    Received from [facility]    Overall Financial Resource Strain (CARDIA)     Difficulty of Paying Living Expenses: Somewhat hard   Food Insecurity: Food Insecurity Present  (5/15/2024)    Received from [facility]    Hunger Vital Sign     Worried About Running Out of Food in the Last Year: Sometimes true     Ran Out of Food in the Last Year: Sometimes true   Transportation Needs: No Transportation Needs (5/15/2024)    [facility] from [facility]    PRAPARE - Transportation     Lack of Transportation (Medical): No     Lack of Transportation (Non-Medical): No   Physical Activity: Inactive (5/15/2024)    Received from [facility]    Exercise Vital Sign     Days of Exercise per Week: 0 days     Minutes of Exercise per Session: 0 min   Stress: Stress Concern Present (5/15/2024)    Received from [facility]    [employer] [place] [employer] - Occupational Stress Questionnaire     Feeling of Stress : To some extent   Social Connections: Socially Isolated (5/15/2024)    Received from [facility]    Social Connection and Isolation Panel [NHANES]     Frequency of Communication with Friends and Family: Once a week     Frequency of Social Gatherings with Friends and Family: Once a week     Attends [employer] Services: Never     Active Member of Clubs or Organizations: Yes     Attends Club or Organization Meetings: Never     Marital Status: Never    Housing Stability: Low Risk  (5/15/2024)    Received from [facility]    Housing Stability Vital Sign     Unable to Pay for Housing in the Last Year: No     Number of Places Lived in the Last Year: 1     Unstable Housing in the Last Year: No     Exercise: none.  Diet: doesn't watch     REVIEW OF SYSTEMS:   GENERAL HEALTH: feels well otherwise  SKIN: denies any unusual skin lesions or rashes  RESPIRATORY: denies shortness of breath with exertion  CARDIOVASCULAR: denies chest pain on exertion  GI: denies abdominal pain and denies  heartburn  NEURO: denies headaches    EXAM:   /84   Pulse 96   Temp 98 °F (36.7 °C) (Temporal)   Resp 22   Wt 276 lb 8 oz (125.4 kg)   SpO2 99%   BMI 50.57 kg/m²   GENERAL: well developed, well nourished,in no apparent distress  SKIN: no rashes,no suspicious lesions  NECK: supple,no adenopathy,no bruits  LUNGS: clear to auscultation  CARDIO: RRR without murmur  GI: good BS's,no masses, HSM or tenderness  EXTREMITIES: no cyanosis, clubbing or edema  Bilateral barefoot skin diabetic exam is normal, visualized feet and the appearance is normal.  Bilateral monofilament/sensation of both feet is normal.  Pulsation pedal pulse exam of both lower legs/feet is normal as well.  NEURO: sensation is intact to monofilament     ASSESSMENT AND PLAN:   Toyin Villegas is a 28 year old female who presents for a recheck of her diabetes. Diabetic control is unknown.  Recommendations are: continue present meds, check HgbA1C, check fasting lipids and CMP, lose wgt with carbohydrate controlled diet and exercise, refer to Ophthamology, check feet daily.  The patient indicates understanding of these issues and agrees to the plan.  The patient is asked to return in 3 months.

## 2024-07-01 ENCOUNTER — TELEPHONE (OUTPATIENT)
Dept: FAMILY MEDICINE CLINIC | Facility: CLINIC | Age: 29
End: 2024-07-01

## 2024-07-01 NOTE — TELEPHONE ENCOUNTER
Dulaglutide (TRULICITY) 3 MG/0.5ML Subcutaneous Solution Pen-injector still out of stock and is completely out and starting to get worried, please advise

## 2024-07-02 ENCOUNTER — TELEPHONE (OUTPATIENT)
Dept: FAMILY MEDICINE CLINIC | Facility: CLINIC | Age: 29
End: 2024-07-02

## 2024-07-02 RX ORDER — DULAGLUTIDE 3 MG/.5ML
3 INJECTION, SOLUTION SUBCUTANEOUS WEEKLY
Qty: 12 EACH | Refills: 0 | Status: SHIPPED | OUTPATIENT
Start: 2024-07-02 | End: 2024-09-30

## 2024-07-02 RX ORDER — DULAGLUTIDE 3 MG/.5ML
3 INJECTION, SOLUTION SUBCUTANEOUS WEEKLY
Qty: 12 EACH | Refills: 0 | Status: SHIPPED | OUTPATIENT
Start: 2024-07-02 | End: 2024-07-02

## 2024-07-03 RX ORDER — BUTALBITAL, ACETAMINOPHEN AND CAFFEINE 50; 325; 40 MG/1; MG/1; MG/1
1 TABLET ORAL EVERY 4 HOURS PRN
Refills: 0 | OUTPATIENT
Start: 2024-07-03

## 2024-07-03 NOTE — TELEPHONE ENCOUNTER
butalbital-acetaminophen-caffeine -40 MG Oral Tab     Controlled Substance Medication [id number]/03/2024 03:39 PM    This medication is a controlled substance - forward to provider to refill      Last office visit: 6/29/24  No future appointments.  Last filled:  11/15/22  #30  Last labs:  6/29/24

## 2024-07-05 ENCOUNTER — TELEPHONE (OUTPATIENT)
Dept: FAMILY MEDICINE CLINIC | Facility: CLINIC | Age: 29
End: 2024-07-05

## 2024-07-05 NOTE — TELEPHONE ENCOUNTER
This nurse spoke with Fela Cordoba who states this is normal, especially after doubling the dose. It takes a couple of weeks for the body to adjust. Patient can continue to utilize the anti nausea medication and also tums. If she has persistent vomiting, then we need to know. She verbalized understanding.

## 2024-07-05 NOTE — TELEPHONE ENCOUNTER
Spoke with patient who states she recently upped her Trulicity dose from 1.5 to 3. She states she was told to call back if she has nausea. She states she has had nausea x 3 days. She did have a migraine on Wednesday, so she chalked it up to that, but the nausea is still there today. She does have anti nausea medication. She is wondering if this is normal? How long does the nausea last? Do changes need to be made? Please advise.

## 2024-07-05 NOTE — TELEPHONE ENCOUNTER
Appt tomorrow will address then.     PATIENT CALLING- COMPLAINS OF NAUSEA WITH TRULICITY- STARTED NEW DOSE, 3 MG, ON WEDNESDAY. PATIENT WOULD LIKE TO DISCUSS.

## 2024-07-17 ENCOUNTER — TELEPHONE (OUTPATIENT)
Dept: FAMILY MEDICINE CLINIC | Facility: CLINIC | Age: 29
End: 2024-07-17

## 2024-07-17 RX ORDER — DULAGLUTIDE 3 MG/.5ML
3 INJECTION, SOLUTION SUBCUTANEOUS WEEKLY
Qty: 12 EACH | Refills: 0 | Status: SHIPPED | OUTPATIENT
Start: 2024-07-17 | End: 2024-10-15

## 2024-07-17 NOTE — TELEPHONE ENCOUNTER
The dose is right, the goal is to promote weight loss and get A1C under 6..0%, last A1c was 7.3%  6/29/2024

## 2024-07-17 NOTE — TELEPHONE ENCOUNTER
Patient needs refill, Dulaglutide (TRULICITY) 3 MG/0.5ML Subcutaneous Solution Pen-injector, [facility].

## 2024-07-22 ENCOUNTER — TELEPHONE (OUTPATIENT)
Dept: FAMILY MEDICINE CLINIC | Facility: CLINIC | Age: 29
End: 2024-07-22

## 2024-07-22 RX ORDER — PEN NEEDLE, DIABETIC 30 GX3/16"
1 NEEDLE, DISPOSABLE MISCELLANEOUS WEEKLY
Qty: 50 EACH | Refills: 0 | Status: SHIPPED | OUTPATIENT
Start: 2024-07-22

## 2024-07-22 NOTE — TELEPHONE ENCOUNTER
The pharmacist states that the earliest patient can  the Trulicity is tomorrow, there are not pen needles on file. Script sent for pen needles 32G. V.O. Dr Pickard/Alejandrina KOHLI

## 2024-07-22 NOTE — TELEPHONE ENCOUNTER
Patient advised, she asked if Dr Pickard incrossed the dose, advised yes she should be taking 3mg weekly. V.O. /Alejandrina KOHLI

## 2024-07-30 ENCOUNTER — TELEPHONE (OUTPATIENT)
Dept: FAMILY MEDICINE CLINIC | Facility: CLINIC | Age: 29
End: 2024-07-30

## 2024-07-30 RX ORDER — DULAGLUTIDE 3 MG/.5ML
3 INJECTION, SOLUTION SUBCUTANEOUS WEEKLY
Qty: 12 EACH | Refills: 0 | Status: SHIPPED | OUTPATIENT
Start: 2024-07-30 | End: 2024-10-28

## 2024-07-30 RX ORDER — PEN NEEDLE, DIABETIC 30 GX3/16"
1 NEEDLE, DISPOSABLE MISCELLANEOUS WEEKLY
Qty: 50 EACH | Refills: 0 | Status: SHIPPED | OUTPATIENT
Start: 2024-07-30 | End: 2024-07-30 | Stop reason: CLARIF

## 2024-07-30 NOTE — TELEPHONE ENCOUNTER
Patient calling stating that [facility] did not receive the Truclicity pen, only the pen needles.  Truclicity was sent on 7/17 to [facility] in [place].  Patient states that she had this transferred to the [facility].  Advised that scripts will be sent to the [facility].     Called pharmacy to verify that scripts were received.  Trulicity is out of stock currently.  Patient will be notified when it is received.  Detailed message left on identified VM.

## 2024-07-30 NOTE — PROGRESS NOTES
Spoke w/patient. She was questioning why she was prescribed pen needles when Trulicity is a box of 4 single injection pens with it's own needle on each. Explained it was likely a mistake and script for them was discontinued. Patient had not picked up the script so instructed her not to pick it up. She asked what to do with the pens after injection? Instructed her that an empty laundry detergent bottle works well or she could ask the pharmacist if she can purchase a sharps container. Patient/caregiver verbalized agreement and understanding.

## 2024-08-29 DIAGNOSIS — Z00.00 WELL ADULT EXAM: ICD-10-CM

## 2024-08-29 RX ORDER — MEDROXYPROGESTERONE ACETATE 150 MG/ML
150 INJECTION, SUSPENSION INTRAMUSCULAR
Qty: 1 ML | Refills: 0 | Status: SHIPPED | OUTPATIENT
Start: 2024-08-29

## 2024-08-29 RX ORDER — BUTALBITAL, ACETAMINOPHEN AND CAFFEINE 50; 325; 40 MG/1; MG/1; MG/1
1 TABLET ORAL EVERY 4 HOURS PRN
Refills: 0 | OUTPATIENT
Start: 2024-08-29

## 2024-08-29 NOTE — TELEPHONE ENCOUNTER
Last Ov w/Dr Pickard was 6/29/24  Last Depo was given 6/29/24  Last refill of Butalbital was 10/31/22?

## 2024-08-29 NOTE — TELEPHONE ENCOUNTER
Refill med     butalbital-acetaminophen-caffeine -40 MG Oral Tab  - henrry briseno    medroxyPROGESTERone 150 MG/ML Intramuscular Suspension Patient will be due come end of sept - please send to pharm so it will be ready.

## 2024-09-06 RX ORDER — GLIMEPIRIDE 4 MG/1
4 TABLET ORAL
Qty: 30 TABLET | Refills: 0 | Status: SHIPPED | OUTPATIENT
Start: 2024-09-06 | End: 2024-09-09

## 2024-09-06 NOTE — TELEPHONE ENCOUNTER
GLIMEPIRIDE 4 MG Oral Tab     Diabetes Medication Protocol [id number]/06/2024 09:02 AM   Protocol Details Last A1C < 7.5 and within past 6 months    In person appointment or virtual visit in the past 6 mos or appointment in next 3 mos    Microalbumin procedure in past 12 months or taking ACE/ARB    EGFRCR or GFRNAA > 50    GFR in the past 12 months      Last office visit:  3/25/24    No future appointments.  Last filled:  6/3/24  #90   Last labs:  6/29/24  A1C: 7.3 eGFR-Cr: 96    8/30/23    [employer] message sent to schedule a physical and 30 days of medication sent

## 2024-09-09 ENCOUNTER — TELEPHONE (OUTPATIENT)
Dept: FAMILY MEDICINE CLINIC | Facility: CLINIC | Age: 29
End: 2024-09-09

## 2024-09-09 RX ORDER — GLIMEPIRIDE 4 MG/1
4 TABLET ORAL
Qty: 30 TABLET | Refills: 0 | Status: SHIPPED | OUTPATIENT
Start: 2024-09-09

## 2024-09-09 NOTE — TELEPHONE ENCOUNTER
PATIENT WILL BE MOVING TO A RESIDENCE 10/1, WILL NEED PHYSICAL PRIOR TO THIS, CAN DR BREWER SQUEEZE HER IN OR SHOULD PATIENT SEE SOMEONE ELSE? CALL MOM BACK

## 2024-09-09 NOTE — TELEPHONE ENCOUNTER
Appointment scheduled with Dr Pickard on 9/18/24 at 145pm. She will also need the Depo Provera injection.

## 2024-09-18 ENCOUNTER — OFFICE VISIT (OUTPATIENT)
Dept: FAMILY MEDICINE CLINIC | Facility: CLINIC | Age: 29
End: 2024-09-18
Payer: MEDICAID

## 2024-09-18 ENCOUNTER — LABORATORY ENCOUNTER (OUTPATIENT)
Dept: LAB | Age: 29
End: 2024-09-18
Attending: INTERNAL MEDICINE
Payer: MEDICAID

## 2024-09-18 VITALS
TEMPERATURE: 98 F | OXYGEN SATURATION: 99 % | BODY MASS INDEX: 52.44 KG/M2 | WEIGHT: 285 LBS | HEIGHT: 62 IN | HEART RATE: 92 BPM | RESPIRATION RATE: 18 BRPM | DIASTOLIC BLOOD PRESSURE: 78 MMHG | SYSTOLIC BLOOD PRESSURE: 122 MMHG

## 2024-09-18 DIAGNOSIS — E66.01 MORBID OBESITY (HCC): ICD-10-CM

## 2024-09-18 DIAGNOSIS — E55.9 VITAMIN D DEFICIENCY: ICD-10-CM

## 2024-09-18 DIAGNOSIS — E28.2 PCO (POLYCYSTIC OVARIES): ICD-10-CM

## 2024-09-18 DIAGNOSIS — F32.0 CURRENT MILD EPISODE OF MAJOR DEPRESSIVE DISORDER, UNSPECIFIED WHETHER RECURRENT (HCC): ICD-10-CM

## 2024-09-18 DIAGNOSIS — G47.33 OSA ON CPAP: ICD-10-CM

## 2024-09-18 DIAGNOSIS — E55.9 VITAMIN D DEFICIENCY: Primary | ICD-10-CM

## 2024-09-18 DIAGNOSIS — Z30.42 ENCOUNTER FOR SURVEILLANCE OF INJECTABLE CONTRACEPTIVE: ICD-10-CM

## 2024-09-18 DIAGNOSIS — E66.01 CLASS 2 SEVERE OBESITY DUE TO EXCESS CALORIES WITH SERIOUS COMORBIDITY AND BODY MASS INDEX (BMI) OF 36.0 TO 36.9 IN ADULT (HCC): ICD-10-CM

## 2024-09-18 DIAGNOSIS — Z00.00 WELL ADULT EXAM: ICD-10-CM

## 2024-09-18 LAB
BASOPHILS # BLD AUTO: 0.06 X10(3) UL (ref 0–0.2)
BASOPHILS NFR BLD AUTO: 0.5 %
EOSINOPHIL # BLD AUTO: 0.17 X10(3) UL (ref 0–0.7)
EOSINOPHIL NFR BLD AUTO: 1.5 %
ERYTHROCYTE [DISTWIDTH] IN BLOOD BY AUTOMATED COUNT: 13 %
EST. AVERAGE GLUCOSE BLD GHB EST-MCNC: 183 MG/DL (ref 68–126)
HBA1C MFR BLD: 8 % (ref ?–5.7)
HCT VFR BLD AUTO: 38.6 %
HGB BLD-MCNC: 12.9 G/DL
IMM GRANULOCYTES # BLD AUTO: 0.12 X10(3) UL (ref 0–1)
IMM GRANULOCYTES NFR BLD: 1.1 %
LYMPHOCYTES # BLD AUTO: 2.64 X10(3) UL (ref 1–4)
LYMPHOCYTES NFR BLD AUTO: 23.1 %
MCH RBC QN AUTO: 27 PG (ref 26–34)
MCHC RBC AUTO-ENTMCNC: 33.4 G/DL (ref 31–37)
MCV RBC AUTO: 80.8 FL
MONOCYTES # BLD AUTO: 0.49 X10(3) UL (ref 0.1–1)
MONOCYTES NFR BLD AUTO: 4.3 %
NEUTROPHILS # BLD AUTO: 7.93 X10 (3) UL (ref 1.5–7.7)
NEUTROPHILS # BLD AUTO: 7.93 X10(3) UL (ref 1.5–7.7)
NEUTROPHILS NFR BLD AUTO: 69.5 %
PLATELET # BLD AUTO: 430 10(3)UL (ref 150–450)
RBC # BLD AUTO: 4.78 X10(6)UL
WBC # BLD AUTO: 11.4 X10(3) UL (ref 4–11)

## 2024-09-18 PROCEDURE — 80053 COMPREHEN METABOLIC PANEL: CPT

## 2024-09-18 PROCEDURE — 36415 COLL VENOUS BLD VENIPUNCTURE: CPT

## 2024-09-18 PROCEDURE — 96372 THER/PROPH/DIAG INJ SC/IM: CPT | Performed by: INTERNAL MEDICINE

## 2024-09-18 PROCEDURE — 82306 VITAMIN D 25 HYDROXY: CPT

## 2024-09-18 PROCEDURE — 83036 HEMOGLOBIN GLYCOSYLATED A1C: CPT

## 2024-09-18 PROCEDURE — 99395 PREV VISIT EST AGE 18-39: CPT | Performed by: INTERNAL MEDICINE

## 2024-09-18 PROCEDURE — 85025 COMPLETE CBC W/AUTO DIFF WBC: CPT

## 2024-09-18 RX ORDER — MEDROXYPROGESTERONE ACETATE 150 MG/ML
150 INJECTION, SUSPENSION INTRAMUSCULAR ONCE
Status: COMPLETED | OUTPATIENT
Start: 2024-09-18 | End: 2024-09-18

## 2024-09-18 RX ORDER — MEDROXYPROGESTERONE ACETATE 150 MG/ML
150 INJECTION, SUSPENSION INTRAMUSCULAR
Qty: 1 ML | Refills: 0 | Status: SHIPPED | OUTPATIENT
Start: 2024-09-18

## 2024-09-18 RX ADMIN — MEDROXYPROGESTERONE ACETATE 150 MG: 150 INJECTION, SUSPENSION INTRAMUSCULAR at 14:34:00

## 2024-09-18 NOTE — PROGRESS NOTES
HPI:   Toyin Villegas is a 28 year old female who presents for a complete physical exam. Symptoms:  flaky scalp, DM 2., VIT D deficiency, depression/anxiety, PCOS, fatty liver . Patient is moving to a community for young people who struggle with social anxiety.  She is not currently un medicated for anxiety or depression; but will start seeing a counselor Oct 1 after she moves. She is using meds as ordered.     Immunization History   Administered Date(s) Administered    Covid-19 Vaccine Pfizer 30 mcg/0.3 ml 02/06/2021, 03/03/2021    DTAP 01/16/1996, 03/19/1996, 06/10/1996, 01/21/1997, 08/07/2000    FLULAVAL 6 months & older 0.5 ml Prefilled syringe (62227) 10/29/2019    FLUZONE 6 months and older PFS 0.5 ml (39983) 10/28/2014    Fluarix 6 Months And Older 0.5 ml prefilled syringe (16135) 09/24/2020    HEP A,Ped/Adol,(2 Dose) 03/22/2006, 07/19/2007    HEP B 06/10/1996, 07/07/1996, 03/11/1997    HIB 01/16/1996, 03/19/1996, 06/10/1996, 01/21/1997    IPV 01/16/1996, 03/19/1996, 03/11/1997, 08/07/2000    Influenza 01/13/2018, 09/06/2020    MMR 01/21/1997, 08/15/2001    TDAP 07/09/2009    Varicella 09/26/1996, 08/04/2008     Wt Readings from Last 6 Encounters:   09/18/24 285 lb (129.3 kg)   06/29/24 276 lb 8 oz (125.4 kg)   04/09/24 276 lb (125.2 kg)   03/25/24 276 lb (125.2 kg)   08/30/23 277 lb (125.6 kg)   06/26/23 269 lb (122 kg)     Body mass index is 52.13 kg/m².     Lab Results   Component Value Date     (H) 06/29/2024     (H) 03/25/2024     (H) 08/30/2023     Lab Results   Component Value Date    CHOLEST 185 06/29/2024    CHOLEST 182 08/30/2023    CHOLEST 172 02/13/2023     Lab Results   Component Value Date    HDL 39 (L) 06/29/2024    HDL 38 (L) 08/30/2023    HDL 41 02/13/2023     Lab Results   Component Value Date     (H) 06/29/2024     (H) 08/30/2023    LDL 95 02/13/2023     Lab Results   Component Value Date    AST 34 06/29/2024    AST 54 (H) 03/25/2024    AST 58 (H)  08/30/2023     Lab Results   Component Value Date    ALT 60 (H) 06/29/2024    ALT 97 (H) 03/25/2024     (H) 08/30/2023       Current Outpatient Medications   Medication Sig Dispense Refill    glimepiride 4 MG Oral Tab Take 1 tablet (4 mg total) by mouth before breakfast. 30 tablet 0    medroxyPROGESTERone 150 MG/ML Intramuscular Suspension Inject 1 mL (150 mg total) into the muscle every 3 (three) months. 1 mL 0    Dulaglutide (TRULICITY) 3 MG/0.5ML Subcutaneous Solution Pen-injector Inject 3 mg into the skin once a week. 12 each 0    metFORMIN  MG Oral Tablet 24 Hr Take 1 tablet (500 mg total) by mouth daily with breakfast. 90 tablet 3    butalbital-acetaminophen-caffeine -40 MG Oral Tab Take 1 tablet by mouth every 4 (four) hours as needed for Pain.      ONETOUCH DELICA LANCETS 33G Does not apply Misc Test twice daily 1 Box 3      Past Medical History:    ADD (attention deficit disorder with hyperactivity)    Depression    Diabetes (HCC)    Fatty liver    Migraines    Obesity, unspecified    PCOS (polycystic ovarian syndrome)      No past surgical history on file.   Family History   Adopted: Yes   Family history unknown: Yes      Social History:   Social History     Socioeconomic History    Marital status: Single   Tobacco Use    Smoking status: Never    Smokeless tobacco: Never   Vaping Use    Vaping status: Never Used   Substance and Sexual Activity    Alcohol use: Not Currently     Alcohol/week: 1.0 standard drink of alcohol     Types: 1 Glasses of wine per week     Comment: rare- sangria    Drug use: No   Other Topics Concern    Caffeine Concern Yes     Comment: soda     Social Determinants of Health     Financial Resource Strain: Medium Risk (5/15/2024)    Received from [facility]    Overall Financial Resource Strain (CARDIA)     Difficulty of Paying Living Expenses: Somewhat hard   Food Insecurity: Food Insecurity Present (5/15/2024)    [facility]    Hunger Vital Sign     Worried About Running Out of Food in the Last Year: Sometimes true     Ran Out of Food in the Last Year: Sometimes true   Transportation Needs: No Transportation Needs (5/15/2024)    Received from [facility]    PRAPARE - Transportation     Lack of Transportation (Medical): No     Lack of Transportation (Non-Medical): No   Physical Activity: Inactive (5/15/2024)    Received from [facility]    Exercise Vital Sign     Days of Exercise per Week: 0 days     Minutes of Exercise per Session: 0 min   Stress: Stress Concern Present (5/15/2024)    Received from [facility]    [employer] [place] [employer] - Occupational Stress Questionnaire     Feeling of Stress : To some extent   Social Connections: Socially Isolated (5/15/2024)    Received from [facility]    Social Connection and Isolation Panel [NHANES]     Frequency of Communication with Friends and Family: Once a week     Frequency of Social Gatherings with Friends and Family: Once a week     Attends Hinduism Services: Never     Active Member of Clubs or Organizations: Yes     Attends Club or Organization Meetings: Never     Marital Status: Never    Housing Stability: Low Risk  (5/15/2024)    Received from [facility]    Housing Stability Vital Sign     Unable to Pay for Housing in the Last Year: No     Number of Places Lived in the Last Year: 1     Unstable Housing in the Last Year: No     Occ: part time at [employer]. : no. Children: no.   Exercise: minimal.  Diet: doesn't watch     REVIEW OF SYSTEMS:   GENERAL: feels well otherwise  SKIN: denies any unusual skin lesions  EYES:denies blurred vision or double vision  HEENT: denies nasal congestion, sinus pain or ST  LUNGS: denies shortness of breath with  exertion  CARDIOVASCULAR: denies chest pain on exertion  GI: denies abdominal pain,denies heartburn  : denies dysuria, vaginal discharge or itching,periods regular   MUSCULOSKELETAL: denies back pain  NEURO: denies headaches  PSYCHE: denies depression or anxiety  HEMATOLOGIC: denies hx of anemia  ENDOCRINE: denies thyroid history  ALL/ASTHMA: denies hx of allergy or asthma    EXAM:   /78   Pulse 92   Temp 97.6 °F (36.4 °C) (Temporal)   Resp 18   Ht 5' 2\" (1.575 m)   Wt 285 lb (129.3 kg)   SpO2 99%   BMI 52.13 kg/m²   Body mass index is 52.13 kg/m².   GENERAL: well developed, well nourished,in no apparent distress  SKIN: no rashes,no suspicious lesions  HEENT: atraumatic, normocephalic,ears and throat are clear  EYES:PERRLA, EOMI, normal optic disk,conjunctiva are clear  NECK: supple,no adenopathy,no bruits  CHEST: no chest tenderness  BREAST: deferred  LUNGS: clear to auscultation  CARDIO: RRR without murmur  GI: good BS's,no masses, HSM or tenderness  :deferred   RECTAL:deferred  MUSCULOSKELETAL: back is not tender,FROM of the back  EXTREMITIES: no cyanosis, clubbing or edema  NEURO: Oriented times three,cranial nerves are intact,motor and sensory are grossly intact    ASSESSMENT AND PLAN:   Toyin Villegas is a 28 year old female who presents for a complete physical exam. NIZORIL antifungal for scalp with ketoconazole, this is OTC. Will check A1C, CMP, and CBC.  Pt' s weight is Body mass index is 52.13 kg/m²., recommended low fat diet and aerobic exercise 30 minutes three times weekly.  The patient indicates understanding of these issues and agrees to the plan.  The patient is asked to return for CPX in 1 year.

## 2024-09-19 LAB
ALBUMIN SERPL-MCNC: 4.4 G/DL (ref 3.2–4.8)
ALBUMIN/GLOB SERPL: 1.4 {RATIO} (ref 1–2)
ALP LIVER SERPL-CCNC: 108 U/L
ALT SERPL-CCNC: 36 U/L
ANION GAP SERPL CALC-SCNC: 10 MMOL/L (ref 0–18)
AST SERPL-CCNC: 26 U/L (ref ?–34)
BILIRUB SERPL-MCNC: 0.4 MG/DL (ref 0.3–1.2)
BUN BLD-MCNC: 10 MG/DL (ref 9–23)
CALCIUM BLD-MCNC: 9.8 MG/DL (ref 8.7–10.4)
CHLORIDE SERPL-SCNC: 107 MMOL/L (ref 98–112)
CO2 SERPL-SCNC: 24 MMOL/L (ref 21–32)
CREAT BLD-MCNC: 0.73 MG/DL
EGFRCR SERPLBLD CKD-EPI 2021: 115 ML/MIN/1.73M2 (ref 60–?)
FASTING STATUS PATIENT QL REPORTED: NO
GLOBULIN PLAS-MCNC: 3.1 G/DL (ref 2–3.5)
GLUCOSE BLD-MCNC: 197 MG/DL (ref 70–99)
OSMOLALITY SERPL CALC.SUM OF ELEC: 297 MOSM/KG (ref 275–295)
POTASSIUM SERPL-SCNC: 4 MMOL/L (ref 3.5–5.1)
PROT SERPL-MCNC: 7.5 G/DL (ref 5.7–8.2)
SODIUM SERPL-SCNC: 141 MMOL/L (ref 136–145)
VIT D+METAB SERPL-MCNC: 29.4 NG/ML (ref 30–100)

## 2024-09-19 RX ORDER — DULAGLUTIDE 4.5 MG/.5ML
4.5 INJECTION, SOLUTION SUBCUTANEOUS WEEKLY
Qty: 12 PEN | Refills: 0 | Status: SHIPPED
Start: 2024-09-19 | End: 2024-12-18

## 2024-09-19 RX ORDER — DULAGLUTIDE 4.5 MG/.5ML
4.5 INJECTION, SOLUTION SUBCUTANEOUS WEEKLY
Qty: 12 PEN | Refills: 0 | Status: SHIPPED
Start: 2024-09-19 | End: 2024-09-19

## 2024-09-19 RX ORDER — ERGOCALCIFEROL 1.25 MG/1
50000 CAPSULE, LIQUID FILLED ORAL WEEKLY
Qty: 12 CAPSULE | Refills: 3 | Status: SHIPPED | OUTPATIENT
Start: 2024-09-19 | End: 2025-08-21

## 2024-09-19 RX ORDER — ERGOCALCIFEROL 1.25 MG/1
50000 CAPSULE, LIQUID FILLED ORAL WEEKLY
Qty: 12 CAPSULE | Refills: 3 | Status: SHIPPED | OUTPATIENT
Start: 2024-09-19 | End: 2024-09-19

## 2024-09-24 ENCOUNTER — TELEPHONE (OUTPATIENT)
Dept: FAMILY MEDICINE CLINIC | Facility: CLINIC | Age: 29
End: 2024-09-24

## 2024-09-24 RX ORDER — DULAGLUTIDE 4.5 MG/.5ML
4.5 INJECTION, SOLUTION SUBCUTANEOUS WEEKLY
Qty: 12 PEN | Refills: 0 | Status: SHIPPED
Start: 2024-09-24 | End: 2024-12-23

## 2024-09-25 ENCOUNTER — TELEPHONE (OUTPATIENT)
Dept: FAMILY MEDICINE CLINIC | Facility: CLINIC | Age: 29
End: 2024-09-25

## 2024-09-25 NOTE — TELEPHONE ENCOUNTER
Patient asked if she can take her Trulicity tonight instead of this morning. She is supposed to increase her dose to 4.5 mg and it is not ready at the pharmacy until later today. The last dose was 1 week ago.

## 2024-11-02 ENCOUNTER — TELEPHONE (OUTPATIENT)
Dept: FAMILY MEDICINE CLINIC | Facility: CLINIC | Age: 29
End: 2024-11-02

## 2024-11-02 ENCOUNTER — OFFICE VISIT (OUTPATIENT)
Dept: FAMILY MEDICINE CLINIC | Facility: CLINIC | Age: 29
End: 2024-11-02
Payer: MEDICAID

## 2024-11-02 ENCOUNTER — HOSPITAL ENCOUNTER (OUTPATIENT)
Facility: HOSPITAL | Age: 29
Setting detail: OBSERVATION
Discharge: HOME | End: 2024-11-04
Attending: EMERGENCY MEDICINE | Admitting: INTERNAL MEDICINE
Payer: MEDICAID

## 2024-11-02 ENCOUNTER — APPOINTMENT (OUTPATIENT)
Dept: CT IMAGING | Age: 29
End: 2024-11-02
Attending: EMERGENCY MEDICINE
Payer: MEDICAID

## 2024-11-02 ENCOUNTER — APPOINTMENT (OUTPATIENT)
Dept: ULTRASOUND IMAGING | Age: 29
End: 2024-11-02
Attending: EMERGENCY MEDICINE
Payer: MEDICAID

## 2024-11-02 VITALS
DIASTOLIC BLOOD PRESSURE: 82 MMHG | OXYGEN SATURATION: 100 % | HEART RATE: 88 BPM | SYSTOLIC BLOOD PRESSURE: 130 MMHG | HEIGHT: 62 IN | BODY MASS INDEX: 51.53 KG/M2 | TEMPERATURE: 98 F | WEIGHT: 280 LBS | RESPIRATION RATE: 18 BRPM

## 2024-11-02 DIAGNOSIS — K81.0 ACUTE CHOLECYSTITIS: Primary | ICD-10-CM

## 2024-11-02 DIAGNOSIS — R10.13 EPIGASTRIC PAIN: Primary | ICD-10-CM

## 2024-11-02 DIAGNOSIS — R11.2 NAUSEA AND VOMITING, UNSPECIFIED VOMITING TYPE: ICD-10-CM

## 2024-11-02 DIAGNOSIS — R19.7 NAUSEA VOMITING AND DIARRHEA: ICD-10-CM

## 2024-11-02 DIAGNOSIS — Z02.9 ADMINISTRATIVE ENCOUNTER: ICD-10-CM

## 2024-11-02 DIAGNOSIS — R11.2 NAUSEA VOMITING AND DIARRHEA: ICD-10-CM

## 2024-11-02 DIAGNOSIS — E11.9 DIABETES MELLITUS WITHOUT COMPLICATION (HCC): ICD-10-CM

## 2024-11-02 DIAGNOSIS — E66.01 MORBID OBESITY (HCC): ICD-10-CM

## 2024-11-02 DIAGNOSIS — Z02.89 ENCOUNTER TO OBTAIN EXCUSE FROM WORK: ICD-10-CM

## 2024-11-02 LAB
ALBUMIN SERPL-MCNC: 3.5 G/DL (ref 3.4–5)
ALBUMIN/GLOB SERPL: 0.8 {RATIO} (ref 1–2)
ALP LIVER SERPL-CCNC: 110 U/L
ALT SERPL-CCNC: 47 U/L
ANION GAP SERPL CALC-SCNC: 5 MMOL/L (ref 0–18)
AST SERPL-CCNC: 25 U/L (ref 15–37)
B-HCG UR QL: NEGATIVE
BASOPHILS # BLD AUTO: 0.06 X10(3) UL (ref 0–0.2)
BASOPHILS NFR BLD AUTO: 0.4 %
BILIRUB SERPL-MCNC: 0.3 MG/DL (ref 0.1–2)
BILIRUB UR QL STRIP.AUTO: NEGATIVE
BUN BLD-MCNC: 8 MG/DL (ref 9–23)
CALCIUM BLD-MCNC: 9.6 MG/DL (ref 8.5–10.1)
CHLORIDE SERPL-SCNC: 109 MMOL/L (ref 98–112)
CLARITY UR REFRACT.AUTO: CLEAR
CO2 SERPL-SCNC: 24 MMOL/L (ref 21–32)
COLOR UR AUTO: YELLOW
CREAT BLD-MCNC: 0.72 MG/DL
EGFRCR SERPLBLD CKD-EPI 2021: 116 ML/MIN/1.73M2 (ref 60–?)
EOSINOPHIL # BLD AUTO: 0.27 X10(3) UL (ref 0–0.7)
EOSINOPHIL NFR BLD AUTO: 2 %
ERYTHROCYTE [DISTWIDTH] IN BLOOD BY AUTOMATED COUNT: 13.2 %
GLOBULIN PLAS-MCNC: 4.5 G/DL (ref 2.8–4.4)
GLUCOSE BLD-MCNC: 111 MG/DL (ref 70–99)
GLUCOSE UR STRIP.AUTO-MCNC: NEGATIVE MG/DL
HCT VFR BLD AUTO: 43.1 %
HGB BLD-MCNC: 14.8 G/DL
IMM GRANULOCYTES # BLD AUTO: 0.09 X10(3) UL (ref 0–1)
IMM GRANULOCYTES NFR BLD: 0.7 %
KETONES UR STRIP.AUTO-MCNC: NEGATIVE MG/DL
LEUKOCYTE ESTERASE UR QL STRIP.AUTO: NEGATIVE
LIPASE SERPL-CCNC: 62 U/L (ref 12–53)
LYMPHOCYTES # BLD AUTO: 3.37 X10(3) UL (ref 1–4)
LYMPHOCYTES NFR BLD AUTO: 24.5 %
MCH RBC QN AUTO: 27.2 PG (ref 26–34)
MCHC RBC AUTO-ENTMCNC: 34.3 G/DL (ref 31–37)
MCV RBC AUTO: 79.2 FL
MONOCYTES # BLD AUTO: 0.86 X10(3) UL (ref 0.1–1)
MONOCYTES NFR BLD AUTO: 6.3 %
NEUTROPHILS # BLD AUTO: 9.09 X10 (3) UL (ref 1.5–7.7)
NEUTROPHILS # BLD AUTO: 9.09 X10(3) UL (ref 1.5–7.7)
NEUTROPHILS NFR BLD AUTO: 66.1 %
NITRITE UR QL STRIP.AUTO: NEGATIVE
OSMOLALITY SERPL CALC.SUM OF ELEC: 285 MOSM/KG (ref 275–295)
PH UR STRIP.AUTO: 7.5 [PH] (ref 5–8)
PLATELET # BLD AUTO: 424 10(3)UL (ref 150–450)
POTASSIUM SERPL-SCNC: 3.8 MMOL/L (ref 3.5–5.1)
PROT SERPL-MCNC: 8 G/DL (ref 6.4–8.2)
PROT UR STRIP.AUTO-MCNC: NEGATIVE MG/DL
RBC # BLD AUTO: 5.44 X10(6)UL
RBC UR QL AUTO: NEGATIVE
SODIUM SERPL-SCNC: 138 MMOL/L (ref 136–145)
SP GR UR STRIP.AUTO: 1.02 (ref 1–1.03)
UROBILINOGEN UR STRIP.AUTO-MCNC: 0.2 MG/DL
WBC # BLD AUTO: 13.7 X10(3) UL (ref 4–11)

## 2024-11-02 PROCEDURE — 74177 CT ABD & PELVIS W/CONTRAST: CPT | Performed by: EMERGENCY MEDICINE

## 2024-11-02 PROCEDURE — 76705 ECHO EXAM OF ABDOMEN: CPT | Performed by: EMERGENCY MEDICINE

## 2024-11-02 NOTE — PROGRESS NOTES
Toyin Villegas is a 29 year old female who presents today c/o 7-8 h/o  Onset vomiting, diarrhea, HA, achy joints without fever.  Onset after influenza vaccination on 10/24/24, reports prior h/o INGRID post influenza vaccination.     C/o 2-4 episodes non-bloody diarrhea, 2 episodes of nonbloody emesis--most recently last night.   (+) nausea, epigastric pain.     Last po intake earlier today, tolerated small amount of toast.     No h/o abd surgeries.      No noted exacerbating/remitting factors.     Urinating normally.     H/o DM, home glucometer fasting 100, 143 post prandial    Denies h/o abdominal surgeries.     No ETOH use.         OBJECTIVE:   /82   Pulse 88   Temp 97.5 °F (36.4 °C)   Resp 18   Ht 5' 2\" (1.575 m)   Wt 280 lb (127 kg)   SpO2 100%   BMI 51.21 kg/m²   General A & O x 3 in nad, flat affect, appropriately dressed/groomed/  HEENT  NCAT, EOMI, sclerae anicteric, ext ears/nares clear, op clear with mmm  RESP CTA [place], no w/r/r.   CV  RRR, no m/g/r.   ABD Obese, (+)BS, epigastric ttp with superficial palpation, mild voluntary guarding, no r/r, no masses appreciated.   MS DAVIDSON, no c/c/e.       ICD-10-CM    1. Epigastric pain  R10.13       2. Nausea vomiting and diarrhea  R11.2     R19.7       3. Encounter to obtain excuse from work  Z02.89       4. Diabetes mellitus without complication (HCC)  E11.9       5. Administrative encounter  Z02.9          DDx including biliary colic, pancreatitis reviewed.  Discussed work up beyond scope of WIC, referred to ED for further evaluation. Pt discharged in stable condition via private car to ED.       Lora Amaya PA-C

## 2024-11-02 NOTE — TELEPHONE ENCOUNTER
Patient states that she has been sick for the last week. She said she got her shot on 10/24/24 and since then she has been vomiting after eating and has had diarrhea. Monday she had a headache. She is feeling warm today. She left work early yesterday and Tuesday. She also missed work yesterday and todaydue to vomiting. She did not test for Covid.

## 2024-11-02 NOTE — ED PROVIDER NOTES
Patient Seen in: [facility] In [place]      History     Chief Complaint   Patient presents with    Abdomen/Flank Pain     Stated Complaint: Abd pain x 5 days- went to [facility] and was sent to ED for further eval    Subjective:     HPI    29-year-old woman with diabetes (metformin, dulaglutide, and glimepiride) who comes in with epigastric pain that started 4 days ago.  It was associated with vomiting.  The pain does not lateralize.  It does not radiate to the back.  No history of gallbladder problems.  No prior abdominal surgery.  No fever.    Objective:   Past Medical History:    ADD (attention deficit disorder with hyperactivity)    Depression    Diabetes (HCC)    Fatty liver    Migraines    Obesity, unspecified    PCOS (polycystic ovarian syndrome)              History reviewed. No pertinent surgical history.             Social History     Socioeconomic History    Marital status: Single   Tobacco Use    Smoking status: Never    Smokeless tobacco: Never   Vaping Use    Vaping status: Never Used   Substance and Sexual Activity    Alcohol use: Not Currently     Alcohol/week: 1.0 standard drink of alcohol     Types: 1 Glasses of wine per week     Comment: rare- sangria    Drug use: No   Other Topics Concern    Caffeine Concern Yes     Comment: soda     Social Drivers of Health     Financial Resource Strain: Medium Risk (5/15/2024)    Received from [facility]    Overall Financial Resource Strain (CARDIA)     Difficulty of Paying Living Expenses: Somewhat hard   Food Insecurity: Food Insecurity Present (5/15/2024)    Received from [facility]    Hunger Vital Sign     Worried About Running Out of Food in the Last Year: Sometimes true     Ran Out of Food in the Last Year: Sometimes true   Transportation Needs: No Transportation Needs (5/15/2024)    Received from [facility]    PRAPARE - Transportation      Lack of Transportation (Medical): No     Lack of Transportation (Non-Medical): No   Physical Activity: Inactive (5/15/2024)    Received from [facility]    Exercise Vital Sign     Days of Exercise per Week: 0 days     Minutes of Exercise per Session: 0 min   Stress: Stress Concern Present (5/15/2024)    Received from [facility]    [employer] [place] [employer] - Occupational Stress Questionnaire     Feeling of Stress : To some extent   Social Connections: Socially Isolated (5/15/2024)    Received from [facility]    Social Connection and Isolation Panel [NHANES]     Frequency of Communication with Friends and Family: Once a week     Frequency of Social Gatherings with Friends and Family: Once a week     Attends Rastafarian Services: Never     Active Member of Clubs or Organizations: Yes     Attends Club or Organization Meetings: Never     Marital Status: Never    Housing Stability: Low Risk  (5/15/2024)    Received from [facility]    Housing Stability Vital Sign     Unable to Pay for Housing in the Last Year: No     Number of Places Lived in the Last Year: 1     Unstable Housing in the Last Year: No              Review of Systems    Positive for stated complaint: Abd pain x 5 days- went to [facility] and was sent to ED for further eval  Other systems are as noted in HPI.  Constitutional and vital signs reviewed.      All other systems reviewed and negative except as noted above.    Physical Exam     ED Triage Vitals [11/02/24 1737]   /81   Pulse 91   Resp 18   Temp 97.9 °F (36.6 °C)   Temp src    SpO2 99 %   O2 Device None (Room air)       Current:/60   Pulse 90   Temp 97.9 °F (36.6 °C)   Resp 18   Ht 158.8 cm (5' 2.5\")   Wt 127 kg   SpO2 100%   BMI 50.40 kg/m²     General:  Vitals as listed.  No acute distress   HEENT: Sclerae anicteric.   Conjunctivae show no pallor.  Oropharynx clear, mucous membranes moist   Lungs: good air exchange    Abdomen: BMI 50.  Epigastric tenderness.  No abdominal masses.  No peritoneal signs   Extremities: no edema, normal peripheral pulses   Neuro: Alert oriented and nonfocal      ED Course     Labs Reviewed   COMP METABOLIC PANEL (14) - Abnormal; Notable for the following components:       Result Value    Glucose 111 (*)     BUN 8 (*)     Alkaline Phosphatase 110 (*)     Globulin  4.5 (*)     A/G Ratio 0.8 (*)     All other components within normal limits   LIPASE - Abnormal; Notable for the following components:    Lipase 62 (*)     All other components within normal limits   CBC WITH DIFFERENTIAL WITH PLATELET - Abnormal; Notable for the following components:    WBC 13.7 (*)     RBC 5.44 (*)     MCV 79.2 (*)     Neutrophil Absolute Prelim 9.09 (*)     Neutrophil Absolute 9.09 (*)     All other components within normal limits   POCT PREGNANCY URINE - Normal   SCAN SLIDE   URINALYSIS, ROUTINE   RAINBOW DRAW LAVENDER   RAINBOW DRAW LIGHT GREEN     CT ABDOMEN+PELVIS(CONTRAST ONLY)(CPT=74177)    Result Date: 11/2/2024  CONCLUSION:  1. Cholelithiasis. 2. Non-obstructing left renal calcification. 3. Copious amount of stool throughout the colon. 4. Several fluid-filled small bowel loops are present can be seen in enteritis, correlate clinically.   LOCATION:  [facility]   Dictated by (CST): Nell Espinosa MD on 11/02/2024 at 7:28 PM     Finalized by (CST): Nell Espinosa MD on 11/02/2024 at 7:30 PM        ED COURSE and MDM       Differential diagnosis before testing included cholecystitis versus ruptured abdominal aortic aneurysm, a medical condition that poses a threat to life.    I reviewed prior external notes including evaluation today at the walk-in clinic by DINORAH Amaya.  Since she was having abdominal pain and there were limited resources at the walk-in clinic, she was sent to the emergency department for further  evaluation.    Patient hydrated normal saline.  She declined pain medications on arrival.  She is NPO.    Patient given Zosyn.    Dr. Juares, general surgery, notified.    [facility], notified.    I have discussed with the patient the results of testing, differential diagnosis, and treatment plan. They expressed clear understanding of these instructions and agrees to the plan provided.    Disposition and Plan     Clinical Impression:  1. Acute cholecystitis    2. Morbid obesity (HCC)    3. Nausea and vomiting, unspecified vomiting type         Disposition:  Admit  11/2/2024  7:47 pm    Follow-up:  No follow-up provider specified.      Medications Prescribed:  Current Discharge Medication List

## 2024-11-03 PROBLEM — R11.2 NAUSEA AND VOMITING, UNSPECIFIED VOMITING TYPE: Status: ACTIVE | Noted: 2024-11-03

## 2024-11-03 PROBLEM — E66.01 MORBID OBESITY (HCC): Status: ACTIVE | Noted: 2024-11-03

## 2024-11-03 LAB
ALBUMIN SERPL-MCNC: 4.2 G/DL (ref 3.2–4.8)
ALBUMIN/GLOB SERPL: 1.6 {RATIO} (ref 1–2)
ALP LIVER SERPL-CCNC: 100 U/L
ALT SERPL-CCNC: 35 U/L
ANION GAP SERPL CALC-SCNC: 10 MMOL/L (ref 0–18)
AST SERPL-CCNC: 27 U/L (ref ?–34)
BASOPHILS # BLD AUTO: 0.03 X10(3) UL (ref 0–0.2)
BASOPHILS NFR BLD AUTO: 0.2 %
BILIRUB SERPL-MCNC: 0.6 MG/DL (ref 0.3–1.2)
BUN BLD-MCNC: 6 MG/DL (ref 9–23)
CALCIUM BLD-MCNC: 9 MG/DL (ref 8.7–10.4)
CHLORIDE SERPL-SCNC: 110 MMOL/L (ref 98–112)
CO2 SERPL-SCNC: 21 MMOL/L (ref 21–32)
CREAT BLD-MCNC: 0.68 MG/DL
EGFRCR SERPLBLD CKD-EPI 2021: 121 ML/MIN/1.73M2 (ref 60–?)
EOSINOPHIL # BLD AUTO: 0.23 X10(3) UL (ref 0–0.7)
EOSINOPHIL NFR BLD AUTO: 1.9 %
ERYTHROCYTE [DISTWIDTH] IN BLOOD BY AUTOMATED COUNT: 13.2 %
GLOBULIN PLAS-MCNC: 2.6 G/DL (ref 2–3.5)
GLUCOSE BLD-MCNC: 118 MG/DL (ref 70–99)
GLUCOSE BLD-MCNC: 129 MG/DL (ref 70–99)
GLUCOSE BLD-MCNC: 131 MG/DL (ref 70–99)
GLUCOSE BLD-MCNC: 138 MG/DL (ref 70–99)
GLUCOSE BLD-MCNC: 159 MG/DL (ref 70–99)
HCT VFR BLD AUTO: 35.8 %
HGB BLD-MCNC: 12.3 G/DL
IMM GRANULOCYTES # BLD AUTO: 0.09 X10(3) UL (ref 0–1)
IMM GRANULOCYTES NFR BLD: 0.7 %
LYMPHOCYTES # BLD AUTO: 2.76 X10(3) UL (ref 1–4)
LYMPHOCYTES NFR BLD AUTO: 22.5 %
MCH RBC QN AUTO: 27 PG (ref 26–34)
MCHC RBC AUTO-ENTMCNC: 34.4 G/DL (ref 31–37)
MCV RBC AUTO: 78.7 FL
MONOCYTES # BLD AUTO: 0.67 X10(3) UL (ref 0.1–1)
MONOCYTES NFR BLD AUTO: 5.5 %
NEUTROPHILS # BLD AUTO: 8.49 X10 (3) UL (ref 1.5–7.7)
NEUTROPHILS # BLD AUTO: 8.49 X10(3) UL (ref 1.5–7.7)
NEUTROPHILS NFR BLD AUTO: 69.2 %
OSMOLALITY SERPL CALC.SUM OF ELEC: 291 MOSM/KG (ref 275–295)
PLATELET # BLD AUTO: 378 10(3)UL (ref 150–450)
POTASSIUM SERPL-SCNC: 3.5 MMOL/L (ref 3.5–5.1)
PROT SERPL-MCNC: 6.8 G/DL (ref 5.7–8.2)
RBC # BLD AUTO: 4.55 X10(6)UL
SODIUM SERPL-SCNC: 141 MMOL/L (ref 136–145)
WBC # BLD AUTO: 12.3 X10(3) UL (ref 4–11)

## 2024-11-03 PROCEDURE — 99223 1ST HOSP IP/OBS HIGH 75: CPT | Performed by: STUDENT IN AN ORGANIZED HEALTH CARE EDUCATION/TRAINING PROGRAM

## 2024-11-03 RX ORDER — GABAPENTIN 300 MG/1
300 CAPSULE ORAL 3 TIMES DAILY
Status: DISCONTINUED | OUTPATIENT
Start: 2024-11-03 | End: 2024-11-04

## 2024-11-03 RX ORDER — POLYETHYLENE GLYCOL 3350 17 G/17G
17 POWDER, FOR SOLUTION ORAL DAILY PRN
Status: DISCONTINUED | OUTPATIENT
Start: 2024-11-03 | End: 2024-11-04

## 2024-11-03 RX ORDER — BISACODYL 10 MG
10 SUPPOSITORY, RECTAL RECTAL
Status: DISCONTINUED | OUTPATIENT
Start: 2024-11-03 | End: 2024-11-04

## 2024-11-03 RX ORDER — NICOTINE POLACRILEX 4 MG
30 LOZENGE BUCCAL
Status: DISCONTINUED | OUTPATIENT
Start: 2024-11-03 | End: 2024-11-04

## 2024-11-03 RX ORDER — DOCUSATE SODIUM 100 MG/1
100 CAPSULE, LIQUID FILLED ORAL 2 TIMES DAILY
Status: DISCONTINUED | OUTPATIENT
Start: 2024-11-03 | End: 2024-11-04

## 2024-11-03 RX ORDER — ECHINACEA PURPUREA EXTRACT 125 MG
1 TABLET ORAL
Status: DISCONTINUED | OUTPATIENT
Start: 2024-11-03 | End: 2024-11-04

## 2024-11-03 RX ORDER — ACETAMINOPHEN 500 MG
500 TABLET ORAL EVERY 4 HOURS PRN
Status: DISCONTINUED | OUTPATIENT
Start: 2024-11-03 | End: 2024-11-04

## 2024-11-03 RX ORDER — GABAPENTIN 300 MG/1
1 CAPSULE ORAL 3 TIMES DAILY
COMMUNITY
Start: 2024-10-24

## 2024-11-03 RX ORDER — ONDANSETRON 2 MG/ML
4 INJECTION INTRAMUSCULAR; INTRAVENOUS EVERY 6 HOURS PRN
Status: DISCONTINUED | OUTPATIENT
Start: 2024-11-03 | End: 2024-11-04

## 2024-11-03 RX ORDER — BENZONATATE 100 MG/1
200 CAPSULE ORAL 3 TIMES DAILY PRN
Status: DISCONTINUED | OUTPATIENT
Start: 2024-11-03 | End: 2024-11-04

## 2024-11-03 RX ORDER — FERROUS SULFATE 325(65) MG
325 TABLET, DELAYED RELEASE (ENTERIC COATED) ORAL 2 TIMES DAILY
Status: DISCONTINUED | OUTPATIENT
Start: 2024-11-03 | End: 2024-11-04

## 2024-11-03 RX ORDER — SODIUM PHOSPHATE, DIBASIC AND SODIUM PHOSPHATE, MONOBASIC 7; 19 G/230ML; G/230ML
1 ENEMA RECTAL ONCE AS NEEDED
Status: DISCONTINUED | OUTPATIENT
Start: 2024-11-03 | End: 2024-11-04

## 2024-11-03 RX ORDER — HYDROMORPHONE HYDROCHLORIDE 1 MG/ML
0.5 INJECTION, SOLUTION INTRAMUSCULAR; INTRAVENOUS; SUBCUTANEOUS EVERY 30 MIN PRN
Status: ACTIVE | OUTPATIENT
Start: 2024-11-03 | End: 2024-11-03

## 2024-11-03 RX ORDER — PNV NO.95/FERROUS FUM/FOLIC AC 28MG-0.8MG
325 TABLET ORAL 2 TIMES DAILY
COMMUNITY
Start: 2024-10-24 | End: 2025-01-22

## 2024-11-03 RX ORDER — DEXTROSE MONOHYDRATE 25 G/50ML
50 INJECTION, SOLUTION INTRAVENOUS
Status: DISCONTINUED | OUTPATIENT
Start: 2024-11-03 | End: 2024-11-04

## 2024-11-03 RX ORDER — BUTALBITAL, ACETAMINOPHEN AND CAFFEINE 50; 325; 40 MG/1; MG/1; MG/1
1 TABLET ORAL EVERY 4 HOURS PRN
Status: DISCONTINUED | OUTPATIENT
Start: 2024-11-03 | End: 2024-11-04

## 2024-11-03 RX ORDER — NICOTINE POLACRILEX 4 MG
15 LOZENGE BUCCAL
Status: DISCONTINUED | OUTPATIENT
Start: 2024-11-03 | End: 2024-11-04

## 2024-11-03 RX ORDER — ONDANSETRON 2 MG/ML
4 INJECTION INTRAMUSCULAR; INTRAVENOUS EVERY 4 HOURS PRN
Status: ACTIVE | OUTPATIENT
Start: 2024-11-03 | End: 2024-11-03

## 2024-11-03 RX ORDER — SODIUM CHLORIDE 9 MG/ML
INJECTION, SOLUTION INTRAVENOUS CONTINUOUS
Status: ACTIVE | OUTPATIENT
Start: 2024-11-03 | End: 2024-11-03

## 2024-11-03 RX ORDER — PROCHLORPERAZINE EDISYLATE 5 MG/ML
5 INJECTION INTRAMUSCULAR; INTRAVENOUS EVERY 8 HOURS PRN
Status: DISCONTINUED | OUTPATIENT
Start: 2024-11-03 | End: 2024-11-04

## 2024-11-03 NOTE — H&P
[facility]     Toyin Villegas Patient Status:  Observation    1995 MRN [id number]   Location [facility] 3NW-A Attending Lissette Aguirre,    Hosp Day # 0 PCP Apolonia Pickard MD     Chief Complaint: emesis, diarrhea    Subjective:    History of Present Illness:     Toyin Villegas is a 29 year old female with PMHx DM/ PCOS/ GURWINDER who presented to the hospital for diarrhea and emesis. She began to feel ill yesterday with epigastric abdominal pain which was sharp and rated 4/10. There were no alleviating or exacerbating factors. She had an episode of emesis and numerous episodes of non-bloody diarrhea. She denied any fever, chills.    History/Other:    Past Medical History:  Past Medical History:    ADD (attention deficit disorder with hyperactivity)    Depression    Diabetes (HCC)    Fatty liver    Migraines    Obesity, unspecified    PCOS (polycystic ovarian syndrome)     Past Surgical History:   History reviewed. No pertinent surgical history.   Family History:   Family History   Adopted: Yes   Family history unknown: Yes     Social History:    reports that she has never smoked. She has never used smokeless tobacco. She reports that she does not currently use alcohol after a past usage of about 1.0 standard drink of alcohol per week. She reports that she does not use drugs.     Allergies: Allergies[1]    Medications:  Medications Ordered Prior to Encounter[2]    Review of Systems:   A comprehensive review of systems was completed.    Pertinent positives and negatives noted in the HPI.    Objective:   Physical Exam:    /60   Pulse 90   Temp 97.9 °F (36.6 °C)   Resp 18   Ht 5' 2.5\" (1.588 m)   Wt 280 lb (127 kg)   LMP  (LMP Unknown)   SpO2 100%   BMI 50.40 kg/m²   General: No acute distress, Alert  Respiratory: No rhonchi, no wheezes  Cardiovascular: S1, S2. Regular rate and rhythm  Abdomen: Soft, mild tenderness to palpation of epigastric region, non-distended,  positive bowel sounds  Neuro: No new focal deficits  Extremities: +1 non-pitting bl pre-tibial edema    Results:    Labs:      Labs Last 24 Hours:    Recent Labs   Lab 11/02/24  1753   RBC 5.44*   HGB 14.8   HCT 43.1   MCV 79.2*   MCH 27.2   MCHC 34.3   RDW 13.2   NEPRELIM 9.09*   WBC 13.7*   .0       Recent Labs   Lab 11/02/24  1753   *   BUN 8*   CREATSERUM 0.72   EGFRCR 116   CA 9.6   ALB 3.5      K 3.8      CO2 24.0   ALKPHO 110*   AST 25   ALT 47   BILT 0.3   TP 8.0       No results found for: \"PT\", \"INR\"    No results for input(s): \"TROP\", \"TROPHS\", \"CK\" in the last 168 hours.    No results for input(s): \"TROP\", \"PBNP\" in the last 168 hours.    No results for input(s): \"PCT\" in the last 168 hours.    Imaging: Imaging data reviewed in Epic.    Assessment & Plan:      #Enteritis with cholelithiasis  #leukocytosis  -lipase 62  -WBC 13.7  -CT showing cholelithiasis, copious stool with enteritis  -RUQ US with gallstone and sludge  -NPO  -antibiotics: Unasyn   -zofran prn  -send GI panel, C Diff  -general surgery c/s in ED    #iron deficiency  -cont home iron supplement    #DM  -SSI, QID checks, hypoglycemia protocol  -hold home dulaglutide, glimepiride, metformin    #Migraines  -cont home Fioricet        Plan of care discussed with ED physician    Ellen Dow DO    Supplementary Documentation:     The 21st Century Cures Act makes medical notes like these available to patients in the interest of transparency. Please be advised this is a medical document. Medical documents are intended to carry relevant information, facts as evident, and the clinical opinion of the practitioner. The medical note is intended as peer to peer communication and may appear blunt or direct. It is written in medical language and may contain abbreviations or verbiage that are unfamiliar.                                       [1]   Allergies  Allergen Reactions    Pineapple      Itchy Throat   [2]   No current  facility-administered medications on file prior to encounter.     Current Outpatient Medications on File Prior to Encounter   Medication Sig Dispense Refill    Ferrous Sulfate (IRON) 325 (65 Fe) MG Oral Tab Take 325 mg by mouth 2 (two) times daily.      gabapentin 300 MG Oral Cap Take 1 capsule (300 mg total) by mouth 3 (three) times daily.      Dulaglutide (TRULICITY) 4.5 MG/0.5ML Subcutaneous Solution Pen-injector Inject 4.5 mg into the skin once a week. 12 Pen 0    ergocalciferol 1.25 MG (57662 UT) Oral Cap Take 1 capsule (50,000 Units total) by mouth once a week. 12 capsule 3    glimepiride 4 MG Oral Tab Take 1 tablet (4 mg total) by mouth before breakfast. 30 tablet 0    metFORMIN  MG Oral Tablet 24 Hr Take 1 tablet (500 mg total) by mouth daily with breakfast. 90 tablet 3    ONETOUCH DELICA LANCETS 33G Does not apply Misc Test twice daily 1 Box 3    medroxyPROGESTERone 150 MG/ML Intramuscular Suspension Inject 1 mL (150 mg total) into the muscle every 3 (three) months. 1 mL 0    [] Dulaglutide (TRULICITY) 3 MG/0.5ML Subcutaneous Solution Pen-injector Inject 3 mg into the skin once a week. 12 each 0    butalbital-acetaminophen-caffeine -40 MG Oral Tab Take 1 tablet by mouth every 4 (four) hours as needed for Pain.

## 2024-11-03 NOTE — PLAN OF CARE
Pt was seen in [place] and sent to [facility] at 2130 11/1. Pt arrived on unit at 0115 am; when questioned as to where she was for 4 hours, pt stated she had been waiting down in ED in [facility]. Pt is A&O x 4. Pt's lungs are clear, breathing unlabored, currently fitted with Cpap for Hx of GURWINDER. Vital signs are stable, on Tele and  per GURWINDER protocol. Pt's diet is strict NPO. Pt is continent of bladder and bowel. Pt ambulates independently. Skin admission assessment was performed with MARLO Mclain and revealed IV site on rt AC and otherwise intact skin.  Pt is good historian. Admission navigator completed, med reconciliation reviewed. Orders received are Unasyn Q6, sx consult, NPO, QID Accuchecks. [place] Diabetic protocol reviewed and agreed to by pt.  Hospitalist saw pt and placed floor orders shortly after arriving. No complaints of pain reported at this time. NOC safety plan in place, bed in low position, call light and personal items within reach, pt encouraged to call for assistance.

## 2024-11-03 NOTE — PROGRESS NOTES
Pt reports last bowel movement 10/31. Denies nausea.. Reports 2/10 abdominal pain. Declines need for pain medication. Bowel regimen started. Miralax and colace administered. Will send next stool for GI panel. IVF and abx infusing. CLD. Pt and her family updated on care.

## 2024-11-03 NOTE — ED QUICK NOTES
Report given to Alda KOHLI.     Pt driving herself by private car. Staff instructed her to go directly to the hospital; pt verbalized understanding. Pt has her Direct Admit form.

## 2024-11-03 NOTE — CONSULTS
[facility]  Report of Consultation    Toyin Villegas Patient Status:  Observation    1995 MRN [id number]   Location [facility] 3NW-A Attending Lissette Aguirre, DO   Hosp Day # 0 PCP Apolonia Pickard MD     Requesting Physician:  Dr. Ceballos     Reason for Consultation:  Acute cholecystitis     Chief Complaint:  Nausea, vomiting.     History of Present Illness:  Toyin Villegas is a 29 year old female with a past medical history significant for Diabetes mellitus, PCOS, GURWINDER who  who presents to [facility] on 2024 with concerns of epigastric abdominal pain that began 6 days prior. She reports associated nausea and vomiting. She reports associated diarrhea. She reports no recent antibiotic use. She denies any known sick contact.     Upon presentation to the hospital, she was Laboratory workup revealed leukocytosis, WBC 13.7, hemoglobin 14.8, platelets 424,000. CMP revealed no gross electrolyte abnormalities. No acute kidney injury, creatinine 0.72. No elevation in AST or ALT. Alkaline phosphatase 110. Total bilirubin 0.3. Lipase 62. CT of the abdomen and pelvis revealed cholelithiasis, non-obstruction left renal calcification, copious amount of stool throughout colon, several fluid-filled small bowel loops present can bee seen in enteritis. Ultrasound of the gallbladder revealed contracted gallbladder with gallstones and sludge. No biliary dilatation.     She has a past medical history significant for Diabetes, PCOS and GURWINDER. She reports no previous abdominal surgeries. She is not on any blood thinning medication.     History:  Past Medical History:    ADD (attention deficit disorder with hyperactivity)    Depression    Diabetes (HCC)    Fatty liver    Migraines    Obesity, unspecified    PCOS (polycystic ovarian syndrome)     History reviewed. No pertinent surgical history.  Family History   Adopted: Yes   Family history unknown: Yes      reports that she has never smoked. She  has never used smokeless tobacco. She reports that she does not currently use alcohol after a past usage of about 1.0 standard drink of alcohol per week. She reports that she does not use drugs.    Allergies:  Allergies[1]    Medications:  Medications Prior to Admission   Medication Sig    Ferrous Sulfate (IRON) 325 (65 Fe) MG Oral Tab Take 325 mg by mouth 2 (two) times daily.    gabapentin 300 MG Oral Cap Take 1 capsule (300 mg total) by mouth 3 (three) times daily.    Dulaglutide (TRULICITY) 4.5 MG/0.5ML Subcutaneous Solution Pen-injector Inject 4.5 mg into the skin once a week.    ergocalciferol 1.25 MG (43652 UT) Oral Cap Take 1 capsule (50,000 Units total) by mouth once a week.    glimepiride 4 MG Oral Tab Take 1 tablet (4 mg total) by mouth before breakfast.    metFORMIN  MG Oral Tablet 24 Hr Take 1 tablet (500 mg total) by mouth daily with breakfast.    ONETOUCH DELICA LANCETS 33G Does not apply Misc Test twice daily    medroxyPROGESTERone 150 MG/ML Intramuscular Suspension Inject 1 mL (150 mg total) into the muscle every 3 (three) months.    [] Dulaglutide (TRULICITY) 3 MG/0.5ML Subcutaneous Solution Pen-injector Inject 3 mg into the skin once a week.    butalbital-acetaminophen-caffeine -40 MG Oral Tab Take 1 tablet by mouth every 4 (four) hours as needed for Pain.         Current Facility-Administered Medications:     butalbital-acetaminophen-caffeine (Fioricet) -40 MG per tab 1 tablet, 1 tablet, Oral, Q4H PRN    ferrous sulfate DR tab 325 mg, 325 mg, Oral, BID    gabapentin (Neurontin) cap 300 mg, 300 mg, Oral, TID    glucose (Dex4) 15 GM/59ML oral liquid 15 g, 15 g, Oral, Q15 Min PRN **OR** glucose (Glutose) 40% oral gel 15 g, 15 g, Oral, Q15 Min PRN **OR** glucose-vitamin C (Dex-4) chewable tab 4 tablet, 4 tablet, Oral, Q15 Min PRN **OR** dextrose 50% injection 50 mL, 50 mL, Intravenous, Q15 Min PRN **OR** glucose (Dex4) 15 GM/59ML oral liquid 30 g, 30 g, Oral, Q15 Min PRN  **OR** glucose (Glutose) 40% oral gel 30 g, 30 g, Oral, Q15 Min PRN **OR** glucose-vitamin C (Dex-4) chewable tab 8 tablet, 8 tablet, Oral, Q15 Min PRN    acetaminophen (Tylenol Extra Strength) tab 500 mg, 500 mg, Oral, Q4H PRN    melatonin tab 3 mg, 3 mg, Oral, Nightly PRN    ondansetron (Zofran) 4 MG/2ML injection 4 mg, 4 mg, Intravenous, Q6H PRN    prochlorperazine (Compazine) 10 MG/2ML injection 5 mg, 5 mg, Intravenous, Q8H PRN    insulin aspart (NovoLOG) 100 Units/mL FlexPen 2-10 Units, 2-10 Units, Subcutaneous, TID AC and HS    benzonatate (Tessalon) cap 200 mg, 200 mg, Oral, TID PRN    glycerin-hypromellose- (Artificial Tears) 0.2-0.2-1 % ophthalmic solution 1 drop, 1 drop, Both Eyes, QID PRN    sodium chloride (Saline Mist) 0.65 % nasal solution 1 spray, 1 spray, Each Nare, Q3H PRN    ampicillin-sulbactam (Unasyn) 3 g in sodium chloride 0.9% 100mL IVPB-ADD, 3 g, Intravenous, Q6H    Review of Systems:    Allergic/Immuno:  Review of patient's allergies performed.  Cardiovascular:  Negative for cool extremity and irregular heartbeat/palpitations  Constitutional:  Negative for decreased activity, fever, irritability and lethargy  Endocrine:  Negative for abnormal sleep patterns, increased activity, polydipsia and polyphagia  ENMT:  Negative for ear drainage, hearing loss and nasal drainage  Eyes:  Negative for eye discharge and vision loss  Gastrointestinal:  Positive for abdominal pain, nausea, vomiting, diarrhea. Negative for  constipation.  Genitourinary:  Negative for dysuria and hematuria  Hema/Lymph:  Negative for easy bleeding and easy bruising  Integumentary:  Negative for pruritus and rash  Musculoskeletal:  Negative for bone/joint symptoms  Neurological:  Negative for gait disturbance  Psychiatric:  Negative for inappropriate interaction and psychiatric symptoms  Respiratory:  Negative for cough, dyspnea and wheezing     Physical Exam:  /70 (BP Location: Left arm)   Pulse 81   Temp 98 °F  (36.7 °C) (Oral)   Resp 20   Ht 62.5\"   Wt 280 lb (127 kg)   LMP  (LMP Unknown)   SpO2 98%   BMI 50.40 kg/m²     General: Awake, Alert,  Cooperative.  No apparent distress.  HEENT: Exam is unremarkable.  Without scleral icterus.  Mucous membranes are moist.   Neck: No respiratory distress, effort normal.  Full range of motion to flexion and extension, lateral rotation and lateral flexion of cervical spine.  No JVD. Supple.   Lungs: No respiratory distress, effort normal.   Abdomen:  Soft, obese,  non-distended, tender to palpation in epigastrium, with no rebound or guarding.  No peritoneal signs. No ascites.  Liver is within normal limits.  Spleen is not palpable.    Extremities:  No lower extremity edema noted.  Without clubbing or cyanosis.    Skin: Normal texture and turgor.  Lymphatic:  No palpable cervical lymphadenopathy.  Neurologic: Cranial nerves are grossly intact.  Motor strength and sensory examination is grossly normal.  No focal neurologic deficit.    Laboratory Data:  Recent Labs   Lab 11/02/24  1753 11/03/24  0556   RBC 5.44* 4.55   HGB 14.8 12.3   HCT 43.1 35.8   MCV 79.2* 78.7*   MCH 27.2 27.0   MCHC 34.3 34.4   RDW 13.2 13.2   NEPRELIM 9.09* 8.49*   WBC 13.7* 12.3*   .0 378.0       Recent Labs   Lab 11/02/24  1753 11/03/24  0556   * 131*   BUN 8* 6*   CREATSERUM 0.72 0.68   CA 9.6 9.0   ALB 3.5 4.2    141   K 3.8 3.5    110   CO2 24.0 21.0   ALKPHO 110* 100*   AST 25 27   ALT 47 35   BILT 0.3 0.6   TP 8.0 6.8         No results for input(s): \"PTP\", \"INR\", \"PTT\" in the last 168 hours.      US GALLBLADDER (CPT=76705)    Result Date: 11/2/2024  CONCLUSION:  1. Gallbladder is contracted but contains gallstones and sludge.  No biliary dilatation.  Sonographer states negative sonographic Alvarez sign.  Correlate clinically.   LOCATION:  [facility]    Dictated by (CST): Nell Espinosa MD on 11/02/2024 at 8:33 PM     Finalized by (CST): Nell Espinosa MD on 11/02/2024 at 8:34 PM        CT ABDOMEN+PELVIS(CONTRAST ONLY)(CPT=74177)    Result Date: 11/2/2024  CONCLUSION:  1. Cholelithiasis. 2. Non-obstructing left renal calcification. 3. Copious amount of stool throughout the colon. 4. Several fluid-filled small bowel loops are present can be seen in enteritis, correlate clinically.   LOCATION:  [facility]   Dictated by (CST): Nell Espinosa MD on 11/02/2024 at 7:28 PM     Finalized by (CST): Nell Espinosa MD on 11/02/2024 at 7:30 PM          Shelly Garza PA-C  11/3/2024  9:04 AM      Medical Decision Making         Impression:  Patient Active Problem List   Diagnosis    ADD (attention deficit disorder)    Depression    Abnormal liver function tests    PCO (polycystic ovaries)    Fatty liver    Chronic nonalcoholic liver disease    Polycystic ovaries    Vitamin D deficiency    Major depression, recurrent (HCC)    Anxiety state    GURWINDER on CPAP    Perineal abscess    Acute cholecystitis    Morbid obesity (HCC)    Nausea and vomiting, unspecified vomiting type       29 year old female who presents with nausea and vomiting and diarrhea. Patient reports mild discomfort in the upper quadrants. I reviewed her ultrasound and CT scan. There is cholelithiasis without signs of acute cholecystitis. I discussed with her the findings. Patient can have a diet. Will send for stool studies to evaluate for gastroenteritis. No concern for acute cholecystitis clinically. Patient can follow up with EMG surgery as an outpatient for evaluation of symptomatic cholelithiasis and elective cholecystectomy if indicated. Will sign off .       Is this a shared or split note between Advanced Practice Provider and Physician? Yes             Teresa Juares MD                  [1]   Allergies  Allergen Reactions    Pineapple      Itchy Throat

## 2024-11-04 VITALS
BODY MASS INDEX: 50.24 KG/M2 | OXYGEN SATURATION: 96 % | SYSTOLIC BLOOD PRESSURE: 131 MMHG | HEIGHT: 62.5 IN | DIASTOLIC BLOOD PRESSURE: 70 MMHG | RESPIRATION RATE: 18 BRPM | WEIGHT: 280 LBS | TEMPERATURE: 99 F | HEART RATE: 98 BPM

## 2024-11-04 PROBLEM — K52.9 ENTERITIS: Status: ACTIVE | Noted: 2024-11-04

## 2024-11-04 PROBLEM — E11.9 DIABETES MELLITUS (HCC): Status: ACTIVE | Noted: 2024-11-04

## 2024-11-04 LAB
ADENOVIRUS F 40/41 PCR: NEGATIVE
ASTROVIRUS PCR: NEGATIVE
C CAYETANENSIS DNA SPEC QL NAA+PROBE: NEGATIVE
CAMPY SP DNA.DIARRHEA STL QL NAA+PROBE: NEGATIVE
CRYPTOSP DNA SPEC QL NAA+PROBE: NEGATIVE
EAEC PAA PLAS AGGR+AATA ST NAA+NON-PRB: NEGATIVE
EC STX1+STX2 + H7 FLIC SPEC NAA+PROBE: NEGATIVE
ENTAMOEBA HISTOLYTICA PCR: NEGATIVE
EPEC EAE GENE STL QL NAA+NON-PROBE: NEGATIVE
ETEC LTA+ST1A+ST1B TOX ST NAA+NON-PROBE: NEGATIVE
GIARDIA LAMBLIA PCR: NEGATIVE
GLUCOSE BLD-MCNC: 121 MG/DL (ref 70–99)
NOROVIRUS GI/GII PCR: NEGATIVE
P SHIGELLOIDES DNA STL QL NAA+PROBE: NEGATIVE
ROTAVIRUS A PCR: NEGATIVE
SALMONELLA DNA SPEC QL NAA+PROBE: NEGATIVE
SAPOVIRUS PCR: NEGATIVE
SHIGELLA SP+EIEC IPAH ST NAA+NON-PROBE: NEGATIVE
V CHOLERAE DNA SPEC QL NAA+PROBE: NEGATIVE
VIBRIO DNA SPEC NAA+PROBE: NEGATIVE
YERSINIA DNA SPEC NAA+PROBE: NEGATIVE

## 2024-11-04 PROCEDURE — 99239 HOSP IP/OBS DSCHRG MGMT >30: CPT | Performed by: INTERNAL MEDICINE

## 2024-11-04 RX ORDER — GLIMEPIRIDE 4 MG/1
4 TABLET ORAL
Qty: 90 TABLET | Refills: 1 | Status: SHIPPED | OUTPATIENT
Start: 2024-11-04

## 2024-11-04 NOTE — CM/SW NOTE
Pt's supportive living facility, [facility] contacted to update facility of pt's return today. VM left. Pt also reports she has been in contact with the Director at facility. Contact information for CM provided to pt if facility has any questions/ concerns w/ pt's return.    Pt transported home by her mother.    CECILIA MartinezN, CMSRN    [id number]

## 2024-11-04 NOTE — PROGRESS NOTES
NURSING DISCHARGE NOTE    Discharged Home via Ambulatory.  Wheelchair offered, pt declined.  Accompanied by Family member  Belongings Taken by patient/family.    VSS, tolerating diet, voiding adequately, pain controlled, tolerating ambulating. Discharge education provided. Reviewed medications and follow up appts. All questions answered and concerns addressed, pt verbalized understanding. Pt dc in stable condition.    Upon assessment, A&Ox4. RA.  WDL. NSR on Tele. HR in the 90s. Educated pt to perform ankle pumps for VTE proph. Tolerating diet. Abdomen soft. Active bowel sounds. Voids. Independent, mom at bedside. IV ABX infusing. Denies SOB, CP, lightheadedness, N/V, and pain at this time. POC discussed, all questions answered and concerns addressed. Safety precautions in place. Frequent rounds performed.

## 2024-11-04 NOTE — DISCHARGE SUMMARY
[facility]     Toyin Villegas Patient Status:  Observation    1995 MRN [id number]   Location [facility] 3NW-A Attending Horacio Moralez MD   Hosp Day # 0 PCP Apolonia Pickard MD     Date of Admission: 2024  Date of Discharge:   2024      Discharge Disposition: Home or Self Care    Discharge Diagnosis:  Enteritis  DM  Migraines    History of Present Illness: Toyin Villegas is a 29 year old female with PMHx DM/ PCOS/ GURWINDER who presented to the hospital for diarrhea and emesis. She began to feel ill yesterday with epigastric abdominal pain which was sharp and rated 4/10. There were no alleviating or exacerbating factors. She had an episode of emesis and numerous episodes of non-bloody diarrhea. She denied any fever, chills.     Brief Synopsis: Pt was admitted and given supportive care with hydration and abx. She improved and was seen by surgery for possible gallstones however give her symptoms improvement no plans for surgery. Pt was tolerating diet prior to DC.     Lace+ Score: 52  59-90 High Risk  29-58 Medium Risk  0-28   Low Risk       TCM Follow-Up Recommendation:  LACE 29-58: Moderate Risk of readmission after discharge from the hospital.      Procedures during hospitalization:   none    Incidental or significant findings and recommendations (brief descriptions):  none    Lab/Test results pending at Discharge:   none    Consultants:  Surgery    Discharge Medication List:     Discharge Medications        START taking these medications        Instructions Prescription details   amoxicillin clavulanate 875-125 MG Tabs  Commonly known as: Augmentin      Take 1 tablet by mouth 2 (two) times daily for 5 days.   Stop taking on: 2024  Quantity: 10 tablet  Refills: 0            CHANGE how you take these medications        Instructions Prescription details   Trulicity 4.5 MG/0.5ML Sopn  Generic drug: Dulaglutide  What changed: Another medication with the same  name was removed. Continue taking this medication, and follow the directions you see here.      Inject 4.5 mg into the skin once a week.   Stop taking on: December 23, 2024  Quantity: 12 Pen  Refills: 0            CONTINUE taking these medications        Instructions Prescription details   butalbital-acetaminophen-caffeine -40 MG Tabs  Commonly known as: Fioricet      Take 1 tablet by mouth every 4 (four) hours as needed for Pain.   Refills: 0     ergocalciferol 1.25 MG (84462 UT) Caps  Commonly known as: Vitamin D2      Take 1 capsule (50,000 Units total) by mouth once a week.   Quantity: 12 capsule  Refills: 3     gabapentin 300 MG Caps  Commonly known as: Neurontin      Take 1 capsule (300 mg total) by mouth 3 (three) times daily.   Refills: 0     glimepiride 4 MG Tabs  Commonly known as: Amaryl      Take 1 tablet (4 mg total) by mouth before breakfast.   Quantity: 30 tablet  Refills: 0     Iron 325 (65 Fe) MG Tabs      Take 325 mg by mouth 2 (two) times daily.   Stop taking on: January 22, 2025  Refills: 0     medroxyPROGESTERone 150 MG/ML Susp  Commonly known as: Depo-Provera      Inject 1 mL (150 mg total) into the muscle every 3 (three) months.   Quantity: 1 mL  Refills: 0     metFORMIN  MG Tb24  Commonly known as: Glucophage XR      Take 1 tablet (500 mg total) by mouth daily with breakfast.   Quantity: 90 tablet  Refills: 3     OneTouch Delica Lancets 33G Misc      Test twice daily   Quantity: 1 Box  Refills: 3               Where to Get Your Medications        Information about where to get these medications is not yet available    Ask your nurse or doctor about these medications  amoxicillin clavulanate 875-125 MG Tabs         [facility] reviewed: yes    Follow-up appointment:   Teresa Juares MD  [address] [facility] [address] [phone]    Follow up  If symptoms worsen (for evaluation of symptomatic cholelithiasis and elective cholecystectomy if indicated)    Apolonia Pickard MD  [address] [facility] [address]  [facility] [address]  [phone]    Schedule an appointment as soon as possible for a visit      Appointments for Next 30 Days 2024 - 2024      None            Vital signs:  Temp:  [97.6 °F (36.4 °C)-98.7 °F (37.1 °C)] 98.5 °F (36.9 °C)  Pulse:  [] 98  Resp:  [13-18] 18  BP: (129-140)/(70-79) 131/70  SpO2:  [96 %-99 %] 96 %    Physical Exam:    General: No acute distress   Lungs: clear to auscultation  Cardiovascular: S1, S2  Abdomen: Soft      -----------------------------------------------------------------------------------------------  PATIENT DISCHARGE INSTRUCTIONS: See electronic chart    Horacio Moralez MD    Total time spent on discharge plannin minutes     The  [employer] Cures Act makes medical notes like these available to patients in the interest of transparency. Please be advised this is a medical document. Medical documents are intended to carry relevant information, facts as evident, and the clinical opinion of the practitioner. The medical note is intended as peer to peer communication and may appear blunt or direct. It is written in medical language and may contain abbreviations or verbiage that are unfamiliar.

## 2024-11-04 NOTE — PLAN OF CARE
Received pt at 1930. Pt is A&O x 4; follows commands, now on unasyn and GI panel pending. Pt is on RA, VSS, reg diet with Qid Accuchecks. Pt is continent of bowel and bladder. Pt's pain is mild, declining medication, and ambulates independently. IV access is patent. Shift assessment performed, HS meds given. NOC safety plan in place; bed in low position, call light and personal items within reach, pt encouraged to call staff for assistance.

## 2024-11-06 ENCOUNTER — PATIENT OUTREACH (OUTPATIENT)
Dept: CASE MANAGEMENT | Age: 29
End: 2024-11-06

## 2024-11-06 NOTE — PROGRESS NOTES
NCM attempted to contact the patient for HFU. A woman answered and said hello a few times before phone disconnected. NCM will try again later.

## 2024-11-08 ENCOUNTER — TELEPHONE (OUTPATIENT)
Dept: FAMILY MEDICINE CLINIC | Facility: CLINIC | Age: 29
End: 2024-11-08

## 2024-11-08 NOTE — TELEPHONE ENCOUNTER
glimepiride 4 MG Oral Tab   PATIENT IS SAYING THEY HAVE 2 ACTIVE PRESCRIPTIONS AND WONDERING WHY

## 2024-11-08 NOTE — TELEPHONE ENCOUNTER
Patient called back and was questioning why her [facility] indicates two prescriptions for glimepiride, patient advised and agrees to contact [employer] regarding this. Patient will also contact office with any further questions or concerns.

## 2024-11-19 NOTE — PROGRESS NOTES
Multiple attempts to reach the pt with no returned phone call. Past TCM timeframe, closing encounter.    gen anesthesia

## 2024-11-23 ENCOUNTER — OFFICE VISIT (OUTPATIENT)
Dept: FAMILY MEDICINE CLINIC | Facility: CLINIC | Age: 29
End: 2024-11-23
Payer: MEDICAID

## 2024-11-23 VITALS
HEART RATE: 78 BPM | WEIGHT: 289 LBS | TEMPERATURE: 98 F | OXYGEN SATURATION: 98 % | RESPIRATION RATE: 18 BRPM | DIASTOLIC BLOOD PRESSURE: 72 MMHG | SYSTOLIC BLOOD PRESSURE: 108 MMHG | BODY MASS INDEX: 52 KG/M2

## 2024-11-23 DIAGNOSIS — E11.9 DIABETES MELLITUS WITHOUT COMPLICATION (HCC): Primary | ICD-10-CM

## 2024-11-23 LAB
ALBUMIN SERPL-MCNC: 3.8 G/DL (ref 3.2–4.8)
ALBUMIN/GLOB SERPL: 1.1 {RATIO} (ref 1–2)
ALP LIVER SERPL-CCNC: 107 U/L
ALT SERPL-CCNC: 31 U/L
ANION GAP SERPL CALC-SCNC: 7 MMOL/L (ref 0–18)
AST SERPL-CCNC: 23 U/L (ref ?–34)
BILIRUB SERPL-MCNC: 0.3 MG/DL (ref 0.3–1.2)
BUN BLD-MCNC: 8 MG/DL (ref 9–23)
CALCIUM BLD-MCNC: 9.4 MG/DL (ref 8.7–10.4)
CHLORIDE SERPL-SCNC: 109 MMOL/L (ref 98–112)
CHOLEST SERPL-MCNC: 144 MG/DL (ref ?–200)
CO2 SERPL-SCNC: 22 MMOL/L (ref 21–32)
CREAT BLD-MCNC: 0.78 MG/DL
EGFRCR SERPLBLD CKD-EPI 2021: 105 ML/MIN/1.73M2 (ref 60–?)
EST. AVERAGE GLUCOSE BLD GHB EST-MCNC: 171 MG/DL (ref 68–126)
GLOBULIN PLAS-MCNC: 3.6 G/DL (ref 2–3.5)
GLUCOSE BLD-MCNC: 189 MG/DL (ref 70–99)
HBA1C MFR BLD: 7.6 % (ref ?–5.7)
HDLC SERPL-MCNC: 30 MG/DL (ref 40–59)
LDLC SERPL CALC-MCNC: 73 MG/DL (ref ?–100)
NONHDLC SERPL-MCNC: 114 MG/DL (ref ?–130)
OSMOLALITY SERPL CALC.SUM OF ELEC: 289 MOSM/KG (ref 275–295)
POTASSIUM SERPL-SCNC: 4.1 MMOL/L (ref 3.5–5.1)
PROT SERPL-MCNC: 7.4 G/DL (ref 5.7–8.2)
SODIUM SERPL-SCNC: 138 MMOL/L (ref 136–145)
TRIGL SERPL-MCNC: 251 MG/DL (ref 30–149)
VLDLC SERPL CALC-MCNC: 39 MG/DL (ref 0–30)

## 2024-11-23 PROCEDURE — 80061 LIPID PANEL: CPT | Performed by: INTERNAL MEDICINE

## 2024-11-23 PROCEDURE — 99214 OFFICE O/P EST MOD 30 MIN: CPT | Performed by: INTERNAL MEDICINE

## 2024-11-23 PROCEDURE — 83036 HEMOGLOBIN GLYCOSYLATED A1C: CPT | Performed by: INTERNAL MEDICINE

## 2024-11-23 PROCEDURE — 80053 COMPREHEN METABOLIC PANEL: CPT | Performed by: INTERNAL MEDICINE
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
## 2024-12-04 RX ORDER — GLIMEPIRIDE 4 MG/1
4 TABLET ORAL
Qty: 90 TABLET | Refills: 1 | OUTPATIENT
Start: 2024-12-04

## 2024-12-04 NOTE — TELEPHONE ENCOUNTER
Last refill: 11/04/24  qtY: 90  W/ 1 refills  Last ov: 11/23/24    Requested Prescriptions     Pending Prescriptions Disp Refills    GLIMEPIRIDE 4 MG Oral Tab [Pharmacy Med Name: GLIMEPIRIDE 4MG TABLETS] 90 tablet 1     Sig: TAKE 1 TABLET(4 MG) BY MOUTH BEFORE BREAKFAST     No future appointments.

## 2025-01-22 ENCOUNTER — TELEPHONE (OUTPATIENT)
Dept: FAMILY MEDICINE CLINIC | Facility: CLINIC | Age: 30
End: 2025-01-22

## 2025-01-22 DIAGNOSIS — Z00.00 WELL ADULT EXAM: ICD-10-CM

## 2025-01-22 PROCEDURE — 81025 URINE PREGNANCY TEST: CPT | Performed by: INTERNAL MEDICINE

## 2025-01-22 RX ORDER — MEDROXYPROGESTERONE ACETATE 150 MG/ML
150 INJECTION, SUSPENSION INTRAMUSCULAR
Qty: 1 ML | Refills: 0 | Status: CANCELLED | OUTPATIENT
Start: 2025-01-22

## 2025-01-22 RX ORDER — MEDROXYPROGESTERONE ACETATE 150 MG/ML
150 INJECTION, SUSPENSION INTRAMUSCULAR
Qty: 1 EACH | Refills: 3 | Status: SHIPPED | OUTPATIENT
Start: 2025-01-22 | End: 2025-01-26

## 2025-01-22 NOTE — TELEPHONE ENCOUNTER
NEED APPOINTMENT FOR DEPO SHOT, & ALSO SEND DEPO TO [facility] ON [address], CALL PATIENT BACK TO MAKE APPOINTMENT

## 2025-01-22 NOTE — TELEPHONE ENCOUNTER
Last Depo was given here on 9/18/24. Patient was due between 12/4-12/18, and will need a pregnancy test in the office.

## 2025-01-25 ENCOUNTER — TELEPHONE (OUTPATIENT)
Dept: FAMILY MEDICINE CLINIC | Facility: CLINIC | Age: 30
End: 2025-01-25

## 2025-01-25 ENCOUNTER — NURSE ONLY (OUTPATIENT)
Dept: FAMILY MEDICINE CLINIC | Facility: CLINIC | Age: 30
End: 2025-01-25
Payer: MEDICAID

## 2025-01-25 DIAGNOSIS — Z30.9 ENCOUNTER FOR CONTRACEPTIVE MANAGEMENT, UNSPECIFIED TYPE: Primary | ICD-10-CM

## 2025-01-25 LAB
CONTROL LINE PRESENT WITH A CLEAR BACKGROUND (YES/NO): YES YES/NO
KIT LOT #: NORMAL NUMERIC

## 2025-01-25 PROCEDURE — 96372 THER/PROPH/DIAG INJ SC/IM: CPT | Performed by: INTERNAL MEDICINE

## 2025-01-25 RX ORDER — MEDROXYPROGESTERONE ACETATE 150 MG/ML
150 INJECTION, SUSPENSION INTRAMUSCULAR ONCE
Status: COMPLETED | OUTPATIENT
Start: 2025-01-25 | End: 2025-01-25

## 2025-01-25 RX ADMIN — MEDROXYPROGESTERONE ACETATE 150 MG: 150 INJECTION, SUSPENSION INTRAMUSCULAR at 10:51:00

## 2025-01-25 NOTE — PROGRESS NOTES
Patient is overdue for Depo Provera. Urine pregnancy performed and was negative. Depo Provera administered intramuscularly to right ventrogluteal. Patient given time frame in which to return for next Depo Provera;

## 2025-01-25 NOTE — TELEPHONE ENCOUNTER
Татьяна was here for a nurse appointment and voiced concern that she has a spot that looks like a pimple to her right forearm next to where she had an IV placed in October. She picked it and there is a pinpoint scab there. Bandaid applied. Patient advised to keep clean and send a picture through Mychart if it gets red or is draining.

## 2025-01-28 ENCOUNTER — TELEPHONE (OUTPATIENT)
Dept: FAMILY MEDICINE CLINIC | Facility: CLINIC | Age: 30
End: 2025-01-28

## 2025-01-28 NOTE — TELEPHONE ENCOUNTER
Patient scheduled appt. Through My Chart 2-20-25 for: Heart concern and overdue follow up   Please triage if needed. Thanks

## 2025-01-29 NOTE — TELEPHONE ENCOUNTER
Spoke with pt. She scheduled an appt through [facility] for \"heart concern\".    Pt c/o feeling like her heart \"skips a beat\". Reports she has had this in the past and mentioned it to Dr. Pickard, but doesn't feel like she rec'd a full explanation.  States it is now happening daily x1 week--- 1-2 times per day.   Denies any chest pain, lightheadedness or SOB when this happens.   Pt questioning if this is due to her DM?    Asked pt if she can schedule an appt prior to 2/20?  Pt unable to d/t her work schedule.    Okay to wait until then?  And if sx worsen, to c/b or IC for eval?

## 2025-02-20 ENCOUNTER — TELEPHONE (OUTPATIENT)
Dept: FAMILY MEDICINE CLINIC | Facility: CLINIC | Age: 30
End: 2025-02-20

## 2025-02-20 ENCOUNTER — OFFICE VISIT (OUTPATIENT)
Dept: FAMILY MEDICINE CLINIC | Facility: CLINIC | Age: 30
End: 2025-02-20
Payer: MEDICAID

## 2025-02-20 ENCOUNTER — LABORATORY ENCOUNTER (OUTPATIENT)
Dept: LAB | Age: 30
End: 2025-02-20
Attending: INTERNAL MEDICINE
Payer: MEDICAID

## 2025-02-20 VITALS
OXYGEN SATURATION: 98 % | SYSTOLIC BLOOD PRESSURE: 116 MMHG | BODY MASS INDEX: 52 KG/M2 | HEART RATE: 93 BPM | DIASTOLIC BLOOD PRESSURE: 78 MMHG | TEMPERATURE: 99 F | RESPIRATION RATE: 18 BRPM | WEIGHT: 291 LBS

## 2025-02-20 DIAGNOSIS — E66.812 CLASS 2 SEVERE OBESITY DUE TO EXCESS CALORIES WITH SERIOUS COMORBIDITY AND BODY MASS INDEX (BMI) OF 36.0 TO 36.9 IN ADULT (HCC): ICD-10-CM

## 2025-02-20 DIAGNOSIS — E66.812 CLASS 2 SEVERE OBESITY DUE TO EXCESS CALORIES WITH SERIOUS COMORBIDITY AND BODY MASS INDEX (BMI) OF 36.0 TO 36.9 IN ADULT (HCC): Primary | ICD-10-CM

## 2025-02-20 DIAGNOSIS — E66.01 CLASS 2 SEVERE OBESITY DUE TO EXCESS CALORIES WITH SERIOUS COMORBIDITY AND BODY MASS INDEX (BMI) OF 36.0 TO 36.9 IN ADULT (HCC): Primary | ICD-10-CM

## 2025-02-20 DIAGNOSIS — E66.01 CLASS 2 SEVERE OBESITY DUE TO EXCESS CALORIES WITH SERIOUS COMORBIDITY AND BODY MASS INDEX (BMI) OF 36.0 TO 36.9 IN ADULT (HCC): ICD-10-CM

## 2025-02-20 DIAGNOSIS — R00.2 PALPITATION: ICD-10-CM

## 2025-02-20 LAB
ALBUMIN SERPL-MCNC: 4.8 G/DL (ref 3.2–4.8)
ALBUMIN/GLOB SERPL: 1.8 {RATIO} (ref 1–2)
ALP LIVER SERPL-CCNC: 121 U/L
ALT SERPL-CCNC: 73 U/L
ANION GAP SERPL CALC-SCNC: 9 MMOL/L (ref 0–18)
AST SERPL-CCNC: 50 U/L (ref ?–34)
BILIRUB SERPL-MCNC: 0.5 MG/DL (ref 0.3–1.2)
BUN BLD-MCNC: 7 MG/DL (ref 9–23)
CALCIUM BLD-MCNC: 9.8 MG/DL (ref 8.7–10.6)
CHLORIDE SERPL-SCNC: 105 MMOL/L (ref 98–112)
CO2 SERPL-SCNC: 26 MMOL/L (ref 21–32)
CREAT BLD-MCNC: 0.73 MG/DL
CREAT UR-SCNC: 114.9 MG/DL
EGFRCR SERPLBLD CKD-EPI 2021: 114 ML/MIN/1.73M2 (ref 60–?)
EST. AVERAGE GLUCOSE BLD GHB EST-MCNC: 197 MG/DL (ref 68–126)
FASTING STATUS PATIENT QL REPORTED: NO
GLOBULIN PLAS-MCNC: 2.7 G/DL (ref 2–3.5)
GLUCOSE BLD-MCNC: 120 MG/DL (ref 70–99)
HBA1C MFR BLD: 8.5 % (ref ?–5.7)
MICROALBUMIN UR-MCNC: 5.9 MG/DL
MICROALBUMIN/CREAT 24H UR-RTO: 51.3 UG/MG (ref ?–30)
OSMOLALITY SERPL CALC.SUM OF ELEC: 289 MOSM/KG (ref 275–295)
POTASSIUM SERPL-SCNC: 3.6 MMOL/L (ref 3.5–5.1)
PROT SERPL-MCNC: 7.5 G/DL (ref 5.7–8.2)
SODIUM SERPL-SCNC: 140 MMOL/L (ref 136–145)

## 2025-02-20 PROCEDURE — 82570 ASSAY OF URINE CREATININE: CPT

## 2025-02-20 PROCEDURE — 82043 UR ALBUMIN QUANTITATIVE: CPT

## 2025-02-20 PROCEDURE — 83036 HEMOGLOBIN GLYCOSYLATED A1C: CPT

## 2025-02-20 PROCEDURE — 36415 COLL VENOUS BLD VENIPUNCTURE: CPT

## 2025-02-20 PROCEDURE — 80053 COMPREHEN METABOLIC PANEL: CPT

## 2025-02-20 RX ORDER — GABAPENTIN 600 MG/1
600 TABLET ORAL NIGHTLY
COMMUNITY
Start: 2025-01-27

## 2025-02-20 RX ORDER — DULAGLUTIDE 4.5 MG/.5ML
4.5 INJECTION, SOLUTION SUBCUTANEOUS WEEKLY
Qty: 12 ML | Refills: 3 | Status: SHIPPED | OUTPATIENT
Start: 2025-02-20 | End: 2025-05-21

## 2025-02-20 NOTE — TELEPHONE ENCOUNTER
Mom said Toyin does not want to live at the facility where she is right now. She said they are making her clean her room and follow the rules and she doesn't like that. Elissa was wondering if Dr Pickard can ask patient about her living situation and discuss this with her. Toyin did not want her Mom to come with today.

## 2025-02-20 NOTE — PROGRESS NOTES
HPI:   Toyin Villegas is a 29 year old female who presents for recheck of her diabetes. Last visit with ophthalmologist was May 2025.  Pt has been checking her feet on a regular basis. Pt denies any tingling of the feet. Pt has anxiety and depression; she feels very insecure about finances.  She feels as if her housing is not secure, as she is on a list of people who can potentially be asked to leave her facility for not adhering to cleanliness on apartment checks. She is gaining weight  on a GLP!1, although she tells me she is taking her medications regularly.  Pt complains of heart palpitations. She feels as if her heart STOPS, its not racing, but sometimes it will skip a beat. She will physically hit her chest, she reports \"mentally it helps\", but she is pretty sure it is not helping.  EKG done today.  She presents to the appointment alone, but her mother did call ahead to make me aware of the troubles she is facing at the facility.     Wt Readings from Last 6 Encounters:   02/20/25 291 lb (132 kg)   11/23/24 289 lb (131.1 kg)   11/02/24 280 lb (127 kg)   11/02/24 280 lb (127 kg)   09/18/24 285 lb (129.3 kg)   06/29/24 276 lb 8 oz (125.4 kg)     Body mass index is 52.38 kg/m².     Lab Results   Component Value Date    A1C 7.6 (H) 11/23/2024    A1C 8.0 (H) 09/18/2024    A1C 7.3 (H) 06/29/2024     Lab Results   Component Value Date    CHOLEST 144 11/23/2024    CHOLEST 185 06/29/2024    CHOLEST 182 08/30/2023     Lab Results   Component Value Date    HDL 30 (L) 11/23/2024    HDL 39 (L) 06/29/2024    HDL 38 (L) 08/30/2023     Lab Results   Component Value Date    LDL 73 11/23/2024     (H) 06/29/2024     (H) 08/30/2023     Lab Results   Component Value Date    TRIG 251 (H) 11/23/2024    TRIG 139 06/29/2024    TRIG 252 (H) 08/30/2023     Lab Results   Component Value Date    AST 23 11/23/2024    AST 27 11/03/2024    AST 25 11/02/2024     Lab Results   Component Value Date    ALT 31 11/23/2024    ALT 35  11/03/2024    ALT 47 11/02/2024     Malb/Cre Calc   Date Value Ref Range Status   03/25/2024 51.6 (H) <=30.0 ug/mg Final     Comment:     <30 ug/mg creatinine       Normal     ug/mg creatinine   Microalbuminuria   >300 ug/mg creatinine      Albuminuria       05/04/2022 207.9 (H) <=30.0 ug/mg Final     Comment:     <30 ug/mg creatinine       Normal     ug/mg creatinine   Microalbuminuria   >300 ug/mg creatinine      Albuminuria       12/28/2020 30.5 (H) <=30.0 ug/mg Final     Comment:     <30 ug/mg creatinine       Normal     ug/mg creatinine   Microalbuminuria   >300 ug/mg creatinine      Albuminuria           Current Outpatient Medications   Medication Sig Dispense Refill    gabapentin 600 MG Oral Tab Take 1 tablet (600 mg total) by mouth nightly.      Dulaglutide (TRULICITY) 4.5 MG/0.5ML Subcutaneous Solution Auto-injector Inject 4.5 mg into the skin once a week. 12 mL 3    glimepiride 4 MG Oral Tab Take 1 tablet (4 mg total) by mouth before breakfast. 90 tablet 1    gabapentin 300 MG Oral Cap Take 1 capsule (300 mg total) by mouth 3 (three) times daily.      ergocalciferol 1.25 MG (44573 UT) Oral Cap Take 1 capsule (50,000 Units total) by mouth once a week. 12 capsule 3    butalbital-acetaminophen-caffeine -40 MG Oral Tab Take 1 tablet by mouth every 4 (four) hours as needed for Pain.      ONETOUCH DELICA LANCETS 33G Does not apply Misc Test twice daily 1 Box 3      Past Medical History:    ADD (attention deficit disorder with hyperactivity)    Depression    Diabetes (HCC)    Fatty liver    Migraines    Obesity, unspecified    PCOS (polycystic ovarian syndrome)      History reviewed. No pertinent surgical history.   Social History:   Social History     Socioeconomic History    Marital status: Single   Tobacco Use    Smoking status: Never    Smokeless tobacco: Never   Vaping Use    Vaping status: Never Used   Substance and Sexual Activity    Alcohol use: Not Currently     Alcohol/week: 1.0  standard drink of alcohol     Types: 1 Glasses of wine per week     Comment: rare- sangria    Drug use: No   Other Topics Concern    Caffeine Concern Yes     Comment: soda     Social Drivers of Health     Food Insecurity: No Food Insecurity (11/3/2024)    Food Insecurity     Food Insecurity: Never true   Transportation Needs: No Transportation Needs (11/3/2024)    Transportation Needs     Lack of Transportation: No   Stress: Stress Concern Present (5/15/2024)    Received from [facility] and [facility]    [facility] [employer] of Occupational Health - Occupational Stress Questionnaire     Feeling of Stress : To some extent   Housing Stability: Low Risk  (11/3/2024)    Housing Stability     Housing Instability: No     Exercise: none.  Diet: doesn't watch     REVIEW OF SYSTEMS:   GENERAL HEALTH: feels well otherwise  SKIN: denies any unusual skin lesions or rashes  RESPIRATORY: denies shortness of breath with exertion  CARDIOVASCULAR: denies chest pain on exertion  GI: denies abdominal pain and denies heartburn  NEURO: denies headaches    EXAM:   /78   Pulse 93   Temp 98.5 °F (36.9 °C) (Temporal)   Resp 18   Wt 291 lb (132 kg)   LMP  (LMP Unknown)   SpO2 98%   BMI 52.38 kg/m²   GENERAL: well developed, well nourished,in no apparent distress  SKIN: no rashes,no suspicious lesions  NECK: supple,no adenopathy,no bruits  LUNGS: clear to auscultation  CARDIO: RRR without murmur  GI: good BS's,no masses, HSM or tenderness  EXTREMITIES: no cyanosis, clubbing or edema  Bilateral barefoot skin diabetic exam is normal, visualized feet and the appearance is normal.  Bilateral monofilament/sensation of both feet is normal.  Pulsation pedal pulse exam of both lower legs/feet is normal as well.  NEURO: sensation is intact to monofilament     ASSESSMENT AND PLAN:   Toyin Villegas is a 29 year old female who presents for a recheck of her diabetes. Diabetic control is unknown. She is struggling with  motivation right now due to anxiety and depression. She feels unsupported.  I have advised her that she needs to speak to a facility director to get instructions on what exactly they expect from her, meet the standard of cleanliness they lay out for her and keep her room in that condition. I have also asked her to consider counseling/ coaching to improve functionality as an independent adult. She has done this in the past and it has not resulted in permanent change, but it is time to try again. She has held a job now for years, she lives independent of her mother and has full responsibility over what she does in her free time.   Recommendations are: continue present meds, check HgbA1C, check fasting lipids and CMP, lose wgt with carbohydrate controlled diet and exercise, refer to Ophthamology, check feet daily. EKG was normal. Discussed asking for help with understanding expectation to stay at her facility; does not do well with confrontation.   The patient indicates understanding of these issues and agrees to the plan.  The patient is asked to return in 3 months.

## 2025-02-20 NOTE — TELEPHONE ENCOUNTER
PATIENT HAS APPOINTMENT TODAY @ 1:30 WITH DR BREWER, MOM WOULD LIKE TO TALK WITH NURSE PRIOR TO HER APPOINTMENT, CALL MOM BACK

## 2025-02-21 LAB
ATRIAL RATE: 89 BPM
P AXIS: 57 DEGREES
P-R INTERVAL: 156 MS
Q-T INTERVAL: 374 MS
QRS DURATION: 68 MS
QTC CALCULATION (BEZET): 455 MS
R AXIS: 60 DEGREES
T AXIS: 17 DEGREES
VENTRICULAR RATE: 89 BPM

## 2025-03-26 NOTE — TELEPHONE ENCOUNTER
Metformin ands progesterone for PCOS 2013, Fenofibrate for hypertriglyceridemia 2014, vit D since 2015 and Adderall 2015 from my records
Mom advised, also advised that the Aripiprazole and Escitalopram are for mood disorder.  JERRY.O. Dr Geoffrey Nails RN
Mom called back, she said she also needs to know the date all the scripts were first prescribed.
Mom said that the social security/ they need to know why she is taking Aripiprazole, Metformin, Escitalopram, Fenofibrate, and Progesterone. She needs diagnosis for all these. I informed her Vitamin D deficiency for the Vitamin D and ADHD for the Adderall.
Calm

## 2025-03-31 ENCOUNTER — OFFICE VISIT (OUTPATIENT)
Dept: FAMILY MEDICINE CLINIC | Facility: CLINIC | Age: 30
End: 2025-03-31
Payer: MEDICAID

## 2025-03-31 VITALS
DIASTOLIC BLOOD PRESSURE: 80 MMHG | OXYGEN SATURATION: 98 % | WEIGHT: 289 LBS | HEART RATE: 95 BPM | RESPIRATION RATE: 16 BRPM | TEMPERATURE: 98 F | SYSTOLIC BLOOD PRESSURE: 125 MMHG | HEIGHT: 62.5 IN | BODY MASS INDEX: 51.85 KG/M2

## 2025-03-31 DIAGNOSIS — R09.81 NASAL CONGESTION: ICD-10-CM

## 2025-03-31 DIAGNOSIS — H66.92 LEFT OTITIS MEDIA, UNSPECIFIED OTITIS MEDIA TYPE: Primary | ICD-10-CM

## 2025-03-31 PROBLEM — G25.81 RLS (RESTLESS LEGS SYNDROME): Status: ACTIVE | Noted: 2024-10-24

## 2025-03-31 PROCEDURE — 99213 OFFICE O/P EST LOW 20 MIN: CPT

## 2025-03-31 RX ORDER — AMOXICILLIN 500 MG/1
500 CAPSULE ORAL 3 TIMES DAILY
Qty: 30 CAPSULE | Refills: 0 | Status: SHIPPED | OUTPATIENT
Start: 2025-03-31 | End: 2025-04-10

## 2025-03-31 RX ORDER — FLUTICASONE PROPIONATE 50 MCG
1 SPRAY, SUSPENSION (ML) NASAL DAILY
Qty: 1 EACH | Refills: 0 | Status: SHIPPED | OUTPATIENT
Start: 2025-03-31 | End: 2025-04-30

## 2025-03-31 RX ORDER — BUTALBITAL, ACETAMINOPHEN AND CAFFEINE 50; 325; 40 MG/1; MG/1; MG/1
1 TABLET ORAL EVERY 4 HOURS PRN
Refills: 0 | Status: CANCELLED | OUTPATIENT
Start: 2025-03-31

## 2025-03-31 NOTE — PATIENT INSTRUCTIONS
Recommend use of cool mist humidifier, intranasal Flonase, allergy medication daily (Claritin or zyrtec) and sudafed/tylenol sinus if needed.

## 2025-03-31 NOTE — PROGRESS NOTES
Toyin Villegas is a 29 year old female.  HPI:     Patient in office for sinus pain, nasal congestion and sore throat that started 4 days ago. One episode of diarrhea 2 days ago. Denies fever.  She has taken tylenol for symptoms and staying hydrated with water and Pedialyte.  She has not tried allergy medications or over the counter decongestants.      Current Outpatient Medications   Medication Sig Dispense Refill    gabapentin 600 MG Oral Tab Take 1 tablet (600 mg total) by mouth nightly.      Dulaglutide (TRULICITY) 4.5 MG/0.5ML Subcutaneous Solution Auto-injector Inject 4.5 mg into the skin once a week. 12 mL 3    glimepiride 4 MG Oral Tab Take 1 tablet (4 mg total) by mouth before breakfast. 90 tablet 1    gabapentin 300 MG Oral Cap Take 1 capsule (300 mg total) by mouth 3 (three) times daily.      ergocalciferol 1.25 MG (16286 UT) Oral Cap Take 1 capsule (50,000 Units total) by mouth once a week. 12 capsule 3    butalbital-acetaminophen-caffeine -40 MG Oral Tab Take 1 tablet by mouth every 4 (four) hours as needed for Pain.      ONETOUCH DELICA LANCETS 33G Does not apply Misc Test twice daily 1 Box 3      Past Medical History:    ADD (attention deficit disorder with hyperactivity)    Depression    Diabetes (HCC)    Fatty liver    Migraines    Obesity, unspecified    PCOS (polycystic ovarian syndrome)      Social History:  Social History     Socioeconomic History    Marital status: Single   Tobacco Use    Smoking status: Never    Smokeless tobacco: Never   Vaping Use    Vaping status: Never Used   Substance and Sexual Activity    Alcohol use: Not Currently     Alcohol/week: 1.0 standard drink of alcohol     Types: 1 Glasses of wine per week     Comment: rare- sangria    Drug use: No   Other Topics Concern    Caffeine Concern Yes     Comment: soda     Social Drivers of Health     Food Insecurity: No Food Insecurity (11/3/2024)    Food Insecurity     Food Insecurity: Never true   Transportation Needs: No  Transportation Needs (11/3/2024)    Transportation Needs     Lack of Transportation: No   Stress: Stress Concern Present (5/15/2024)    Received from [facility]    [place] [employer] - Occupational Stress Questionnaire     Feeling of Stress : To some extent   Housing Stability: Low Risk  (11/3/2024)    Housing Stability     Housing Instability: No          REVIEW OF SYSTEMS:     Review of Systems   Constitutional:  Positive for fever.   HENT:  Positive for congestion, postnasal drip, sinus pressure, sinus pain and sore throat.    Respiratory: Negative.     Cardiovascular: Negative.    Gastrointestinal:  Positive for diarrhea.   Skin: Negative.    Neurological: Negative.        EXAM:   /80   Pulse 95   Temp 98.2 °F (36.8 °C) (Temporal)   Resp 16   Ht 5' 2.5\" (1.588 m)   Wt 289 lb (131.1 kg)   LMP  (LMP Unknown)   SpO2 98%   BMI 52.02 kg/m²     Physical Exam  Constitutional:       Appearance: Normal appearance. She is well-developed.   HENT:      Right Ear: Tympanic membrane is retracted.      Left Ear: Tympanic membrane is erythematous and bulging.      Nose: Congestion present.      Right Turbinates: Pale.      Left Turbinates: Pale.      Right Sinus: Frontal sinus tenderness present.      Left Sinus: Frontal sinus tenderness present.      Mouth/Throat:      Pharynx: Posterior oropharyngeal erythema present.   Eyes:      Pupils: Pupils are equal, round, and reactive to light.   Cardiovascular:      Rate and Rhythm: Normal rate and regular rhythm.      Pulses: Normal pulses.      Heart sounds: Normal heart sounds.   Pulmonary:      Effort: Pulmonary effort is normal.      Breath sounds: Normal breath sounds.   Skin:     General: Skin is warm and dry.   Neurological:      Mental Status: She is alert and oriented to person, place, and time.      Deep Tendon Reflexes: Reflexes are normal and symmetric.          ASSESSMENT AND PLAN:     1. Left otitis  media, unspecified otitis media type  Amoxicillin sent to pharmacy and instructions provided.  Recommended continued use of tylenol for pain.  - amoxicillin 500 MG Oral Cap; Take 1 capsule (500 mg total) by mouth 3 (three) times daily for 10 days.  Dispense: 30 capsule; Refill: 0    2. Nasal congestion  Flonase sent to pharmacy and instructions provided. Recommend use of cool mist humidifier, intranasal Flonase, allergy medication daily (Claritin or zyrtec) and sudafed/tylenol sinus if needed.    - fluticasone propionate (FLONASE ALLERGY RELIEF) 50 MCG/ACT Nasal Suspension; 1 spray by Each Nare route daily.  Dispense: 1 each; Refill: 0      The patient indicates understanding of these issues and agrees to the plan.  The patient is asked to return in 7 to 10 days if symptoms are not improving.    Fela Cordoba, APRN  3/31/2025       Airway patent, Nasal mucosa clear. MMM

## 2025-04-14 ENCOUNTER — NURSE ONLY (OUTPATIENT)
Dept: FAMILY MEDICINE CLINIC | Facility: CLINIC | Age: 30
End: 2025-04-14
Payer: MEDICAID

## 2025-04-14 DIAGNOSIS — Z30.9 ENCOUNTER FOR CONTRACEPTIVE MANAGEMENT, UNSPECIFIED TYPE: Primary | ICD-10-CM

## 2025-04-14 PROCEDURE — 96372 THER/PROPH/DIAG INJ SC/IM: CPT | Performed by: INTERNAL MEDICINE

## 2025-04-14 RX ORDER — MEDROXYPROGESTERONE ACETATE 150 MG/ML
150 INJECTION, SUSPENSION INTRAMUSCULAR ONCE
Status: COMPLETED | OUTPATIENT
Start: 2025-04-14 | End: 2025-04-14

## 2025-04-14 RX ADMIN — MEDROXYPROGESTERONE ACETATE 150 MG: 150 INJECTION, SUSPENSION INTRAMUSCULAR at 09:00:00

## 2025-04-14 NOTE — PROGRESS NOTES
Depo Provera given IM to left ventro gluteal within correct time frame. Patient advised to return between June 30th and July 14th for the next dose of Depo Provera. V.O. Dr Pickard/Alejandrina KOHLI

## 2025-04-16 ENCOUNTER — TELEPHONE (OUTPATIENT)
Dept: FAMILY MEDICINE CLINIC | Facility: CLINIC | Age: 30
End: 2025-04-16

## 2025-04-16 NOTE — TELEPHONE ENCOUNTER
Patient advised pharmacist states they are ordering the Trulicity and it will be in tomorrow. It will be covered by insurance.

## 2025-04-16 NOTE — TELEPHONE ENCOUNTER
Last OV w/Dr Pickard was 2/20/25  Last refill 2/20/25, dispense 12ML w 3 refills  LM for patient to CB 912am.

## 2025-04-16 NOTE — TELEPHONE ENCOUNTER
Please call regarding     Dulaglutide (TRULICITY) 4.5 MG/0.5ML Subcutaneous Solution Auto-injector     Thank you

## 2025-04-16 NOTE — TELEPHONE ENCOUNTER
Patient said she ran out of Trulicity last week and has been trying to get it filled but was told there was an insurance issue.

## 2025-04-23 RX ORDER — AMOXICILLIN 500 MG/1
500 CAPSULE ORAL 3 TIMES DAILY
Qty: 30 CAPSULE | Refills: 0 | Status: SHIPPED | OUTPATIENT
Start: 2025-04-23 | End: 2025-04-23 | Stop reason: CLARIF

## 2025-04-23 RX ORDER — METFORMIN HYDROCHLORIDE 500 MG/1
500 TABLET, EXTENDED RELEASE ORAL
Qty: 90 TABLET | Refills: 0 | Status: SHIPPED | OUTPATIENT
Start: 2025-04-23 | End: 2025-04-23

## 2025-04-23 RX ORDER — METFORMIN HYDROCHLORIDE 500 MG/1
500 TABLET, EXTENDED RELEASE ORAL
Qty: 90 TABLET | Refills: 3 | Status: SHIPPED | OUTPATIENT
Start: 2025-04-23

## 2025-04-23 NOTE — TELEPHONE ENCOUNTER
Last OV w/Dr Pickard was 2/20/25  Last Hgba1c was 2/20/25  Last refill on Metformin was 1/9/25 #90

## 2025-04-24 ENCOUNTER — TELEPHONE (OUTPATIENT)
Dept: FAMILY MEDICINE CLINIC | Facility: CLINIC | Age: 30
End: 2025-04-24

## 2025-04-29 ENCOUNTER — TELEPHONE (OUTPATIENT)
Dept: FAMILY MEDICINE CLINIC | Facility: CLINIC | Age: 30
End: 2025-04-29

## 2025-04-29 NOTE — TELEPHONE ENCOUNTER
Needing note to be able to have water on front end at [employer]  (her employer). Please call patient when ready so can  tomorrow during office hours

## 2025-04-29 NOTE — TELEPHONE ENCOUNTER
OK, if her blood sugar is high she will be thirsty.  Make sure you are taking medications daily and injectable weekly.  Recheck in 6 weeks with me

## 2025-05-01 RX ORDER — GLIMEPIRIDE 4 MG/1
4 TABLET ORAL
Qty: 90 TABLET | Refills: 1 | Status: SHIPPED | OUTPATIENT
Start: 2025-05-01

## 2025-05-01 NOTE — TELEPHONE ENCOUNTER
Last refill: 11/04/24  qtY: 90  W 1 refills  Last ov: 02/20/25    Requested Prescriptions     Pending Prescriptions Disp Refills    GLIMEPIRIDE 4 MG Oral Tab [Pharmacy Med Name: GLIMEPIRIDE 4MG TABLETS] 90 tablet 1     Sig: TAKE 1 TABLET(4 MG) BY MOUTH BEFORE BREAKFAST     Future Appointments   Date Time Provider Department Center   6/17/2025  1:30 PM Apolonia Pickard MD [facility] [place]

## 2025-06-16 ENCOUNTER — TELEPHONE (OUTPATIENT)
Dept: FAMILY MEDICINE CLINIC | Facility: CLINIC | Age: 30
End: 2025-06-16

## 2025-06-16 DIAGNOSIS — E11.9 DIABETES MELLITUS WITHOUT COMPLICATION (HCC): Primary | ICD-10-CM

## 2025-06-16 RX ORDER — MEDROXYPROGESTERONE ACETATE 150 MG/ML
150 INJECTION, SUSPENSION INTRAMUSCULAR
Qty: 1 EACH | Refills: 0 | Status: SHIPPED | OUTPATIENT
Start: 2025-06-16 | End: 2025-06-20

## 2025-06-16 NOTE — TELEPHONE ENCOUNTER
Patient advised she is not due for her Depo until June 30th-July 14th. She is asking if the medication can still be sent into [facility].     Verbal

## 2025-06-17 ENCOUNTER — OFFICE VISIT (OUTPATIENT)
Dept: FAMILY MEDICINE CLINIC | Facility: CLINIC | Age: 30
End: 2025-06-17
Payer: MEDICAID

## 2025-06-17 ENCOUNTER — LABORATORY ENCOUNTER (OUTPATIENT)
Dept: LAB | Age: 30
End: 2025-06-17
Attending: INTERNAL MEDICINE
Payer: MEDICAID

## 2025-06-17 VITALS
HEART RATE: 99 BPM | TEMPERATURE: 99 F | OXYGEN SATURATION: 98 % | RESPIRATION RATE: 18 BRPM | DIASTOLIC BLOOD PRESSURE: 72 MMHG | SYSTOLIC BLOOD PRESSURE: 126 MMHG | WEIGHT: 284 LBS | BODY MASS INDEX: 51 KG/M2

## 2025-06-17 DIAGNOSIS — G47.33 OSA ON CPAP: ICD-10-CM

## 2025-06-17 DIAGNOSIS — E55.9 VITAMIN D DEFICIENCY: ICD-10-CM

## 2025-06-17 DIAGNOSIS — E66.01 MORBID OBESITY (HCC): ICD-10-CM

## 2025-06-17 DIAGNOSIS — E11.9 DIABETES MELLITUS WITHOUT COMPLICATION (HCC): Primary | ICD-10-CM

## 2025-06-17 DIAGNOSIS — E11.9 DIABETES MELLITUS WITHOUT COMPLICATION (HCC): ICD-10-CM

## 2025-06-17 LAB
ALBUMIN SERPL-MCNC: 4.8 G/DL (ref 3.2–4.8)
ALBUMIN/GLOB SERPL: 1.7 {RATIO} (ref 1–2)
ALP LIVER SERPL-CCNC: 117 U/L (ref 37–98)
ALT SERPL-CCNC: 63 U/L (ref 10–49)
ANION GAP SERPL CALC-SCNC: 13 MMOL/L (ref 0–18)
AST SERPL-CCNC: 45 U/L (ref ?–34)
BILIRUB SERPL-MCNC: 0.4 MG/DL (ref 0.3–1.2)
BUN BLD-MCNC: 8 MG/DL (ref 9–23)
CALCIUM BLD-MCNC: 9.5 MG/DL (ref 8.7–10.6)
CHLORIDE SERPL-SCNC: 107 MMOL/L (ref 98–112)
CHOLEST SERPL-MCNC: 157 MG/DL (ref ?–200)
CO2 SERPL-SCNC: 21 MMOL/L (ref 21–32)
CREAT BLD-MCNC: 0.76 MG/DL (ref 0.55–1.02)
EGFRCR SERPLBLD CKD-EPI 2021: 109 ML/MIN/1.73M2 (ref 60–?)
EST. AVERAGE GLUCOSE BLD GHB EST-MCNC: 200 MG/DL (ref 68–126)
FASTING PATIENT LIPID ANSWER: NO
FASTING STATUS PATIENT QL REPORTED: NO
GLOBULIN PLAS-MCNC: 2.8 G/DL (ref 2–3.5)
GLUCOSE BLD-MCNC: 244 MG/DL (ref 70–99)
HBA1C MFR BLD: 8.6 % (ref ?–5.7)
HDLC SERPL-MCNC: 38 MG/DL (ref 40–59)
LDLC SERPL CALC-MCNC: 83 MG/DL (ref ?–100)
NONHDLC SERPL-MCNC: 119 MG/DL (ref ?–130)
OSMOLALITY SERPL CALC.SUM OF ELEC: 298 MOSM/KG (ref 275–295)
POTASSIUM SERPL-SCNC: 3.7 MMOL/L (ref 3.5–5.1)
PROT SERPL-MCNC: 7.6 G/DL (ref 5.7–8.2)
SODIUM SERPL-SCNC: 141 MMOL/L (ref 136–145)
TRIGL SERPL-MCNC: 211 MG/DL (ref 30–149)
VIT D+METAB SERPL-MCNC: 50.7 NG/ML (ref 30–100)
VLDLC SERPL CALC-MCNC: 34 MG/DL (ref 0–30)

## 2025-06-17 PROCEDURE — 80053 COMPREHEN METABOLIC PANEL: CPT

## 2025-06-17 PROCEDURE — 36415 COLL VENOUS BLD VENIPUNCTURE: CPT

## 2025-06-17 PROCEDURE — 82306 VITAMIN D 25 HYDROXY: CPT

## 2025-06-17 PROCEDURE — 80061 LIPID PANEL: CPT

## 2025-06-17 PROCEDURE — 99214 OFFICE O/P EST MOD 30 MIN: CPT | Performed by: INTERNAL MEDICINE

## 2025-06-17 PROCEDURE — 83036 HEMOGLOBIN GLYCOSYLATED A1C: CPT

## 2025-06-17 RX ORDER — DULAGLUTIDE 4.5 MG/.5ML
4.5 INJECTION, SOLUTION SUBCUTANEOUS WEEKLY
COMMUNITY
Start: 2025-06-09

## 2025-06-17 NOTE — PROGRESS NOTES
HPI:   Toyin Villegas is a 29 year old female who presents for recheck of her diabetes. Patient’s FBS have been 120-140. Last visit with ophthalmologist was last year.  Pt has been checking her feet on a regular basis. Pt denies any tingling of the feet. Pt denies any issues with depression. Pt has a therapist online, Karena.  Sometimes foods don't agree with her--intestinal pain and diarrhea. Taking Trulicity. She is not getting enough time at work.     Wt Readings from Last 6 Encounters:   06/17/25 284 lb (128.8 kg)   03/31/25 289 lb (131.1 kg)   02/20/25 291 lb (132 kg)   11/23/24 289 lb (131.1 kg)   11/02/24 280 lb (127 kg)   11/02/24 280 lb (127 kg)     Body mass index is 51.12 kg/m².     Lab Results   Component Value Date    A1C 8.5 (H) 02/20/2025    A1C 7.6 (H) 11/23/2024    A1C 8.0 (H) 09/18/2024     Lab Results   Component Value Date    CHOLEST 144 11/23/2024    CHOLEST 185 06/29/2024    CHOLEST 182 08/30/2023     Lab Results   Component Value Date    HDL 30 (L) 11/23/2024    HDL 39 (L) 06/29/2024    HDL 38 (L) 08/30/2023     Lab Results   Component Value Date    LDL 73 11/23/2024     (H) 06/29/2024     (H) 08/30/2023     Lab Results   Component Value Date    TRIG 251 (H) 11/23/2024    TRIG 139 06/29/2024    TRIG 252 (H) 08/30/2023     Lab Results   Component Value Date    AST 50 (H) 02/20/2025    AST 23 11/23/2024    AST 27 11/03/2024     Lab Results   Component Value Date    ALT 73 (H) 02/20/2025    ALT 31 11/23/2024    ALT 35 11/03/2024     Malb/Cre Calc   Date Value Ref Range Status   02/20/2025 51.3 (H) <=30.0 ug/mg Final     Comment:     <30 ug/mg creatinine       Normal     ug/mg creatinine   Microalbuminuria   >300 ug/mg creatinine      Albuminuria       03/25/2024 51.6 (H) <=30.0 ug/mg Final     Comment:     <30 ug/mg creatinine       Normal     ug/mg creatinine   Microalbuminuria   >300 ug/mg creatinine      Albuminuria       05/04/2022 207.9 (H) <=30.0 ug/mg Final      Comment:     <30 ug/mg creatinine       Normal     ug/mg creatinine   Microalbuminuria   >300 ug/mg creatinine      Albuminuria           Current Medications[1]   Past Medical History[2]   Past Surgical History[3]   Social History: Short Social Hx on File[4]  Exercise: none.  Diet: doesn't watch     REVIEW OF SYSTEMS:   GENERAL HEALTH: feels well otherwise  SKIN: denies any unusual skin lesions or rashes  RESPIRATORY: denies shortness of breath with exertion  CARDIOVASCULAR: denies chest pain on exertion  GI: denies abdominal pain and denies heartburn  NEURO: denies headaches    EXAM:   /72   Pulse 99   Temp 98.6 °F (37 °C) (Temporal)   Resp 18   Wt 284 lb (128.8 kg)   LMP  (LMP Unknown)   SpO2 98%   BMI 51.12 kg/m²   GENERAL: well developed, well nourished,in no apparent distress  SKIN: no rashes,no suspicious lesions  NECK: supple,no adenopathy,no bruits  LUNGS: clear to auscultation  CARDIO: RRR without murmur  GI: good BS's,no masses, HSM or tenderness  EXTREMITIES: no cyanosis, clubbing or edema  Bilateral barefoot skin diabetic exam is normal, visualized feet and the appearance is normal.  Bilateral monofilament/sensation of both feet is normal.  Pulsation pedal pulse exam of both lower legs/feet is normal as well.  NEURO: sensation is intact to monofilament     ASSESSMENT AND PLAN:   Toyin Villegas is a 29 year old female who presents for a recheck of her diabetes. Diabetic control is unknown.  Recommendations are: continue present meds, check HgbA1C, check fasting lipids and CMP, lose wgt with carbohydrate controlled diet and exercise, refer to Ophthamology, check feet daily.  The patient indicates understanding of these issues and agrees to the plan.  The patient is asked to return in 3 months.         [1]   Current Outpatient Medications   Medication Sig Dispense Refill    TRULICITY 4.5 MG/0.5ML Subcutaneous Solution Auto-injector Inject 4.5 mg into the skin once a week.       medroxyPROGESTERone (DEPO-PROVERA) 150 MG/ML Intramuscular Suspension Inject 1 mL (150 mg total) into the muscle every 3 (three) months for 4 days. 1 each 0    GLIMEPIRIDE 4 MG Oral Tab TAKE 1 TABLET(4 MG) BY MOUTH BEFORE BREAKFAST 90 tablet 1    metFORMIN  MG Oral Tablet 24 Hr Take 1 tablet (500 mg total) by mouth daily with breakfast. 90 tablet 3    gabapentin 600 MG Oral Tab Take 1 tablet (600 mg total) by mouth nightly.      ergocalciferol 1.25 MG (50502 UT) Oral Cap Take 1 capsule (50,000 Units total) by mouth once a week. 12 capsule 3    butalbital-acetaminophen-caffeine -40 MG Oral Tab Take 1 tablet by mouth every 4 (four) hours as needed for Pain.      ONETOUCH DELICA LANCETS 33G Does not apply Misc Test twice daily 1 Box 3   [2]   Past Medical History:   ADD (attention deficit disorder with hyperactivity)    Depression    Diabetes (HCC)    Fatty liver    Migraines    Obesity, unspecified    PCOS (polycystic ovarian syndrome)   [3] No past surgical history on file.  [4]   Social History  Socioeconomic History    Marital status: Single   Tobacco Use    Smoking status: Never    Smokeless tobacco: Never   Vaping Use    Vaping status: Never Used   Substance and Sexual Activity    Alcohol use: Not Currently     Alcohol/week: 1.0 standard drink of alcohol     Types: 1 Glasses of wine per week     Comment: rare- sangria    Drug use: No   Other Topics Concern    Caffeine Concern Yes     Comment: soda     Social Drivers of Health     Food Insecurity: No Food Insecurity (3/31/2025)    NCSS - Food Insecurity     Worried About Running Out of Food in the Last Year: No     Ran Out of Food in the Last Year: No   Transportation Needs: No Transportation Needs (3/31/2025)    NCSS - Transportation     Lack of Transportation: No   Stress: Stress Concern Present (5/15/2024)    Received from [facility]    [place] [employer]ational [employer] - Occupational Stress Questionnaire      Feeling of Stress : To some extent   Housing Stability: Not At Risk (3/31/2025)    NCSS - Housing/Utilities     Has Housing: Yes     Worried About Losing Housing: No     Unable to Get Utilities: No

## 2025-06-27 ENCOUNTER — NURSE TRIAGE (OUTPATIENT)
Dept: FAMILY MEDICINE CLINIC | Facility: CLINIC | Age: 30
End: 2025-06-27

## 2025-06-27 NOTE — TELEPHONE ENCOUNTER
Sinus infection symptoms, post nasal drip, offered to book appt but would prefer to talk to nurse about symptoms

## 2025-06-27 NOTE — TELEPHONE ENCOUNTER
Per patient started 3 days ago with sinus congestion, sore throat, and dry cough.  Sore throat has resolved. No pain. Afebrile at time of call. No shortness of breath.  Has been using fluticasone which she feels is helping.   Appointment offered but patient declined, agrees to call back or go to walk in clinic/urgent care with persistent or worsening symptoms. Will continue to treat symptoms with over the counter medications.  Reason for Disposition   Sinus congestion as part of a cold, present < 10 days    Protocols used: Sinus Pain and Congestion-A-OH  CALL BACK IF: * Severe pain persists over 2 hours after pain medicine * Sinus pain persists over 1 day after using nasal washes * Sinus congestion (fullness) persists over 10 days * Fever lasts over 3 days * You become worse     Routed as NAGI

## 2025-06-28 ENCOUNTER — OFFICE VISIT (OUTPATIENT)
Dept: FAMILY MEDICINE CLINIC | Facility: CLINIC | Age: 30
End: 2025-06-28
Payer: MEDICAID

## 2025-06-28 VITALS
WEIGHT: 284 LBS | TEMPERATURE: 98 F | BODY MASS INDEX: 51 KG/M2 | RESPIRATION RATE: 20 BRPM | DIASTOLIC BLOOD PRESSURE: 72 MMHG | HEART RATE: 97 BPM | OXYGEN SATURATION: 97 % | SYSTOLIC BLOOD PRESSURE: 131 MMHG

## 2025-06-28 DIAGNOSIS — R05.9 COUGH, UNSPECIFIED TYPE: ICD-10-CM

## 2025-06-28 DIAGNOSIS — J06.9 UPPER RESPIRATORY TRACT INFECTION, UNSPECIFIED TYPE: Primary | ICD-10-CM

## 2025-06-28 LAB
OPERATOR ID: NORMAL
RAPID SARS-COV-2 BY PCR: NOT DETECTED

## 2025-06-28 PROCEDURE — 99213 OFFICE O/P EST LOW 20 MIN: CPT | Performed by: PHYSICIAN ASSISTANT

## 2025-06-28 PROCEDURE — U0002 COVID-19 LAB TEST NON-CDC: HCPCS | Performed by: PHYSICIAN ASSISTANT

## 2025-06-28 RX ORDER — BENZONATATE 200 MG/1
200 CAPSULE ORAL 3 TIMES DAILY PRN
Qty: 30 CAPSULE | Refills: 0 | Status: SHIPPED | OUTPATIENT
Start: 2025-06-28

## 2025-06-28 NOTE — PROGRESS NOTES
CHIEF COMPLAINT:     Chief Complaint   Patient presents with    Cough     Started wed          HPI:   Toyin Villegas is a 29 year old female who presents with cough for 3 days.  Began as sore throat and post nasal drip.  No sob, cp or fever.     Associated symptoms:    Fever/Chills  No  Sore throat  Yes but improves  Cough  No   Congestion Yes  Bodyache  Yes mild  Headache  Yes  Chest pain No  SOB/Dyspnea No  Loss of taste No  Loss of smell No  Diarrhea No  Vomiting No    No flu shot this season    Covid vaccinations:  primary series.     No chronic sinus or respiratory issues.     She is diabetic.  Sugars running ok.    Current Medications[1]   Past Medical History[2]   Social History:  Short Social Hx on File[3]     Review of Systems:    Positive for stated complaint: uri symptoms.   Pertinent positives and negatives noted in the the HPI.    EXAM:   /72   Pulse 97   Temp 97.9 °F (36.6 °C)   Resp 20   Wt 284 lb (128.8 kg)   LMP  (LMP Unknown)   SpO2 97%   BMI 51.12 kg/m²   GENERAL: well developed, well nourished,in no apparent distress  SKIN: no rashes,no suspicious lesions  HEAD: atraumatic, normocephalic  EYES: conjunctiva clear, sclera white,  PERRLA  EARS: TM's non erythematous  NOSE: nares patent, mucosa mild congestion  THROAT: Posterior pharynx is minimally erythematous, tonsils 2 + without exudate.   NECK: supple, non-tender  LUNGS: clear to auscultation bilaterally without rale, ronchi, wheeze.  CARDIO: S1/S2 without murmur  EXTREMITIES: no cyanosis, clubbing or edema  LYMPH:  no gross lymphadenopathy.      Recent Results (from the past 24 hours)   COVID-19 LAB TEST NON-CDC    Collection Time: 06/28/25 10:46 AM    Specimen: Nares   Result Value Ref Range    Rapid SARS-CoV-2 by PCR Not Detected Not Detected    POCT Lot Number [id number]     POCT Expiration Date 09/27/2026     POCT Procedure Control Control Valid Control Valid     ,603          ASSESSMENT AND PLAN:   Toyin GLASER  Nelly is a 29 year old female who presents with Cough (Started wed /). Symptoms are consistent with:      ASSESSMENT:  Encounter Diagnoses   Name Primary?    Cough, unspecified type     Upper respiratory tract infection, unspecified type Yes     Discussed the likely viral nature of her symptoms.       PLAN: Covid Test Rapid ID Now is negative.       Symptomatic care:   1. Rest. Drink plenty of fluids.  2. Tylenol or ibuprofen for discomfort or fever.   3. OTC decongestant (phenylephrine) expectorants (guaifenesin), nasal steroid sprays  (fluticasone) may be helpful        for congestion.  4. OTC cough suppressant for cough  (dextromethorphan)  5. Chloraseptic spray/throat lozenges for sore throat   6 tessalon as written.     Go to the ED for evaluation with progressive symptoms of difficulty swallowing, breathing, shortness of breath, chest pain, extreme weakness, or confusion.         Meds & Refills for this Visit:  Requested Prescriptions     Signed Prescriptions Disp Refills    benzonatate 200 MG Oral Cap 30 capsule 0     Sig: Take 1 capsule (200 mg total) by mouth 3 (three) times daily as needed.       Risks, benefits, side effects of medication addressed and explained.    There are no Patient Instructions on file for this visit.    The patient indicates understanding of these issues and agrees to the plan.  The patient is asked to follow up PCP           [1]   Current Outpatient Medications   Medication Sig Dispense Refill    benzonatate 200 MG Oral Cap Take 1 capsule (200 mg total) by mouth 3 (three) times daily as needed. 30 capsule 0    glimepiride 4 MG Oral Tab Take 2 tablets (8 mg total) by mouth before breakfast. 180 tablet 1    TRULICITY 4.5 MG/0.5ML Subcutaneous Solution Auto-injector Inject 4.5 mg into the skin once a week.      metFORMIN  MG Oral Tablet 24 Hr Take 1 tablet (500 mg total) by mouth daily with breakfast. 90 tablet 3    gabapentin 600 MG Oral Tab Take 1 tablet (600 mg total) by mouth  nightly.      ergocalciferol 1.25 MG (90057 UT) Oral Cap Take 1 capsule (50,000 Units total) by mouth once a week. 12 capsule 3    butalbital-acetaminophen-caffeine -40 MG Oral Tab Take 1 tablet by mouth every 4 (four) hours as needed for Pain.      ONETOUCH DELICA LANCETS 33G Does not apply Misc Test twice daily 1 Box 3   [2]   Past Medical History:   ADD (attention deficit disorder with hyperactivity)    Depression    Diabetes (HCC)    Fatty liver    Migraines    Obesity, unspecified    PCOS (polycystic ovarian syndrome)   [3]   Social History  Socioeconomic History    Marital status: Single   Tobacco Use    Smoking status: Never    Smokeless tobacco: Never   Vaping Use    Vaping status: Never Used   Substance and Sexual Activity    Alcohol use: Not Currently     Alcohol/week: 1.0 standard drink of alcohol     Types: 1 Glasses of wine per week     Comment: rare- sangria    Drug use: No   Other Topics Concern    Caffeine Concern Yes     Comment: soda     Social Drivers of Health     Food Insecurity: No Food Insecurity (3/31/2025)    NCSS - Food Insecurity     Worried About Running Out of Food in the Last Year: No     Ran Out of Food in the Last Year: No   Transportation Needs: No Transportation Needs (3/31/2025)    NCSS - Transportation     Lack of Transportation: No   Stress: Stress Concern Present (5/15/2024)    Received from [facility]    [place] of Occupational Health - Occupational Stress Questionnaire     Feeling of Stress : To some extent   Housing Stability: Not At Risk (3/31/2025)    NCSS - Housing/Utilities     Has Housing: Yes     Worried About Losing Housing: No     Unable to Get Utilities: No

## 2025-07-07 ENCOUNTER — TELEPHONE (OUTPATIENT)
Dept: FAMILY MEDICINE CLINIC | Facility: CLINIC | Age: 30
End: 2025-07-07

## 2025-07-07 NOTE — TELEPHONE ENCOUNTER
Refill was sent to [facility] on 6/16/25. If she didn't pick it up yet, she will need to call them and have them get it ready for her again.  Per 6/16/25 phone note, pt is due for Depo between 6/30--7/14.    L/m to c/b to schedule nurse appt

## 2025-07-07 NOTE — TELEPHONE ENCOUNTER
Need nurse appointment for depo shot & will also need depo sent to [facility], call patient back

## 2025-07-12 ENCOUNTER — NURSE ONLY (OUTPATIENT)
Dept: FAMILY MEDICINE CLINIC | Facility: CLINIC | Age: 30
End: 2025-07-12
Payer: MEDICAID

## 2025-07-12 PROCEDURE — 96372 THER/PROPH/DIAG INJ SC/IM: CPT | Performed by: INTERNAL MEDICINE

## 2025-07-12 RX ADMIN — MEDROXYPROGESTERONE ACETATE 150 MG: 150 INJECTION, SUSPENSION INTRAMUSCULAR at 09:35:00

## 2025-07-12 NOTE — PROGRESS NOTES
Toyni is here for depo shot - patient supplied. IM given RIGHT ventrogluteal.    Next one will be due between 09/27/25 and 10/11/25.     Patient informed.

## 2025-08-18 ENCOUNTER — TELEPHONE (OUTPATIENT)
Dept: FAMILY MEDICINE CLINIC | Facility: CLINIC | Age: 30
End: 2025-08-18

## 2025-08-19 ENCOUNTER — OFFICE VISIT (OUTPATIENT)
Dept: FAMILY MEDICINE CLINIC | Facility: CLINIC | Age: 30
End: 2025-08-19

## 2025-08-19 VITALS
BODY MASS INDEX: 52 KG/M2 | TEMPERATURE: 97 F | DIASTOLIC BLOOD PRESSURE: 76 MMHG | WEIGHT: 288 LBS | RESPIRATION RATE: 18 BRPM | OXYGEN SATURATION: 100 % | HEART RATE: 86 BPM | SYSTOLIC BLOOD PRESSURE: 128 MMHG

## 2025-08-19 DIAGNOSIS — M25.512 ACUTE PAIN OF BOTH SHOULDERS: Primary | ICD-10-CM

## 2025-08-19 DIAGNOSIS — M25.511 ACUTE PAIN OF BOTH SHOULDERS: Primary | ICD-10-CM

## 2025-08-19 PROCEDURE — 99214 OFFICE O/P EST MOD 30 MIN: CPT | Performed by: INTERNAL MEDICINE

## 2025-08-28 RX ORDER — ERGOCALCIFEROL 1.25 MG/1
50000 CAPSULE, LIQUID FILLED ORAL WEEKLY
Qty: 12 CAPSULE | Refills: 3 | Status: SHIPPED | OUTPATIENT
Start: 2025-08-28

## (undated) DIAGNOSIS — E11.9 DIABETES MELLITUS WITHOUT COMPLICATION (HCC): Primary | ICD-10-CM

## (undated) NOTE — LETTER
Date: 11/12/2019    Patient Name: Yordan Nicholas          To Whom it may concern: This letter has been written at the patient's request. The above patient was seen at the [facility] for treatment of a medical condition.     This patient s

## (undated) NOTE — MR AVS SNAPSHOT
[facility]  [address] [place] [id number]  [phone]               Thank you for choosing us for your health care visit with Miguelina Mane MD.  We are glad to serve you and happy to provide you with this summary o TAKE 1 CAPSULE BY MOUTH EVERY DAY WITH BREAKFAST. Commonly known as:  LOFIBRA           Lisdexamfetamine Dimesylate 20 MG Caps   Take 1 capsule (20 mg total) by mouth every morning.    Commonly known as:  VYVANSE           MetFORMIN HCl  MG Tb24   T Visit [facility] online at  [email or web address]

## (undated) NOTE — LETTER
Date: 5/15/2023    Patient Name: Ashley Keith          To Whom it may concern: This letter has been written at the patient's request. The above patient was seen at the [facility] for treatment of a medical condition. The patient may return to work  on 5/16/23.         Sincerely,    Adrian SPEAR

## (undated) NOTE — LETTER
Date: 11/4/2024    Patient Name: Toyin Villegas          To Whom it may concern:    This letter has been written at the patient's request. The above patient was seen at [facility] for treatment of a medical condition.    This patient should be excused from attending work/school from 11/3/2024 through 11/4/24.        Sincerely,    KAMILLA Skaggs

## (undated) NOTE — LETTER
Date: 6/2/2022    Patient Name: Marni Rodriguez          To Whom it may concern: The above patient was seen at the [facility] for treatment of a medical condition. This patient should be excused from attending work. The patient may return to work 6/3/2022 without restriction.       Sincerely,    Des Elliott MD

## (undated) NOTE — LETTER
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     Thank you,  Dr Apolonia Pickard MD

## (undated) NOTE — MR AVS SNAPSHOT
[facility]  [address] [facility] [address]  [phone]               Thank you for choosing us for your health care visit with Alisa Conroy MD.  We are glad to serve you and happy to provide you with this summary o MetFORMIN HCl  MG Tb24   TAKE 2 TABLETS BY MOUTH EVERY DAY WITH DINNER   Commonly known as:  GLUCOPHAGE-XR           MULTIVITAL OR   Take 1 tablet by mouth daily.            predniSONE 20 MG Tabs   3 po q day x 3 days, 2 po q day x 3 days, 1 po q Make half your plate fruits and vegetables Highly refined, white starches including white bread, rice and pasta   Eat plenty of protein, keep the fat content low Sugars:  sodas and sports drinks, candies and desserts   Eat plenty of low-fat dairy products

## (undated) NOTE — LETTER
Consent for Administration of [address]   Occasionally, pharmaceuticals required/requested to be administered by [facility] staff in office/clinic settings cannot be purchased/made available by [facility] (typically parenteral). Additionally, even when medications are available, patient out-of-pocket expense may be higher unless the medication is acquired through insurance/medical coverage-approved agreements/plans. This written consent form is used when patient and/or insurer communications result in a decision to allow the alternative option of patient-supplied pharmaceutical use by [facility] staff medication administration in the office/clinic setting after an assessed absence of associated risk and in the presence of obvious patient expense and convenience benefits. If, and when, all the following safety measures and guidelines are employed by the patient and/or his/her guardian/HCPOA/Agent, use is allowed based on consent until or unless it is rescinded after signed. Patient Name: Grecia Arias  Patient YOB: 1995  Patient Medical Record Identifier: [id number]  Patient-Supplied Pharmaceutical(s)Medroxyprogesterone 150mg IM   administering provider office oversight:  ______________________________________________________________________  ______________________________________________________________________  ____________________________________________________________________________________________________________________________________________     I, (patient and/or designee and relationship) ________________________________, attest, based on my personal knowledge and handling, that the above noted pharmaceutical(s) provided for [facility] administration, were maintained and/or managed in transport according to [facility] and/or specific prescriber instructions. This includes, but is not limited to, temperature and light requirements/precautions.   I attest that, while in my possession, the pharmaceutical has not been tampered with or damaged and that any assessed damage to the original seal will result in the medication being ineligible for administration by [facility] staff, requiring replacement by patient for reconsideration. I am aware of the risks, benefits and alternatives of receiving the medication, have had the opportunity to ask clarifying questions and agree to indemnify and hold harmless [facility] and its affiliates, officers, board members, employees and agents from any and all claims and liability for injuries or damages arising directly or indirectly as a result of administration of self-supplied pharmaceuticals including, but not limited to, unanticipated symptoms, allergic reaction(s), side effect(s) and/or known complications arising from use/administration.       Patient Printed Name:  Melissa Emmanuel  Patient Signature: ___________________________________________________  [facility] staff Witness:  Juliette Concepcino RN  Date/Time:  5/4/2022/3:48 PM

## (undated) NOTE — LETTER
Date: 3/31/2025    Patient Name: Toyin Villegas          To Whom it may concern:    The above patient was seen at [facility] for treatment of a medical condition.    This patient should be excused from attending work from 3/29 through 4/1.    The patient may return to work on 4/2.        Sincerely,    BEATRIS Marie

## (undated) NOTE — LETTER
[facility] [employer] of Child Health Examination       Student's Name  [employer] Birth Ananda Title                           Date     Signature HEALTH HISTORY          TO BE COMPLETED AND SIGNED BY PARENT/GUARDIAN AND VERIFIED BY HEALTH CARE PROVIDER    ALLERGIES  (Food, drug, insect, other) MEDICATION  (List all prescribed or taken on a regular basis.)     Diagnosis of asthma?   Child wakes during DIABETES SCREENING  BMI>85% age/sex  yes And any two of the following:  Family History No   Ethnic Minority  No          Signs of Insulin Resistance (hypertension, dyslipidemia, polycystic ovarian syndrome, acanthosis nigricans)    Yes           At Risk  Y Quick-relief  medication (e.g. Short Acting Beta Antagonist): No          Controller medication (e.g. inhaled corticosteroid):   No Other   NEEDS/MODIFICATIONS required in the school setting  None DIETARY Needs/Restrictions     None   SPECIAL INSTR

## (undated) NOTE — LETTER
[facility] [address]  [phone]          Date: 9/25/2020      Patient Name: Blaine Ernst            To Whom it may concern:     The above patient was seen at the

## (undated) NOTE — Clinical Note
Date: 6/15/2017    Patient Name: Martene Bence          To Whom it may concern: The above patient was seen at the [facility] for treatment of a medical condition.     This patient should be excused from attending work Tues 6/13/17 due to i

## (undated) NOTE — LETTER
Date: 5/9/2023    Patient Name: Tank Alcantar          To Whom it may concern: The above patient was seen at the [facility] for treatment of a medical condition. This patient should be excused from attending work from 5/9 through 5/13. The patient may return to work on 5/14.         Sincerely,    BEATRIS Santiago

## (undated) NOTE — LETTER
Date & Time: 6/18/2021, 7:27 PM  Patient: Missy Otto  Encounter Provider(s):    BEATRIS Herndon       To Whom It May Concern:    Char Larose was seen and treated in our department on 6/18/2021.  She should not return to work until 6/20/21

## (undated) NOTE — Clinical Note
Date: 6/19/2017    Patient Name: Aubrey Peralta          To Whom it may concern: The above patient was seen at the [facility] for treatment of a medical condition.     This patient should be excused from attending work 6/17 and 6/19/2017 du
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
## (undated) NOTE — LETTER
Date: 3/9/2021    Patient Name: Blaine Ernst          To Whom it may concern: The above patient was seen at the [facility] for treatment of a medical condition.     This patient should be excused from attending work due to side effects

## (undated) NOTE — LETTER
Date: 5/9/2023    Patient Name: Jl Mendoza          To Whom it may concern: The above patient was seen at the [facility] for treatment of a medical condition. This patient should be excused from attending work from 5/9 to 5/13    The patient may return to school on 5/14.         Sincerely,    BEATRIS Steiner